# Patient Record
Sex: MALE | Race: BLACK OR AFRICAN AMERICAN | Employment: OTHER | URBAN - METROPOLITAN AREA
[De-identification: names, ages, dates, MRNs, and addresses within clinical notes are randomized per-mention and may not be internally consistent; named-entity substitution may affect disease eponyms.]

---

## 2017-01-10 ENCOUNTER — TELEPHONE (OUTPATIENT)
Dept: ENDOCRINOLOGY | Age: 57
End: 2017-01-10

## 2017-01-10 NOTE — TELEPHONE ENCOUNTER
Has upcoming appt 1/16/17 to discuss possible change to U500.  ----- Message from Perley Klinefelter sent at 1/9/2017  4:21 PM EST -----  Regarding: FW: Leeroy Vee / Brianna Gautam,  Please see attached message from [facility]. \"  Thanks Bharat Freeman.    ----- Message -----     From: Ondina Feng     Sent: 1/9/2017   3:45 PM       To: [address] [address] Office Pool  Subject: Leeroy Vee / Akbar Aguilar                                 [phone]    Pt's daughter Adriane Barry is requesting that a prescription for the \"novolog insulin\" 70-30 be sent to the [facility] on file.

## 2017-02-02 RX ORDER — SYRINGE AND NEEDLE,INSULIN,1ML 31GX15/64"
1 SYRINGE, EMPTY DISPOSABLE MISCELLANEOUS 2 TIMES DAILY
Qty: 200 PEN NEEDLE | Refills: 3 | Status: SHIPPED | OUTPATIENT
Start: 2017-02-02

## 2017-02-02 RX ORDER — SYRINGE AND NEEDLE,INSULIN,1ML 31GX15/64"
1 SYRINGE, EMPTY DISPOSABLE MISCELLANEOUS 2 TIMES DAILY WITH MEALS
Qty: 200 PEN NEEDLE | Refills: 3 | Status: SHIPPED | OUTPATIENT
Start: 2017-02-02 | End: 2017-02-02 | Stop reason: SDUPTHER

## 2017-02-02 NOTE — TELEPHONE ENCOUNTER
Patient is calling to request a refill on insulin syringes 6mm gauge 31. He uses [facility] DR JOANNA GÓMEZ in [address] Pharmacy phone: [phone]. Thank you.        Ramsey Mercedes

## 2017-02-20 RX ORDER — SYRINGE AND NEEDLE,INSULIN,1ML 30 G X1/2"
SYRINGE, EMPTY DISPOSABLE MISCELLANEOUS
Qty: 100 SYRINGE | Refills: 11 | Status: SHIPPED | OUTPATIENT
Start: 2017-02-20

## 2017-02-20 NOTE — TELEPHONE ENCOUNTER
----- Message from Sandi Meter sent at 2/20/2017  3:23 PM EST -----  Regarding: phone call  Patient's wife, Arcelia Christian, called to ask that you phone in a \"longer needle\" to her 's pharmacy. The needle he is using to inject his insulin is too short. Arcelia Christian is on his disclosure form and can be reached at:  [phone]. Thanks [employer].     Nehal   [phone]  Longer Insulin Needles

## 2017-03-27 ENCOUNTER — OFFICE VISIT (OUTPATIENT)
Dept: ENDOCRINOLOGY | Age: 57
End: 2017-03-27

## 2017-03-27 VITALS
DIASTOLIC BLOOD PRESSURE: 80 MMHG | HEART RATE: 78 BPM | HEIGHT: 74 IN | WEIGHT: 315 LBS | BODY MASS INDEX: 40.43 KG/M2 | SYSTOLIC BLOOD PRESSURE: 160 MMHG

## 2017-03-27 DIAGNOSIS — E11.42 TYPE 2 DIABETES MELLITUS WITH DIABETIC POLYNEUROPATHY, WITH LONG-TERM CURRENT USE OF INSULIN (HCC): Primary | ICD-10-CM

## 2017-03-27 DIAGNOSIS — E78.5 HYPERLIPIDEMIA WITH TARGET LDL LESS THAN 100: ICD-10-CM

## 2017-03-27 DIAGNOSIS — I10 ESSENTIAL HYPERTENSION: ICD-10-CM

## 2017-03-27 DIAGNOSIS — I65.23 BILATERAL CAROTID ARTERY STENOSIS: ICD-10-CM

## 2017-03-27 DIAGNOSIS — Z79.4 TYPE 2 DIABETES MELLITUS WITH DIABETIC POLYNEUROPATHY, WITH LONG-TERM CURRENT USE OF INSULIN (HCC): Primary | ICD-10-CM

## 2017-03-27 LAB — HBA1C MFR BLD HPLC: 8.6 %

## 2017-03-27 RX ORDER — CLOPIDOGREL BISULFATE 75 MG/1
75 TABLET ORAL DAILY
Qty: 90 TAB | Refills: 0 | Status: SHIPPED | OUTPATIENT
Start: 2017-03-27 | End: 2021-05-30

## 2017-03-27 RX ORDER — LOSARTAN POTASSIUM 100 MG/1
TABLET ORAL
Qty: 90 TAB | Refills: 3 | Status: ON HOLD | OUTPATIENT
Start: 2017-03-27 | End: 2021-05-29 | Stop reason: SDUPTHER

## 2017-03-27 NOTE — PROGRESS NOTES
Chief Complaint   Patient presents with    Diabetes     pcp and pharmacy confirmed   Records since last visit reviewed   History of Present Illness: Angie Harris is a 61 y.o. male here for follow up of diabetes. He was diagnosed with diabetes in the 90's.    At his last visit in November 2016 his A1C was 9.7% on Novolog 70/30: 85 units with breakfast and 85 units with dinner plus 2:50 for BG >150 and Metformin 1000mg BID. We discussed changing his insulin from the 70:30 to U-500 as he is requiring very high doses of insulin and his A1C was still above goal.  He wanted to hold off on making the change so we increased his dose to Novolog 100 units with breakfast and with dinner. Pt brought his BG logs with him today. His FBGs have been in the 150-250's range. He is pre-dinner BGs have been in the 150-250's range. He notes that \"I cheat on the weekends\". Pt is taking the Metformin 1000mg BID and Novolog 70:30  units with breakfast and with diner. There have been a couple of days he missed as shot because his wife did not fill his syringe. He notes that when his granddaughter is eating sweets he wants one as well so he is eating sweets on occasions. His wife notes that she has been much busier at work (EOL at school) and they have been eating more fast foods. The fast foods seem to make his BGs higher. His A1C was down to 8.6%. Pt notes he is still in PT and has been taught a new way to walk. He is going once per week, which is the most he can go because his wife, who is his transport, can not do it more often. Pt is eating 3 meals per day. He has breakfast around 7AM, this AM he had a bowl of oatmeal, a banana and coffee (stevia). He denies snacking between breakfast and lunch  He has lunch around 1230-130PM, yesterday he had a fish sandwich and some BBQ and and water  He will have a snack between lunch and dinner, of popcorn.  Typically around 5PM.   He has dinner around 7-8PM, last night he had chicken wings (steamed). He will have desert every night, he will have a [place] ice cream bar in the evening. He notes that when his granddaughter is around he is more likely to snack on chips. He notes he is still struggling with his sweet tooth. Exercise consists of very little. Pt has limited mobility from the CVA and his Charcot. Pt has a hx of CVA, Sz as well had Charcot foot in his left foot and hx of foot ulcers, pt is being followed by a podiatrist Dr. Streeter Said for his foot issues. He saw her last week. She told him that his foot ulcers are looking better. The ulcers are healing. They are talking about reconstruction of his foot in the near future, but wants to get his A1C in the 8 or less range. She also trimmed his toenails. No history of retinopathy, but does have neuropathy and nephropathy (Urine MA/Cr positive in August 2015). Last eye exam was December 2015 no retinopathy. A significant portion of the history was provided by his wife who accompanied him to his visit today. Current Outpatient Prescriptions   Medication Sig    losartan (COZAAR) 100 mg tablet TAKE ONE TABLET BY MOUTH ONCE DAILY    clopidogrel (PLAVIX) 75 mg tab Take 1 Tab by mouth daily.  BD INSULIN SYRINGE 1 mL 28 gauge x 1/2\" syrg Use two times per day with insulin  DX: E11.42 (patient wanted longer needle)    insulin syringe-needle U-100 1 mL 31 gauge x 15/64\" syrg 1 Syringe by Does Not Apply route two (2) times a day. Use to inject insulin two times per day. DX: E11.42    amLODIPine (NORVASC) 10 mg tablet Take 1 Tab by mouth daily.  insulin regular hum U-500 conc 500 unit/mL (3 mL) inpn 65 units TID    glucose blood VI test strips (ACCU-CHEK SMARTVIEW TEST STRIP) strip Test 2-3 times daily    insulin NPH/insulin regular (NOVOLIN 70/30, HUMULIN 70/30) 100 unit/mL (70-30) injection Inject  units in AM and PM with meals.  Dispense novolin or humulin brand or relion brand whichever is cheaper  atorvastatin (LIPITOR) 10 mg tablet Take 1 Tab by mouth nightly.  metFORMIN (GLUCOPHAGE) 1,000 mg tablet Take 1 Tab by mouth two (2) times daily (with meals). Indications: TYPE 2 DIABETES MELLITUS    aspirin delayed-release 81 mg tablet Take 1 Tab by mouth daily.  Multivit,Ca,Iron-FA-Lyco-Lut (CENTRAL-DUY) -913-250 mg-mcg-mcg-mcg tab Take  by mouth.  Cholecalciferol, Vitamin D3, (VITAMIN D3) 1,000 unit cap Take  by mouth.  Hydrochlorothiazide 12.5 mg tablet Take 12.5 mg by mouth daily.  metoprolol (LOPRESSOR) 50 mg tablet Take 50 mg by mouth two (2) times a day. No current facility-administered medications for this visit. Allergies   Allergen Reactions    Lisinopril Cough     Review of Systems:  - Eyes: no blurry vision or double vision  - Cardiovascular: no chest pain  - Respiratory: no shortness of breath  - Musculoskeletal: no myalgias  - Neurological: + numbness/tingling in feet    Physical Examination:  Blood pressure 160/80, pulse 78, height 6' 2\" (1.88 m), weight (!) 359 lb 4.8 oz (163 kg). - General: pleasant, no distress, good eye contact   - Neck: no carotid bruits  - Cardiovascular: regular, normal rate, nl s1 and s2, no m/r/g, 2+ DP pulses   - Respiratory: clear bilaterally        -     Foot exam deferred since he just saw his podiatrist last week. Images from podiatrist reviewed. - Psychiatric: normal mood and affect    Data Reviewed:   A1C today was 8.6%    Assessment/Plan:   1) DM > Pt's A1C has decreased to 8.6%. Pt instructed to take the Novolog 70:30 100 units BID plus correction. 2) HTN > BP was above goal today, pt is on an ARB    3) HLD > His lipid panel in June 2016 was excellent. Pt is tolerating his Atorvastatin well. Pt voices understanding and agreement with the plan. Pain noted and pt was recommended to call his PCP for further evaluation and treatment, as needed    Follow-up Disposition:  Return in about 3 months (around 6/27/2017).     Copy sent to:  Dr. Krystian Ca

## 2017-03-27 NOTE — LETTER
NOTIFICATION RETURN TO WORK / SCHOOL 
 
3/27/2017 12:31 PM 
 
Mr. Shyam [address]el[phone] To Whom It May Concern: 
 
Shyam Simpson is currently under the care of [address] [place] [address]. His wife needs to bring him to his appointments. She will return to work/school on: 3/28/17. If there are questions or concerns please have the patient contact our office. Sincerely, Franchesca Winters MD

## 2017-03-27 NOTE — PATIENT INSTRUCTIONS
1) Take the Novolog 70:30. 100 units with breakfast and 100 units with dinner. Send me some blood sugar readings in 3 weeks.

## 2017-03-27 NOTE — MR AVS SNAPSHOT
Visit Information Date & Time Provider Department Dept. Phone Encounter #  
 3/27/2017 12:10 PM Harpal Willis, [address] [place] [address] Diabetes and Endocrinology [phone] [id number] Follow-up Instructions Return in about 3 months (around 6/27/2017). Upcoming Health Maintenance Date Due Hepatitis C Screening 1960 Pneumococcal 19-64 Medium Risk (1 of 1 - PPSV23) 4/29/1978 DTaP/Tdap/Td series (1 - Tdap) 4/29/1980 FOBT Q 1 YEAR AGE 50-75 4/29/2009 INFLUENZA AGE 9 TO ADULT 8/1/2016 MICROALBUMIN Q1 8/27/2016 EYE EXAM RETINAL OR DILATED Q1 12/8/2016 HEMOGLOBIN A1C Q6M 4/18/2017 LIPID PANEL Q1 10/18/2017 FOOT EXAM Q1 11/28/2017 Allergies as of 3/27/2017  Review Complete On: 3/27/2017 By: Harpal Willis MD  
  
 Severity Noted Reaction Type Reactions Lisinopril  11/28/2016    Cough Current Immunizations  Never Reviewed No immunizations on file. Not reviewed this visit You Were Diagnosed With   
  
 Codes Comments Type 2 diabetes mellitus with diabetic polyneuropathy, with long-term current use of insulin (HCC)    -  Primary ICD-10-CM: E11.42, Z79.4 ICD-9-CM: 250.60, 357.2, V58.67 Essential hypertension     ICD-10-CM: I10 
ICD-9-CM: 401.9 Hyperlipidemia with target LDL less than 100     ICD-10-CM: E78.5 ICD-9-CM: 272.4 Bilateral carotid artery stenosis     ICD-10-CM: I65.23 ICD-9-CM: 433.10, 433.30 Vitals BP Pulse Height(growth percentile) Weight(growth percentile) BMI Smoking Status 160/80 (BP 1 Location: Left arm, BP Patient Position: Sitting) 78 6' 2\" (1.88 m) (!) 359 lb 4.8 oz (163 kg) 46.13 kg/m2 Current Some Day Smoker Vitals History BMI and BSA Data Body Mass Index Body Surface Area  
 46.13 kg/m 2 2.92 m 2 Preferred Pharmacy Pharmacy Name Phone [facility] [address] [facility] [address] [facility] [phone] Your Updated Medication List  
  
   
 This list is accurate as of: 3/27/17 12:33 PM.  Always use your most recent med list. amLODIPine 10 mg tablet Commonly known as:  Aimee Pace Take 1 Tab by mouth daily. aspirin delayed-release 81 mg tablet Take 1 Tab by mouth daily. atorvastatin 10 mg tablet Commonly known as:  LIPITOR Take 1 Tab by mouth nightly. CENTRAL-DUY -170-250 mg-mcg-mcg-mcg Tab Generic drug:  Multivit,Ca,Iron-FA-Lyco-Lut Take  by mouth. clopidogrel 75 mg Tab Commonly known as:  PLAVIX Take 1 Tab by mouth daily. glucose blood VI test strips strip Commonly known as:  [address] Test 2-3 times daily  
  
 hydroCHLOROthiazide 12.5 mg tablet Commonly known as:  HYDRODIURIL Take 12.5 mg by mouth daily. insulin CONCENTRATED regular 500 unit/mL (3 mL) Inpn subQ pen Commonly known as:  U-500  
65 units TID  
  
 insulin NPH/insulin regular 100 unit/mL (70-30) injection Commonly known as:  NOVOLIN 70/30, HUMULIN 70/30 Inject  units in AM and PM with meals. Dispense novolin or humulin brand or relion brand whichever is cheaper * insulin syringe-needle U-100 1 mL 31 gauge x 15/64\" Syrg 1 Syringe by Does Not Apply route two (2) times a day. Use to inject insulin two times per day. DX: E11.42  
  
 * BD INSULIN SYRINGE 1 mL 28 gauge x 1/2\" Syrg Generic drug:  Insulin Syringe-Needle U-100 Use two times per day with insulin  DX: E11.42 (patient wanted longer needle)  
  
 losartan 100 mg tablet Commonly known as:  COZAAR  
TAKE ONE TABLET BY MOUTH ONCE DAILY  
  
 metFORMIN 1,000 mg tablet Commonly known as:  GLUCOPHAGE Take 1 Tab by mouth two (2) times daily (with meals). Indications: TYPE 2 DIABETES MELLITUS  
  
 metoprolol tartrate 50 mg tablet Commonly known as:  LOPRESSOR Take 50 mg by mouth two (2) times a day. VITAMIN D3 1,000 unit Cap Generic drug:  cholecalciferol Take  by mouth. * Notice: This list has 2 medication(s) that are the same as other medications prescribed for you. Read the directions carefully, and ask your doctor or other care provider to review them with you. Prescriptions Sent to Pharmacy Refills  
 losartan (COZAAR) 100 mg tablet 3 Sig: TAKE ONE TABLET BY MOUTH ONCE DAILY Class: Normal  
 Pharmacy: [facility] [address] [facility], [address] Ph #: [phone] clopidogrel (PLAVIX) 75 mg tab 0 Sig: Take 1 Tab by mouth daily. Class: Normal  
 Pharmacy: [facility] [address] [facility], [address] Ph #: [phone] Route: Oral  
  
We Performed the Following AMB POC HEMOGLOBIN A1C [49276 CPT(R)] MICROALBUMIN, UR, RAND W/ MICROALBUMIN/CREA RATIO [id number] CPT(R)] [place] HANDLG&/OR CONVEY OF SPEC FOR TR OFFICE TO LAB [18980 CPT(R)] Follow-up Instructions Return in about 3 months (around 6/27/2017). Patient Instructions 1) Take the Novolog 70:30. 100 units with breakfast and 100 units with dinner. Send me some blood sugar readings in 3 weeks. Introducing [facility]! [employer] introduces [employer] patient portal. Now you can access parts of your medical record, email your doctor's office, and request medication refills online. 1. In your internet browser, go to [email or web address] 2. Click on the First Time User? Click Here link in the Sign In box. You will see the New Member Sign Up page. 3. Enter your ID AMERICA Access Code exactly as it appears below. You will not need to use this code after youve completed the sign-up process. If you do not sign up before the expiration date, you must request a new code. · ID AMERICA Access Code: [email or web address] Expires: 6/25/2017 11:58 AM 
 
4. Enter the last four digits of your Social Security Number (xxxx) and Date of Birth (mm/dd/yyyy) as indicated and click Submit.  You will be taken to the next sign-up page. 5. Create a [employer] ID. This will be your [employer] login ID and cannot be changed, so think of one that is secure and easy to remember. 6. Create a [employer] password. You can change your password at any time. 7. Enter your Password Reset Question and Answer. This can be used at a later time if you forget your password. 8. Enter your e-mail address. You will receive e-mail notification when new information is available in [address] 9. Click Sign Up. You can now view and download portions of your medical record. 10. Click the Download Summary menu link to download a portable copy of your medical information. If you have questions, please visit the Frequently Asked Questions section of the [employer] website. Remember, [employer] is NOT to be used for urgent needs. For medical emergencies, dial 911. Now available from your iPhone and Android! Please provide this summary of care documentation to your next provider. Your primary care clinician is listed as Lina Bassett. If you have any questions after today's visit, please call [phone].

## 2017-06-27 ENCOUNTER — TELEPHONE (OUTPATIENT)
Dept: NEUROLOGY | Age: 57
End: 2017-06-27

## 2017-06-27 NOTE — TELEPHONE ENCOUNTER
Bonita Abdi, he needs a carotid Doppler the day we see him in follow-up, can you make sure we schedule him for one the same day we we see him, and notify wife what we have done

## 2017-06-27 NOTE — TELEPHONE ENCOUNTER
Patient's wife is calling to see if there are any tests the patient needs to complete before his next appointment in September.   Thank you,  Debra Hinson

## 2017-09-25 ENCOUNTER — TELEPHONE (OUTPATIENT)
Dept: NEUROLOGY | Age: 57
End: 2017-09-25

## 2017-09-25 ENCOUNTER — OFFICE VISIT (OUTPATIENT)
Dept: NEUROLOGY | Age: 57
End: 2017-09-25

## 2017-09-25 ENCOUNTER — OFFICE VISIT (OUTPATIENT)
Dept: ENDOCRINOLOGY | Age: 57
End: 2017-09-25

## 2017-09-25 VITALS
HEART RATE: 89 BPM | DIASTOLIC BLOOD PRESSURE: 74 MMHG | HEIGHT: 74 IN | SYSTOLIC BLOOD PRESSURE: 140 MMHG | OXYGEN SATURATION: 95 %

## 2017-09-25 VITALS
BODY MASS INDEX: 40.43 KG/M2 | HEART RATE: 80 BPM | WEIGHT: 315 LBS | SYSTOLIC BLOOD PRESSURE: 134 MMHG | DIASTOLIC BLOOD PRESSURE: 75 MMHG | HEIGHT: 74 IN

## 2017-09-25 DIAGNOSIS — F17.200 TOBACCO DEPENDENCE: ICD-10-CM

## 2017-09-25 DIAGNOSIS — G40.209 COMPLEX PARTIAL SEIZURES EVOLVING TO GENERALIZED TONIC-CLONIC SEIZURES (HCC): ICD-10-CM

## 2017-09-25 DIAGNOSIS — I65.23 BILATERAL CAROTID ARTERY STENOSIS: ICD-10-CM

## 2017-09-25 DIAGNOSIS — E11.42 CONTROLLED TYPE 2 DIABETES MELLITUS WITH DIABETIC POLYNEUROPATHY, WITH LONG-TERM CURRENT USE OF INSULIN (HCC): ICD-10-CM

## 2017-09-25 DIAGNOSIS — E11.42 TYPE 2 DIABETES MELLITUS WITH DIABETIC POLYNEUROPATHY, WITH LONG-TERM CURRENT USE OF INSULIN (HCC): Primary | ICD-10-CM

## 2017-09-25 DIAGNOSIS — I63.312 CEREBRAL INFARCTION DUE TO THROMBOSIS OF LEFT MIDDLE CEREBRAL ARTERY (HCC): Primary | ICD-10-CM

## 2017-09-25 DIAGNOSIS — Z79.4 TYPE 2 DIABETES MELLITUS WITH DIABETIC POLYNEUROPATHY, WITH LONG-TERM CURRENT USE OF INSULIN (HCC): Primary | ICD-10-CM

## 2017-09-25 DIAGNOSIS — E78.2 MIXED HYPERLIPIDEMIA: ICD-10-CM

## 2017-09-25 DIAGNOSIS — I10 ESSENTIAL HYPERTENSION: ICD-10-CM

## 2017-09-25 DIAGNOSIS — Z79.4 CONTROLLED TYPE 2 DIABETES MELLITUS WITH DIABETIC POLYNEUROPATHY, WITH LONG-TERM CURRENT USE OF INSULIN (HCC): ICD-10-CM

## 2017-09-25 RX ORDER — NICOTINE 7MG/24HR
1 PATCH, TRANSDERMAL 24 HOURS TRANSDERMAL EVERY 24 HOURS
Qty: 30 PATCH | Refills: 0 | Status: SHIPPED | OUTPATIENT
Start: 2017-09-25 | End: 2017-09-25

## 2017-09-25 NOTE — MR AVS SNAPSHOT
Visit Information Date & Time Provider Department Dept. Phone Encounter #  
 9/25/2017  4:10 PM Phil Silva, [address] [facility] Diabetes and Endocrinology [phone] [id number] Follow-up Instructions Return in about 3 months (around 12/25/2017). Your Appointments 4/6/2018  2:20 PM  
Follow Up with Dao Oates MD  
Neurology [facility] at [facility]) Appt Note: Follow up $0CP tdb 9/25/17  
 [facility], 
[address] [place] [address], [address] [phone]  
[address] [facility], [address] [place] [address], [address] [phone] Upcoming Health Maintenance Date Due Hepatitis C Screening 1960 Pneumococcal 19-64 Medium Risk (1 of 1 - PPSV23) 4/29/1978 DTaP/Tdap/Td series (1 - Tdap) 4/29/1980 FOBT Q 1 YEAR AGE 50-75 4/29/2009 MICROALBUMIN Q1 8/27/2016 EYE EXAM RETINAL OR DILATED Q1 12/8/2016 INFLUENZA AGE 9 TO ADULT 8/1/2017 HEMOGLOBIN A1C Q6M 9/27/2017 LIPID PANEL Q1 10/18/2017 FOOT EXAM Q1 9/25/2018 Allergies as of 9/25/2017  Review Complete On: 9/25/2017 By: Phil Silva MD  
  
 Severity Noted Reaction Type Reactions Lisinopril  11/28/2016    Cough Current Immunizations  Never Reviewed No immunizations on file. Not reviewed this visit You Were Diagnosed With   
  
 Codes Comments Type 2 diabetes mellitus with diabetic polyneuropathy, with long-term current use of insulin (HCC)    -  Primary ICD-10-CM: E11.42, Z79.4 ICD-9-CM: 250.60, 357.2, V58.67 Mixed hyperlipidemia     ICD-10-CM: E78.2 ICD-9-CM: 272.2 Essential hypertension     ICD-10-CM: I10 
ICD-9-CM: 401.9 Vitals BP Pulse Height(growth percentile) Weight(growth percentile) BMI Smoking Status 134/75 80 6' 2\" (1.88 m) (!) 350 lb 3.2 oz (158.8 kg) 44.96 kg/m2 Current Some Day Smoker BMI and BSA Data Body Mass Index Body Surface Area  44.96 kg/m 2 2.88 m 2  
  
  
 Preferred Pharmacy Pharmacy Name Phone [facility], [place] - [address] [facility] [address] [place] [address] [phone] Your Updated Medication List  
  
   
This list is accurate as of: 9/25/17  4:19 PM.  Always use your most recent med list. amLODIPine 10 mg tablet Commonly known as:  Jenna Robert Take 1 Tab by mouth daily. aspirin delayed-release 81 mg tablet Take 1 Tab by mouth daily. atorvastatin 10 mg tablet Commonly known as:  LIPITOR Take 1 Tab by mouth nightly. CENTRAL-DUY -978-250 mg-mcg-mcg-mcg Tab Generic drug:  Multivit,Ca,Iron-FA-Lyco-Lut Take  by mouth. clopidogrel 75 mg Tab Commonly known as:  PLAVIX Take 1 Tab by mouth daily. glucose blood VI test strips strip Commonly known as:  [address] Test 2-3 times daily  
  
 hydroCHLOROthiazide 12.5 mg tablet Commonly known as:  HYDRODIURIL Take 12.5 mg by mouth daily. insulin NPH/insulin regular 100 unit/mL (70-30) injection Commonly known as:  NOVOLIN 70/30, HUMULIN 70/30 Inject  units in AM and PM with meals. Dispense novolin or humulin brand or relion brand whichever is cheaper * insulin syringe-needle U-100 1 mL 31 gauge x 15/64\" Syrg 1 Syringe by Does Not Apply route two (2) times a day. Use to inject insulin two times per day. DX: E11.42  
  
 * BD INSULIN SYRINGE 1 mL 28 gauge x 1/2\" Syrg Generic drug:  Insulin Syringe-Needle U-100 Use two times per day with insulin  DX: E11.42 (patient wanted longer needle)  
  
 losartan 100 mg tablet Commonly known as:  COZAAR  
TAKE ONE TABLET BY MOUTH ONCE DAILY  
  
 metFORMIN 1,000 mg tablet Commonly known as:  GLUCOPHAGE Take 1 Tab by mouth two (2) times daily (with meals). Indications: TYPE 2 DIABETES MELLITUS  
  
 metoprolol tartrate 50 mg tablet Commonly known as:  LOPRESSOR Take 50 mg by mouth two (2) times a day. VITAMIN D3 1,000 unit Cap Generic drug:  cholecalciferol Take  by mouth. * Notice: This list has 2 medication(s) that are the same as other medications prescribed for you. Read the directions carefully, and ask your doctor or other care provider to review them with you. We Performed the Following  DIABETES FOOT EXAM [7 Custom] MICROALBUMIN, UR, RAND W/ MICROALBUMIN/CREA RATIO [id number] CPT(R)] Follow-up Instructions Return in about 3 months (around 12/25/2017). Patient Instructions Keep a record of your blood sugars and how much insulin you are taking and mail that log to me in 2 weeks. Introducing [facility]! [facility] introduces Early[employer]acks patient portal. Now you can access parts of your medical record, email your doctor's office, and request medication refills online. 1. In your internet browser, go to [email or web address] 2. Click on the First Time User? Click Here link in the Sign In box. You will see the New Member Sign Up page. 3. Enter your EarlyTracks Access Code exactly as it appears below. You will not need to use this code after youve completed the sign-up process. If you do not sign up before the expiration date, you must request a new code. · EarlyTracks Access Code: [email or web address] Expires: 12/24/2017  2:16 PM 
 
4. Enter the last four digits of your Social Security Number (xxxx) and Date of Birth (mm/dd/yyyy) as indicated and click Submit. You will be taken to the next sign-up page. 5. Create a Zhaogangt ID. This will be your EarlyTracks login ID and cannot be changed, so think of one that is secure and easy to remember. 6. Create a EarlyTracks password. You can change your password at any time. 7. Enter your Password Reset Question and Answer. This can be used at a later time if you forget your password. 8. Enter your e-mail address. You will receive e-mail notification when new information is available in [address] 9. Click Sign Up. You can now view and download portions of your medical record. 10. Click the Download Summary menu link to download a portable copy of your medical information. If you have questions, please visit the Frequently Asked Questions section of the [employer] website. Remember, [employer] is NOT to be used for urgent needs. For medical emergencies, dial 911. Now available from your iPhone and Android! Please provide this summary of care documentation to your next provider. Your primary care clinician is listed as Julia Doing. If you have any questions after today's visit, please call [phone].

## 2017-09-25 NOTE — PROGRESS NOTES
Chief Complaint   Patient presents with    Diabetes     pcp and pharmacy verified. Last eye exam over a year. Records since last visit reviewed   History of Present Illness: Ede Ruiz is a 61 y.o. male here for follow up of diabetes. He was diagnosed with diabetes in the 90's.    At his last visit in March 2017 his A1C was down from 9.7% to 8.6% on Novolog 70/30 85 units with breakfast and dinner, plus 2:50 correction for BG >150, and Metformin 1000mg BID. I instructed him to increase the Novolog 70:30 to 100 units BID plus 2:50 correction for BG >150 and continue the Metformin 1000mg BID. He saw his neurologist today to follow up on hx of CVA and Sz. He has carotid dopplers pending. Pt notes his PCP just faisal an A1C a couple weeks ago, will call and request this result. He has lost 10 pounds since March 2017. Pt notes he cut out beer and has cut back on his sweets. He notes his average BG for the last 3 months was 166. He brought his glucometer with him today. Pt is taking the Novolog 70/30,  units twice per day. He notes sometimes he has been having some lower BGs. He notes he has had BGs as low at the 80's. He notes he has had some BGs in the 200's when he eats pasta or desserts. He is still working with PT and he notes he is walking more. Today he is ambulating with a cane. For the last month, after his last A1C was still in the 8's range he made more changes with the goal of getting into the 7's. Pt is eating 3 meals per day. He has breakfast around 730AM, this AM he had a bowl of oatmeal (NSA) sausage and egg biscuit, a banana and coffee (stevia). He denies snacking between breakfast and lunch. He has lunch around 1230-130PM, yesterday he had a burger from [employer], no fries and water to drink. He denies mid-afternoon snacking. He has dinner around 7-8PM, last night he had stewed chicken, no side items and water.     He will have a "[employer] ice cream bar or something sweet in the evening. He notes he is still struggling with his sweet tooth. Exercise consists of very little. Pt has limited mobility from the CVA and his Charcot. Pt has a hx of CVA, Sz as well had Charcot foot in his left foot and hx of foot ulcers, pt is being followed by a podiatrist Dr. Jimmy Le for his foot issues. He saw her last week. She told him that his foot ulcers are looking better. The ulcers are healing. They are talking about reconstruction of his foot in the near future, but he needs to have his teeth addressed first and he wants to get his A1C in the 8 or less range. She also trimmed his toenails. No history of retinopathy, but does have neuropathy and nephropathy (Urine MA/Cr positive in August 2015). Last eye exam was December 2015 no retinopathy. A significant portion of the history was provided by his wife who accompanied him to his visit today. Current Outpatient Prescriptions   Medication Sig    losartan (COZAAR) 100 mg tablet TAKE ONE TABLET BY MOUTH ONCE DAILY    clopidogrel (PLAVIX) 75 mg tab Take 1 Tab by mouth daily.  BD INSULIN SYRINGE 1 mL 28 gauge x 1/2\" syrg Use two times per day with insulin  DX: E11.42 (patient wanted longer needle)    insulin syringe-needle U-100 1 mL 31 gauge x 15/64\" syrg 1 Syringe by Does Not Apply route two (2) times a day. Use to inject insulin two times per day. DX: E11.42    amLODIPine (NORVASC) 10 mg tablet Take 1 Tab by mouth daily.  glucose blood VI test strips (ACCU-CHEK SMARTVIEW TEST STRIP) strip Test 2-3 times daily    insulin NPH/insulin regular (NOVOLIN 70/30, HUMULIN 70/30) 100 unit/mL (70-30) injection Inject  units in AM and PM with meals. Dispense novolin or humulin brand or relion brand whichever is cheaper    atorvastatin (LIPITOR) 10 mg tablet Take 1 Tab by mouth nightly.  metFORMIN (GLUCOPHAGE) 1,000 mg tablet Take 1 Tab by mouth two (2) times daily (with meals).  Indications: TYPE 2 DIABETES MELLITUS    aspirin delayed-release 81 mg tablet Take 1 Tab by mouth daily.  Multivit,Ca,Iron-FA-Lyco-Lut (CENTRAL-DUY) -491-250 mg-mcg-mcg-mcg tab Take  by mouth.  Cholecalciferol, Vitamin D3, (VITAMIN D3) 1,000 unit cap Take  by mouth.  Hydrochlorothiazide 12.5 mg tablet Take 12.5 mg by mouth daily.  metoprolol (LOPRESSOR) 50 mg tablet Take 50 mg by mouth two (2) times a day. No current facility-administered medications for this visit. Allergies   Allergen Reactions    Lisinopril Cough     Review of Systems:  - Eyes: no blurry vision or double vision  - Cardiovascular: no chest pain  - Respiratory: no shortness of breath  - Musculoskeletal: no myalgias  - Neurological: + numbness/tingling in feet    Physical Examination:  Blood pressure 134/75, pulse 80, height 6' 2\" (1.88 m), weight (!) 350 lb 3.2 oz (158.8 kg). - General: pleasant, no distress, good eye contact   - Neck: no carotid bruits  - Cardiovascular: regular, normal rate, nl s1 and s2, no m/r/g, 2+ DP pulses   - Respiratory: clear bilaterally        -     Foot exam deferred since he just saw his podiatrist last week. (Images from podiatrist reviewed. )  - Psychiatric: normal mood and affect    Data Reviewed:   - will request recent labs from PCP    Assessment/Plan:   1) DM > He notes his A1C last month was 8.3% and he has been making dietary changes since that time. He has had some BGs in the 80's so I am hesitant to make any increases in her insulin at this time. Will have pt keep a log of his BGs for the next 2 weeks, with how much insulin he has given. Pt to check his BG AC/HS and mail his BG logs to me in 2 weeks. Will check his urine MA today. 2) HTN > BP was above goal today, pt is on an ARB    3) HLD > His lipid panel in June 2016 was excellent. Pt is tolerating his Atorvastatin well. Will request recent lipid panel from PCP. Pt voices understanding and agreement with the plan.   Pain noted and pt was recommended to call his PCP for further evaluation and treatment, as needed    Follow-up Disposition:  Return in about 3 months (around 12/25/2017).     Copy sent to:  Dr. Mart Penaloza

## 2017-09-25 NOTE — PROGRESS NOTES
Date:            2017    Name:  Genesis Bowie  :  1960  MRN:  [id number]     PCP:  Ml Yost MD    Chief Complaint   Patient presents with    Follow-up    Stroke         HISTORY OF PRESENT ILLNESS:  Zahida Barragan is a 61 y.o., male who presents today for follow up for seizures and stroke. He was last seen in 2015. He has residual right-sided weakness from his stroke, but he has recovered very well. He has been off seizure medication since , has not had any seizures since coming off. No staring spells per his wife. He is following closely with PCP for management of DM, A1c has come down from 12 to 8.6. He cannot walk or do much else for exercise due to his Charcot foot and stroke residual. He still has weakness in his right arm and leg from his stroke. This is much better, he is still in speech, PT and OT once a week. He is still on aspirin 325 mg. No major changes in his health, his WBC count is up and he has to get his teeth pulled because he has gum disease. He is smoking still, a pack every other week. That's about 2-3 a day. He used to smoke two packs a day, so he is pleased with his progress in cutting down. [phone] recap  Zahida Barragan is a 63 y.o. right-handed Afro-American male seen today for evaluation of seizures and stroke. Patient had sudden worsening of gait after therapy in May 2015, and was seen in our office for evaluation for possible stroke because he couldn't walk well again. His MRA suggested some extracranial disease the images are compromised and Dopplers are recommended. MRI showed prominent old infarcts and microvascular disease, but no clear new stroke. He has been given physical therapy with improvement in his gait, and he is getting back to his normal baseline. Patient has had no further seizures or stroke since his last visit in July. He is off Keppra now for 6 months and has had no seizures.  He had an EEG in December and that just showed mild generalized slowing, a little more prominent on the left side, but no spikes or seizures. He also wants to drive again. I told him not to drive but go to [facility] and retake his license pulled on the road and written exam, and if he could pass goes he could drive. Patient was admitted on May 28, 2014, with new expressive aphasia and right-sided weakness and some right facial twitching. He was diagnosed with a seizure not a stroke despite the fact that his speech and right hemiparesis persists. His EEG was normal and he was started on Keppra, and his aspirin increase to 325 mg a day from 81 mg a day. His carotid Dopplers were relatively unremarkable and his echocardiogram was supposedly normal. His recent MRI scan that he obtained after discharge because he was too large for the machine here, did show a new left frontal infarct, and some very mild other diffusion positive areas. He has no history of heart disease or atrial fibrillation. We told him to go off the aspirin and start Plavix as a better antiplatelet agent. He has done much better with therapy, and is walking with a cane now because of his charcoal joints in his feet and speaking better. He is now in outpatient therapy. His diabetes is better controlled and his blood pressure is much better. We advised him to control his diabetes, controlled his cholesterol, remain off cigarettes, and continue good blood pressure control and take his Plavix regularly, and remained mentally and physically active, and take a vitamin and vitamin D every day. He has had no other new neurologic symptoms as far as weakness numbness or vision problems or other strokelike symptoms. He was advised on how to recognize strokelike symptoms.     He has a past medical history stroke, diabetes, high blood pressure, hyperlipidemia, questionable seizure.  On complete review of systems and symptoms, he has had no other new medical problems, complications or illnesses recently. Current Outpatient Prescriptions   Medication Sig    losartan (COZAAR) 100 mg tablet TAKE ONE TABLET BY MOUTH ONCE DAILY    clopidogrel (PLAVIX) 75 mg tab Take 1 Tab by mouth daily.  BD INSULIN SYRINGE 1 mL 28 gauge x 1/2\" syrg Use two times per day with insulin  DX: E11.42 (patient wanted longer needle)    insulin syringe-needle U-100 1 mL 31 gauge x 15/64\" syrg 1 Syringe by Does Not Apply route two (2) times a day. Use to inject insulin two times per day. DX: E11.42    amLODIPine (NORVASC) 10 mg tablet Take 1 Tab by mouth daily.  insulin regular hum U-500 conc 500 unit/mL (3 mL) inpn 65 units TID    glucose blood VI test strips (ACCU-CHEK SMARTVIEW TEST STRIP) strip Test 2-3 times daily    insulin NPH/insulin regular (NOVOLIN 70/30, HUMULIN 70/30) 100 unit/mL (70-30) injection Inject  units in AM and PM with meals. Dispense novolin or humulin brand or relion brand whichever is cheaper    atorvastatin (LIPITOR) 10 mg tablet Take 1 Tab by mouth nightly.  metFORMIN (GLUCOPHAGE) 1,000 mg tablet Take 1 Tab by mouth two (2) times daily (with meals). Indications: TYPE 2 DIABETES MELLITUS    aspirin delayed-release 81 mg tablet Take 1 Tab by mouth daily.  Multivit,Ca,Iron-FA-Lyco-Lut (CENTRAL-DUY) -566-250 mg-mcg-mcg-mcg tab Take  by mouth.  Cholecalciferol, Vitamin D3, (VITAMIN D3) 1,000 unit cap Take  by mouth.  Hydrochlorothiazide 12.5 mg tablet Take 12.5 mg by mouth daily.  metoprolol (LOPRESSOR) 50 mg tablet Take 50 mg by mouth two (2) times a day. No current facility-administered medications for this visit. Allergies   Allergen Reactions    Lisinopril Cough     Past Medical History:   Diagnosis Date    Diabetes ([address]     Neurological disorder      No past surgical history on file.   Social History     Social History    Marital status:      Spouse name: N/A    Number of children: N/A    Years of education: N/A     Occupational History    Not on file. Social History Main Topics    Smoking status: Current Some Day Smoker     Packs/day: 0.25     Years: 30.00    Smokeless tobacco: Not on file    Alcohol use 0.5 oz/week     1 Cans of beer per week      Comment: 1 drink a week    Drug use: No    Sexual activity: Not on file     Other Topics Concern    Not on file     Social History Narrative     Family History   Problem Relation Age of Onset    Cancer Mother      breast    Diabetes Mother     Hypertension Mother    Henrry Parisian Elevated Lipids Mother     Hypertension Father     Diabetes Father     Heart Disease Father     Elevated Lipids Father     Cancer Father      unknown    Arthritis-osteo Child     No Known Problems Sister     No Known Problems Brother     No Known Problems Sister          PHYSICAL EXAMINATION:    Visit Vitals    /74    Pulse 89    Ht 6' 2\" (1.88 m)    SpO2 95%     General:  Well defined, morbidly obese, and groomed individual in no acute distress. Neck: Supple, nontender, no bruits, no pain with resistance to active range of motion. Heart: Regular rate and rhythm, no murmurs, rub, or gallop. Normal S1S2. Lungs:  Clear to auscultation bilaterally with equal chest expansion, no cough, no wheeze  Musculoskeletal:  Extremities revealed no edema and had full range of motion of joints. Psych:  Good mood and bright affect    NEUROLOGICAL EXAMINATION:     Mental Status:   Alert and oriented to person, place, and time with recent and remote memory intact. Attention span and concentration are normal. Speech is fluent with a full fund of knowledge. Cranial Nerves:    II, III, IV, VI:  Visual acuity grossly intact. Extra-ocular movements are full and fluid. No ptosis or nystagmus. V-XII: Hearing is grossly intact. Facial features are symmetric, with normal sensation and strength. The palate rises symmetrically and the tongue protrudes midline. Sternocleidomastoids 5/5.       Motor Examination: 5/5 left upper and lower extremity, 4/5 distal right upper extremity, 4+ right lower extremity/5  Coordination:  Finger to nose testing was normal.   No resting or intention tremor  Gait and Station:  Steady while walking. Normal arm swing. No pronator drift. No muscle wasting or fasciculations noted. ASSESSMENT AND PLAN    ICD-10-CM ICD-9-CM    1. Cerebral infarction due to thrombosis of left middle cerebral artery ([facility]) I63.312 434.01    2. Tobacco dependence F17.200 305.1 nicotine (NICODERM [facility]Q) 7 mg/24 hr   3. Controlled type 2 diabetes mellitus with diabetic polyneuropathy, with long-term current use of insulin ([facility]) E11.42 250.60     Z79.4 357.2      V58.67    4. Complex partial seizures evolving to generalized tonic-clonic seizures ([facility] G40.209 345.40    5. Bilateral carotid artery stenosis I65.23 433.10 DUPLEX CAROTID BILATERAL AMB NEURO     433.30      71-year-old male seen in follow-up for stroke a few years ago. He has a mild right-sided residual, but is still working with physical therapy, occupational therapy, and speech therapy regularly. He and his wife are very happy with his progress. DM has improved, last A1c was 8.6. Blood pressure controlled today. He is working on quitting smoking, is down to 2 cigarettes a day. 1.  Discussed the importance of continuing to follow with PCP for monitoring of a pressure, blood sugar, cholesterol  2. Discussed that A1c goal is less than 7 for stroke prevention  3. Continue aspirin 325 mg  4. Repeat carotid Doppler, patient plans to do this at [address] [place] [address] closer to his home  5. Discussed the importance of smoking cessation, sent in prescription for nicotine patches 7 mg to help achieve that goal    Follow-up in 6 months, call sooner with concern      Deuce Newton NP    This note was created using voice recognition software. Despite editing, there may be syntax errors.

## 2017-09-25 NOTE — TELEPHONE ENCOUNTER
Pt's wife came back to the office, she said that her  didn't get the order to have the scan done of his neck, and she also needs a letter saying that she brought him to his appt today (9/25)    [phone]

## 2017-09-25 NOTE — MR AVS SNAPSHOT
Visit Information Date & Time Provider Department Dept. Phone Encounter #  
 9/25/2017  2:30 PM Ramya Goetz NP [facility] [phone] [id number] Your Appointments 9/25/2017  4:10 PM  
Follow Up with Betty Fish MD  
[facility] [address] [facility]) Appt Note: 3 month f/u diabetes; 3 month f/u diabetes r/s from 6/29/17  
 [address] [place] [address] [address] [phone] [address] [facility] [phone]  
  
    
 4/6/2018  2:20 PM  
Follow Up with Vidya Hinojosa MD  
[facility] [address] [place] [address] Road) Appt Note: Follow up $0CP tdb 9/25/17  
 [address] [place] [address], 
[address] [place] [address], [address] [phone]  
[address] [facility], [address] [place] [address], [address] [facility] [address] [phone] Upcoming Health Maintenance Date Due Hepatitis C Screening 1960 Pneumococcal 19-64 Medium Risk (1 of 1 - PPSV23) 4/29/1978 DTaP/Tdap/Td series (1 - Tdap) 4/29/1980 FOBT Q 1 YEAR AGE 50-75 4/29/2009 MICROALBUMIN Q1 8/27/2016 EYE EXAM RETINAL OR DILATED Q1 12/8/2016 INFLUENZA AGE 9 TO ADULT 8/1/2017 HEMOGLOBIN A1C Q6M 9/27/2017 LIPID PANEL Q1 10/18/2017 FOOT EXAM Q1 11/28/2017 Allergies as of 9/25/2017  Review Complete On: 9/25/2017 By: Johnathan Anderson LPN Severity Noted Reaction Type Reactions Lisinopril  11/28/2016    Cough Current Immunizations  Never Reviewed No immunizations on file. Not reviewed this visit You Were Diagnosed With   
  
 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)    -  Primary ICD-10-CM: P39.388 ICD-9-CM: 434.01 Tobacco dependence     ICD-10-CM: U28.290 ICD-9-CM: 305.1  Controlled type 2 diabetes mellitus with diabetic polyneuropathy, with long-term current use of insulin (HCC)     ICD-10-CM: E11.42, Z79.4 ICD-9-CM: 250.60, 357.2, V58.67 Complex partial seizures evolving to generalized tonic-clonic seizures ([facility] [address]     ICD-10-CM: E23.469 ICD-9-CM: 345.40 Vitals BP Pulse Height(growth percentile) SpO2 Smoking Status 140/74 89 6' 2\" (1.88 m) 95% Current Some Day Smoker Preferred Pharmacy Pharmacy Name Phone [facility] [address] [facility], [address] [facility] [phone] Your Updated Medication List  
  
   
This list is accurate as of: 9/25/17  2:58 PM.  Always use your most recent med list. amLODIPine 10 mg tablet Commonly known as:  Ilene Grebe Take 1 Tab by mouth daily. aspirin delayed-release 81 mg tablet Take 1 Tab by mouth daily. atorvastatin 10 mg tablet Commonly known as:  LIPITOR Take 1 Tab by mouth nightly. CENTRAL-DUY -913-250 mg-mcg-mcg-mcg Tab Generic drug:  Multivit,Ca,Iron-FA-Lyco-Lut Take  by mouth. clopidogrel 75 mg Tab Commonly known as:  PLAVIX Take 1 Tab by mouth daily. glucose blood VI test strips strip Commonly known as:  [address] Test 2-3 times daily  
  
 hydroCHLOROthiazide 12.5 mg tablet Commonly known as:  HYDRODIURIL Take 12.5 mg by mouth daily. insulin NPH/insulin regular 100 unit/mL (70-30) injection Commonly known as:  NOVOLIN 70/30, HUMULIN 70/30 Inject  units in AM and PM with meals. Dispense novolin or humulin brand or relion brand whichever is cheaper * insulin syringe-needle U-100 1 mL 31 gauge x 15/64\" Syrg 1 Syringe by Does Not Apply route two (2) times a day. Use to inject insulin two times per day. DX: E11.42  
  
 * BD INSULIN SYRINGE 1 mL 28 gauge x 1/2\" Syrg Generic drug:  Insulin Syringe-Needle U-100 Use two times per day with insulin  DX: E11.42 (patient wanted longer needle) insulin U-500 CONCENTRATED regular 500 unit/mL (3 mL) Inpn subQ pen Commonly known as:  U-500  
65 units TID  
  
 losartan 100 mg tablet Commonly known as:  COZAAR  
TAKE ONE TABLET BY MOUTH ONCE DAILY  
  
 metFORMIN 1,000 mg tablet Commonly known as:  GLUCOPHAGE Take 1 Tab by mouth two (2) times daily (with meals). Indications: TYPE 2 DIABETES MELLITUS  
  
 metoprolol tartrate 50 mg tablet Commonly known as:  LOPRESSOR Take 50 mg by mouth two (2) times a day. nicotine 7 mg/24 hr  
Commonly known as:  NICODERM CQ  
1 Patch by TransDERmal route every twenty-four (24) hours for 30 days. VITAMIN D3 1,000 unit Cap Generic drug:  cholecalciferol Take  by mouth. * Notice: This list has 2 medication(s) that are the same as other medications prescribed for you. Read the directions carefully, and ask your doctor or other care provider to review them with you. Prescriptions Sent to Pharmacy Refills  
 nicotine (NICODERM CQ) 7 mg/24 hr 0 Si Patch by TransDERmal route every twenty-four (24) hours for 30 days. Class: Normal  
 Pharmacy: [address], [facility] [address] [facility] Ph #: [phone] Route: TransDERmal  
  
Patient Instructions PRESCRIPTION REFILL POLICY [facility] Statement to Patients 2014 In an effort to ensure the large volume of patient prescription refills is processed in the most efficient and expeditious manner, we are asking our patients to assist us by calling your Pharmacy for all prescription refills, this will include also your  Mail Order Pharmacy. The pharmacy will contact our office electronically to continue the refill process. Please do not wait until the last minute to call your pharmacy. We need at least 48 hours (2days) to fill prescriptions. We also encourage you to call your pharmacy before going to  your prescription to make sure it is ready. With regard to controlled substance prescription refill requests (narcotic refills) that need to be picked up at our office, we ask your cooperation by providing us with at least 72 hours (3days) notice that you will need a refill. We will not refill narcotic prescription refill requests after 4:00pm on any weekday, Monday through Thursday, or after 2:00pm on Fridays, or on the weekends. We encourage everyone to explore another way of getting your prescription refill request processed using Darkstrand, our patient web portal through our electronic medical record system. Darkstrand is an efficient and effective way to communicate your medication request directly to the office and  downloadable as an nancy on your smart phone . Darkstrand also features a review functionality that allows you to view your medication list as well as leave messages for your physician. Are you ready to get connected? If so please review the attatched instructions or speak to any of our staff to get you set up right away! Thank you so much for your cooperation. Should you have any questions please contact our Practice Administrator. The Physicians and Staff,  [facility] A Healthy Lifestyle: Care Instructions Your Care Instructions A healthy lifestyle can help you feel good, stay at a healthy weight, and have plenty of energy for both work and play. A healthy lifestyle is something you can share with your whole family. A healthy lifestyle also can lower your risk for serious health problems, such as high blood pressure, heart disease, and diabetes. You can follow a few steps listed below to improve your health and the health of your family. Follow-up care is a key part of your treatment and safety. Be sure to make and go to all appointments, and call your doctor if you are having problems. Its also a good idea to know your test results and keep a list of the medicines you take. How can you care for yourself at home? · Do not eat too much sugar, fat, or fast foods. You can still have dessert and treats now and then. The goal is moderation. · Start small to improve your eating habits. Pay attention to portion sizes, drink less juice and soda pop, and eat more fruits and vegetables. ¨ Eat a healthy amount of food. A 3-ounce serving of meat, for example, is about the size of a deck of cards. Fill the rest of your plate with vegetables and whole grains. ¨ Limit the amount of soda and sports drinks you have every day. Drink more water when you are thirsty. ¨ Eat at least 5 servings of fruits and vegetables every day. It may seem like a lot, but it is not hard to reach this goal. A serving or helping is 1 piece of fruit, 1 cup of vegetables, or 2 cups of leafy, raw vegetables. Have an apple or some carrot sticks as an afternoon snack instead of a candy bar. Try to have fruits and/or vegetables at every meal. 
· Make exercise part of your daily routine. You may want to start with simple activities, such as walking, bicycling, or slow swimming. Try to be active 30 to 60 minutes every day. You do not need to do all 30 to 60 minutes all at once. For example, you can exercise 3 times a day for 10 or 20 minutes. Moderate exercise is safe for most people, but it is always a good idea to talk to your doctor before starting an exercise program. 
· Keep moving. Ehsan Camera the lawn, work in the garden, or [place]. Take the stairs instead of the elevator at work. · If you smoke, quit. People who smoke have an increased risk for heart attack, stroke, cancer, and other lung illnesses. Quitting is hard, but there are ways to boost your chance of quitting tobacco for good. ¨ Use nicotine gum, patches, or lozenges. ¨ Ask your doctor about stop-smoking programs and medicines. ¨ Keep trying.  
In addition to reducing your risk of diseases in the future, you will notice some benefits soon after you stop using tobacco. If you have shortness of breath or asthma symptoms, they will likely get better within a few weeks after you quit. · Limit how much alcohol you drink. Moderate amounts of alcohol (up to 2 drinks a day for men, 1 drink a day for women) are okay. But drinking too much can lead to liver problems, high blood pressure, and other health problems. Family health If you have a family, there are many things you can do together to improve your health. · Eat meals together as a family as often as possible. · Eat healthy foods. This includes fruits, vegetables, lean meats and dairy, and whole grains. · Include your family in your fitness plan. Most people think of activities such as jogging or tennis as the way to fitness, but there are many ways you and your family can be more active. Anything that makes you breathe hard and gets your heart pumping is exercise. Here are some tips: 
¨ Walk to do errands or to take your child to school or the bus. ¨ Go for a family bike ride after dinner instead of watching TV. Where can you learn more? Go to http://julitoCompuMedtiffanie.info/. Enter V517 in the search box to learn more about \"A Healthy Lifestyle: Care Instructions. \" Current as of: July 26, 2016 Content Version: 11.3 © 7085-6695 [employer]. Care instructions adapted under license by [employer] (which disclaims liability or warranty for this information). If you have questions about a medical condition or this instruction, always ask your healthcare professional. [place] [address] any warranty or liability for your use of this information. Introducing hospitals & HEALTH SERVICES! [facility] introduces Fresh Direct patient portal. Now you can access parts of your medical record, email your doctor's office, and request medication refills online.    
 
1. In your internet browser, go to [email or web address] 2. Click on the First Time User? Click Here link in the Sign In box. You will see the New Member Sign Up page. 3. Enter your MusicAll Access Code exactly as it appears below. You will not need to use this code after youve completed the sign-up process. If you do not sign up before the expiration date, you must request a new code. · MusicAll Access Code: [email or web address] Expires: 12/24/2017  2:16 PM 
 
4. Enter the last four digits of your Social Security Number (xxxx) and Date of Birth (mm/dd/yyyy) as indicated and click Submit. You will be taken to the next sign-up page. 5. Create a [place] [employer] [place] ID. This will be your MusicAll login ID and cannot be changed, so think of one that is secure and easy to remember. 6. Create a MusicAll password. You can change your password at any time. 7. Enter your Password Reset Question and Answer. This can be used at a later time if you forget your password. 8. Enter your e-mail address. You will receive e-mail notification when new information is available in [address] 9. Click Sign Up. You can now view and download portions of your medical record. 10. Click the Download Summary menu link to download a portable copy of your medical information. If you have questions, please visit the Frequently Asked Questions section of the MusicAll website. Remember, MusicAll is NOT to be used for urgent needs. For medical emergencies, dial 911. Now available from your iPhone and Android! Please provide this summary of care documentation to your next provider. Your primary care clinician is listed as Shade Fraction. If you have any questions after today's visit, please call [phone].

## 2017-09-25 NOTE — PATIENT INSTRUCTIONS
[facility]   Statement to Patients  April 1, 2014      In an effort to ensure the large volume of patient prescription refills is processed in the most efficient and expeditious manner, we are asking our patients to assist us by calling your Pharmacy for all prescription refills, this will include also your  Mail Order Pharmacy. The pharmacy will contact our office electronically to continue the refill process. Please do not wait until the last minute to call your pharmacy. We need at least 48 hours (2days) to fill prescriptions. We also encourage you to call your pharmacy before going to  your prescription to make sure it is ready. With regard to controlled substance prescription refill requests (narcotic refills) that need to be picked up at our office, we ask your cooperation by providing us with at least 72 hours (3days) notice that you will need a refill. We will not refill narcotic prescription refill requests after 4:00pm on any weekday, Monday through Thursday, or after 2:00pm on Fridays, or on the weekends. We encourage everyone to explore another way of getting your prescription refill request processed using Wikibon, our patient web portal through our electronic medical record system. Wikibon is an efficient and effective way to communicate your medication request directly to the office and  downloadable as an nancy on your smart phone . Wikibon also features a review functionality that allows you to view your medication list as well as leave messages for your physician. Are you ready to get connected? If so please review the attatched instructions or speak to any of our staff to get you set up right away! Thank you so much for your cooperation. Should you have any questions please contact our Practice Administrator.     The Physicians and Staff,  [facility]          A Healthy Lifestyle: Care Instructions  Your Care Instructions  A healthy lifestyle can help you feel good, stay at a healthy weight, and have plenty of energy for both work and play. A healthy lifestyle is something you can share with your whole family. A healthy lifestyle also can lower your risk for serious health problems, such as high blood pressure, heart disease, and diabetes. You can follow a few steps listed below to improve your health and the health of your family. Follow-up care is a key part of your treatment and safety. Be sure to make and go to all appointments, and call your doctor if you are having problems. Its also a good idea to know your test results and keep a list of the medicines you take. How can you care for yourself at home? · Do not eat too much sugar, fat, or fast foods. You can still have dessert and treats now and then. The goal is moderation. · Start small to improve your eating habits. Pay attention to portion sizes, drink less juice and soda pop, and eat more fruits and vegetables. ¨ Eat a healthy amount of food. A 3-ounce serving of meat, for example, is about the size of a deck of cards. Fill the rest of your plate with vegetables and whole grains. ¨ Limit the amount of soda and sports drinks you have every day. Drink more water when you are thirsty. ¨ Eat at least 5 servings of fruits and vegetables every day. It may seem like a lot, but it is not hard to reach this goal. A serving or helping is 1 piece of fruit, 1 cup of vegetables, or 2 cups of leafy, raw vegetables. Have an apple or some carrot sticks as an afternoon snack instead of a candy bar. Try to have fruits and/or vegetables at every meal.  · Make exercise part of your daily routine. You may want to start with simple activities, such as walking, bicycling, or slow swimming. Try to be active 30 to 60 minutes every day. You do not need to do all 30 to 60 minutes all at once. For example, you can exercise 3 times a day for 10 or 20 minutes.  Moderate exercise is safe for most people, but it is always a good idea to talk to your doctor before starting an exercise program.  · Keep moving. Nataliya Morgan the lawn, work in the garden, or [place]. Take the stairs instead of the elevator at work. · If you smoke, quit. People who smoke have an increased risk for heart attack, stroke, cancer, and other lung illnesses. Quitting is hard, but there are ways to boost your chance of quitting tobacco for good. ¨ Use nicotine gum, patches, or lozenges. ¨ Ask your doctor about stop-smoking programs and medicines. ¨ Keep trying. In addition to reducing your risk of diseases in the future, you will notice some benefits soon after you stop using tobacco. If you have shortness of breath or asthma symptoms, they will likely get better within a few weeks after you quit. · Limit how much alcohol you drink. Moderate amounts of alcohol (up to 2 drinks a day for men, 1 drink a day for women) are okay. But drinking too much can lead to liver problems, high blood pressure, and other health problems. Family health  If you have a family, there are many things you can do together to improve your health. · Eat meals together as a family as often as possible. · Eat healthy foods. This includes fruits, vegetables, lean meats and dairy, and whole grains. · Include your family in your fitness plan. Most people think of activities such as jogging or tennis as the way to fitness, but there are many ways you and your family can be more active. Anything that makes you breathe hard and gets your heart pumping is exercise. Here are some tips:  ¨ Walk to do errands or to take your child to school or the bus. ¨ Go for a family bike ride after dinner instead of watching TV. Where can you learn more? Go to http://julito-tiffanie.info/. Enter E496 in the search box to learn more about \"A Healthy Lifestyle: Care Instructions. \"  Current as of: July 26, 2016  Content Version: 11.3  © 1706-3149 [employer]. Care instructions adapted under license by Meilimei (which disclaims liability or warranty for this information). If you have questions about a medical condition or this instruction, always ask your healthcare professional. [address] any warranty or liability for your use of this information.

## 2017-09-25 NOTE — LETTER
NOTIFICATION RETURN TO WORK / SCHOOL 
 
9/25/2017 4:17 PM 
 
[facility] [phone] To Whom It May Concern: 
 
Cuba Fry is currently under the care of [address] [place] [address]. His wife is his only means of transportation and had to bring him to his visit today 9/25/17 If there are questions or concerns please have the patient contact our office. Sincerely, Betty Fish MD

## 2017-09-25 NOTE — PATIENT INSTRUCTIONS
Keep a record of your blood sugars and how much insulin you are taking and mail that log to me in 2 weeks.

## 2017-09-26 NOTE — TELEPHONE ENCOUNTER
Please Advise:    Patient would like a letter for his wife stating she accompanied him to his appointment. Patient was told doppler order was mailed to him.

## 2017-09-27 ENCOUNTER — DOCUMENTATION ONLY (OUTPATIENT)
Dept: NEUROLOGY | Age: 57
End: 2017-09-27

## 2018-02-21 RX ORDER — AMLODIPINE BESYLATE 10 MG/1
10 TABLET ORAL DAILY
Qty: 90 TAB | Refills: 2 | Status: SHIPPED | OUTPATIENT
Start: 2018-02-21

## 2018-04-19 ENCOUNTER — OFFICE VISIT (OUTPATIENT)
Dept: NEUROLOGY | Age: 58
End: 2018-04-19

## 2018-04-19 ENCOUNTER — TELEPHONE (OUTPATIENT)
Dept: NEUROLOGY | Age: 58
End: 2018-04-19

## 2018-04-19 VITALS
SYSTOLIC BLOOD PRESSURE: 160 MMHG | BODY MASS INDEX: 40.43 KG/M2 | HEART RATE: 75 BPM | DIASTOLIC BLOOD PRESSURE: 80 MMHG | OXYGEN SATURATION: 96 % | HEIGHT: 74 IN | WEIGHT: 315 LBS

## 2018-04-19 DIAGNOSIS — E11.42 DIABETIC PERIPHERAL NEUROPATHY ASSOCIATED WITH TYPE 2 DIABETES MELLITUS (HCC): ICD-10-CM

## 2018-04-19 DIAGNOSIS — G81.10 SPASTIC HEMIPARESIS AFFECTING DOMINANT SIDE (HCC): ICD-10-CM

## 2018-04-19 DIAGNOSIS — I65.23 BILATERAL CAROTID ARTERY STENOSIS: ICD-10-CM

## 2018-04-19 DIAGNOSIS — I63.312 CEREBRAL INFARCTION DUE TO THROMBOSIS OF LEFT MIDDLE CEREBRAL ARTERY (HCC): ICD-10-CM

## 2018-04-19 DIAGNOSIS — R47.01 EXPRESSIVE APHASIA: ICD-10-CM

## 2018-04-19 DIAGNOSIS — G40.209 COMPLEX PARTIAL SEIZURES EVOLVING TO GENERALIZED TONIC-CLONIC SEIZURES (HCC): ICD-10-CM

## 2018-04-19 DIAGNOSIS — I63.9 STROKE DETERMINED BY CLINICAL ASSESSMENT (HCC): Primary | ICD-10-CM

## 2018-04-19 PROBLEM — E66.01 OBESITY, MORBID (HCC): Status: ACTIVE | Noted: 2018-04-19

## 2018-04-19 RX ORDER — GABAPENTIN 300 MG/1
300 CAPSULE ORAL 3 TIMES DAILY
Qty: 100 CAP | Refills: 11 | Status: ON HOLD | OUTPATIENT
Start: 2018-04-19 | End: 2021-05-29 | Stop reason: SDUPTHER

## 2018-04-19 NOTE — LETTER
4/19/2018 9:04 PM 
 
Patient:  Frankie Parada YOB: 1960 Date of Visit: 4/19/2018 Dear No Recipients: Thank you for referring Mr. Frankie Parada to me for evaluation/treatment. Below are the relevant portions of my assessment and plan of care. Consult Subjective:  
 
Frankie Parada is a 61 y.o. right-handed Afro-American male seen today for evaluation at the request of Dr. Zeinab Salcido with my last visit being over 3 years ago of new problem of increasing burning numbness in his hands and feet that at times he is relatively intense to the point that he has difficulty moving his fingers. The fingers do not seem to swell, and movement of the fingers does not seem to normally cause the pain, and almost seems to be more neuropathic pain, probably related to his diabetic neuropathy. He can come and go at any time, and does not seem to be related to use of the hands, so I am not sure it really is a carpal tunnel because he has a minimal Tinel's over his median nerves. We will place him on gabapentin see if that helps, but the symptoms persist he may need an EMG study to rule out carpal tunnel syndrome. His other new complaint of new problem is increasing palpitations and a two-year fluttering sensation in his chest, and a feeling of arrhythmias, so we referred him to cardiology [facility] for evaluation of cardiac problems. He is also seen for evaluation of his past history of of seizures and stroke. Patient had sudden worsening of gait after therapy in May 2015, and was seen in our office for evaluation for possible stroke because he couldn't walk well again. His MRA suggested some intracranial disease the images are compromised and Dopplers are negative and were just done 2 months ago apparently. [facility] MRI showed prominent old infarcts and microvascular disease, but no clear new stroke.  He has been given physical therapy with improvement in his gait, and he is getting back to his normal baseline. Patient has had no further seizures or stroke since his last visit in July. He is off Keppra now for 6 months and has had no seizures. He had an EEG in December and that just showed mild generalized slowing, a little more prominent on the left side, but no spikes or seizures. He also wants to drive again. I told him not to drive but go to [facility] and retake his license pulled on the road and written exam, and if he could pass goes he could drive. Patient was admitted on May 28, 2014, with new expressive aphasia and right-sided weakness and some right facial twitching. He has no history of heart disease or atrial fibrillation. We told him to go off the aspirin and start Plavix as a better antiplatelet agent. He has done much better with therapy, and is walking with a cane now because of his charcoal joints in his feet and speaking better. He is now in outpatient therapy. His diabetes is better controlled and his blood pressure is much better. We advised him to control his diabetes, controlled his cholesterol, remain off cigarettes, and continue good blood pressure control and take his Plavix regularly, and remained mentally and physically active, and take a vitamin and vitamin D every day. He has had no other new neurologic symptoms as far as weakness numbness or vision problems or other strokelike symptoms. He was advised on how to recognize strokelike symptoms. He has a past medical history stroke, diabetes, high blood pressure, hyperlipidemia, questionable seizure. On complete review of systems and symptoms, he has had no other new medical problems, complications or illnesses recently. Past Medical History:  
Diagnosis Date  Diabetes ([facility] [facility]  Neurological disorder History reviewed. No pertinent surgical history. Family History Problem Relation Age of Onset  Cancer Mother   
  breast  
 Diabetes Mother  Hypertension Mother  Elevated Lipids Mother  Hypertension Father  Diabetes Father  Heart Disease Father  Elevated Lipids Father  Cancer Father   
  unknown  No Known Problems Sister  No Known Problems Brother  No Known Problems Sister  Arthritis-osteo Child Social History Substance Use Topics  Smoking status: Current Some Day Smoker Packs/day: 0.25 Years: 30.00  Smokeless tobacco: Never Used  Alcohol use 0.5 oz/week 1 Cans of beer per week Comment: social  
   
Current Outpatient Prescriptions Medication Sig Dispense Refill  gabapentin (NEURONTIN) 300 mg capsule Take 1 Cap by mouth three (3) times daily. Indications: NEUROPATHIC PAIN, POSTHERPETIC NEURALGIA 100 Cap 11  
 amLODIPine (NORVASC) 10 mg tablet Take 1 Tab by mouth daily. 90 Tab 2  
 glucose blood VI test strips (ACCU-CHEK SMARTVIEW TEST STRIP) strip Test 2-3 times daily 300 Strip 3  
 losartan (COZAAR) 100 mg tablet TAKE ONE TABLET BY MOUTH ONCE DAILY 90 Tab 3  clopidogrel (PLAVIX) 75 mg tab Take 1 Tab by mouth daily. 90 Tab 0  BD INSULIN SYRINGE 1 mL 28 gauge x 1/2\" syrg Use two times per day with insulin  DX: E11.42 (patient wanted longer needle) 100 Syringe 11  
 insulin syringe-needle U-100 1 mL 31 gauge x 15/64\" syrg 1 Syringe by Does Not Apply route two (2) times a day. Use to inject insulin two times per day. DX: E11.42 200 Pen Needle 3  
 insulin NPH/insulin regular (NOVOLIN 70/30, HUMULIN 70/30) 100 unit/mL (70-30) injection Inject  units in AM and PM with meals. Dispense novolin or humulin brand or relion brand whichever is cheaper 6 Vial 6  
 atorvastatin (LIPITOR) 10 mg tablet Take 1 Tab by mouth nightly. 90 Tab 3  
 metFORMIN (GLUCOPHAGE) 1,000 mg tablet Take 1 Tab by mouth two (2) times daily (with meals). Indications: TYPE 2 DIABETES MELLITUS 180 Tab 3  
 aspirin delayed-release 81 mg tablet Take 1 Tab by mouth daily.  30 Tab 11  
  Multivit,Ca,Iron-FA-Lyco-Lut (CENTRAL-DUY) -735-250 mg-mcg-mcg-mcg tab Take  by mouth.  Cholecalciferol, Vitamin D3, (VITAMIN D3) 1,000 unit cap Take  by mouth.  Hydrochlorothiazide 12.5 mg tablet Take 12.5 mg by mouth daily. 30 Tab 0  
 metoprolol (LOPRESSOR) 50 mg tablet Take 50 mg by mouth two (2) times a day. Allergies Allergen Reactions  Lisinopril Cough Review of Systems: A comprehensive review of systems was negative except for: Musculoskeletal: positive for myalgias, arthralgias and stiff joints Neurological: positive for speech problems, coordination problems, gait problems and weakness Endocrine: positive for increased blood sugars Vitals:  
 04/19/18 1518 BP: 160/80 Pulse: 75 SpO2: 96% Weight: 334 lb (151.5 kg) Height: 6' 2\" (1.88 m) Objective: I 
 
 
NEUROLOGICAL EXAM: 
 
Appearance: The patient is well developed, well nourished, provides a poor history because of his expressive aphasia, and is in no acute distress. Mental Status: Oriented to time, place and person and the president. He has a moderate expressive aphasia, but his cognitive function and fund of knowledge seemed normal for him. Mood and affect appropriate. Cranial Nerves:   Intact visual fields. Fundi are benign. MIGEL, EOM's full, no nystagmus , no ptosis. Facial sensation is normal. Corneal reflexes are not tested. Hearing is normal bilaterally. Palate is midline with normal sternocleidomastoid and trapezius muscles are normal. Tongue is midline. He has mild right facial asymmetry, but normal facial sensation. Motor:  5/5 strength in upper and lower proximal and distal muscles on the left, and moderate severe spastic right hemiparesis on the right with greater involvement in his arm. Normal bulk and tone. No fasciculations. Reflexes:   Deep tendon reflexes 2+/4 on the right, and 1+/4 on the left. 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 Tremor:   No tremor noted. Cerebellar:  Abnormal Romberg and tandem cerebellar signs present. Neurovascular:  Normal heart sounds and regular rhythm, peripheral pulses decreased in both feet, and no carotid bruits. Assessment: ICD-10-CM ICD-9-CM 1. Stroke determined by clinical assessment ([facility] I63.9 434.91 REFERRAL TO CARDIOLOGY  
   gabapentin (NEURONTIN) 300 mg capsule 2. Spastic hemiparesis affecting dominant side ([facility]) G81.10 342.11 REFERRAL TO CARDIOLOGY  
   gabapentin (NEURONTIN) 300 mg capsule 3. Cerebral infarction due to thrombosis of left middle cerebral artery ([facility]) I63.312 434.01 REFERRAL TO CARDIOLOGY  
   gabapentin (NEURONTIN) 300 mg capsule 4. Bilateral carotid artery stenosis I65.23 433.10 REFERRAL TO CARDIOLOGY  
  433.30 gabapentin (NEURONTIN) 300 mg capsule 5. Diabetic peripheral neuropathy associated with type 2 diabetes mellitus ([facility]) E11.42 250.60 REFERRAL TO CARDIOLOGY  
  357.2 gabapentin (NEURONTIN) 300 mg capsule 6. Complex partial seizures evolving to generalized tonic-clonic seizures ([facility]) G40.209 345.40 REFERRAL TO CARDIOLOGY  
   gabapentin (NEURONTIN) 300 mg capsule 7. Expressive aphasia R47.01 784.3 REFERRAL TO CARDIOLOGY  
   gabapentin (NEURONTIN) 300 mg capsule Plan:  
 
Patient to try Neurontin for this neuropathic type pain in his hands, and if that persists he will get an EMG study scheduled, to rule out carpal tunnel syndrome in addition to his diabetic neuropathy Hopefully the Neurontin will help his neuropathy too For his chest palpitations and pain he will be referred to cardiology at [facility] 
Patient to remained off Eri Lolling and go to [facility] if he remains seizure-free for 's evaluation Patient is to continue Plavix in view of his recent stroke He will return in 12 months time for follow-up and possibly a carotid Doppler Patient advised with stroke instructions on how to recognize a stroke in the symptoms of a stroke, nature he controls his blood pressure, diabetes, and cholesterol, and stay off cigarettes, and exercises regularly and remained mentally and physically active and take his medications as prescribed We will see him again in 12 months time or earlier if needed, and he is to finish his therapy Signed By: Angie Cuellar MD   
 April 19, 2018 This note will not be viewable in [address] If you have questions, please do not hesitate to call me. I look forward to following Mr. Haydee Braden along with you. Sincerely, Angie Cuellar MD

## 2018-04-19 NOTE — LETTER
NOTIFICATION RETURN TO WORK / SCHOOL 
 
4/19/2018 4:05 PM 
 
Mr. Imelda Gil [facility] [address] [phone] To Whom It May Concern: 
 
Imelda Gil is currently under the care of [address] [place] [address] and was accompanied by his wife, Miki Lentz. If there are questions or concerns please have the patient contact our office. Sincerely, Yuli Menard MD

## 2018-04-19 NOTE — MR AVS SNAPSHOT
[address], 
[place], [address] [facility] 
[phone] Patient: Concepcion Collier MRN: [id number] EAO:5/19/1945 Visit Information Date & Time Provider Department Dept. Phone Encounter #  
 4/19/2018  3:00 PM Malik Dolan MD Neurology [facility] at [facility] [phone] [id number] Follow-up Instructions Return in about 1 year (around 4/19/2019). Your Appointments 7/23/2018 11:30 AM  
Follow Up with Ilan Arriaga MD  
[facility] [address] [facility]) Appt Note: f/u         concepción from  03/21/18; f/u concepción from 03/21/18  
 [address] [facility] [id number] [address] [facility] Upcoming Health Maintenance Date Due Hepatitis C Screening 1960 Pneumococcal 19-64 Medium Risk (1 of 1 - PPSV23) 4/29/1978 DTaP/Tdap/Td series (1 - Tdap) 4/29/1980 FOBT Q 1 YEAR AGE 50-75 4/29/2009 EYE EXAM RETINAL OR DILATED Q1 12/8/2016 Influenza Age 5 to Adult 8/1/2017 MEDICARE YEARLY EXAM 3/14/2018 HEMOGLOBIN A1C Q6M 3/25/2018 MICROALBUMIN Q1 7/24/2018 FOOT EXAM Q1 9/18/2018 LIPID PANEL Q1 9/25/2018 Allergies as of 4/19/2018  Review Complete On: 4/19/2018 By: Malik Dolan MD  
  
 Severity Noted Reaction Type Reactions Lisinopril  11/28/2016    Cough Current Immunizations  Never Reviewed No immunizations on file. Not reviewed this visit You Were Diagnosed With   
  
 Codes Comments Stroke determined by clinical assessment ([facility]    -  Primary ICD-10-CM: I63.9 ICD-9-CM: 434.91 Spastic hemiparesis affecting dominant side (HCC)     ICD-10-CM: G81.10 ICD-9-CM: 342.11 Cerebral infarction due to thrombosis of left middle cerebral artery (HCC)     ICD-10-CM: I12.532 ICD-9-CM: 434.01   
 Bilateral carotid artery stenosis     ICD-10-CM: I65.23 ICD-9-CM: 433.10, 433.30 Diabetic peripheral neuropathy associated with type 2 diabetes mellitus (HCC)     ICD-10-CM: E11.42 
ICD-9-CM: 250.60, 357.2 Complex partial seizures evolving to generalized tonic-clonic seizures ([facility]     ICD-10-CM: I96.488 ICD-9-CM: 345.40 Expressive aphasia     ICD-10-CM: R47.01 
ICD-9-CM: 221. 3 Vitals BP Pulse Height(growth percentile) Weight(growth percentile) SpO2 BMI  
 160/80 75 6' 2\" (1.88 m) 334 lb (151.5 kg) 96% 42.88 kg/m2 Smoking Status Current Some Day Smoker BMI and BSA Data Body Mass Index Body Surface Area  
 42.88 kg/m 2 2.81 m 2 Preferred Pharmacy Pharmacy Name Phone [facility] [address] [facility] [address] [facility] [phone] Your Updated Medication List  
  
   
This list is accurate as of 4/19/18  3:34 PM.  Always use your most recent med list. amLODIPine 10 mg tablet Commonly known as:  Jamia Chimes Take 1 Tab by mouth daily. aspirin delayed-release 81 mg tablet Take 1 Tab by mouth daily. atorvastatin 10 mg tablet Commonly known as:  LIPITOR Take 1 Tab by mouth nightly. CENTRAL-DUY -580-250 mg-mcg-mcg-mcg Tab Generic drug:  Multivit,Ca,Iron-FA-Lyco-Lut Take  by mouth. clopidogrel 75 mg Tab Commonly known as:  PLAVIX Take 1 Tab by mouth daily. gabapentin 300 mg capsule Commonly known as:  NEURONTIN Take 1 Cap by mouth three (3) times daily. Indications: NEUROPATHIC PAIN, POSTHERPETIC NEURALGIA  
  
 glucose blood VI test strips strip Commonly known as:  [address] Test 2-3 times daily  
  
 hydroCHLOROthiazide 12.5 mg tablet Commonly known as:  HYDRODIURIL Take 12.5 mg by mouth daily. insulin NPH/insulin regular 100 unit/mL (70-30) injection Commonly known as:  NOVOLIN 70/30, HUMULIN 70/30 Inject  units in AM and PM with meals. Dispense novolin or humulin brand or relion brand whichever is cheaper * insulin syringe-needle U-100 1 mL 31 gauge x 15/64\" Syrg 1 Syringe by Does Not Apply route two (2) times a day. Use to inject insulin two times per day. DX: E11.42  
  
 * BD INSULIN SYRINGE 1 mL 28 gauge x 1/2\" Syrg Generic drug:  Insulin Syringe-Needle U-100 Use two times per day with insulin  DX: E11.42 (patient wanted longer needle)  
  
 losartan 100 mg tablet Commonly known as:  COZAAR  
TAKE ONE TABLET BY MOUTH ONCE DAILY  
  
 metFORMIN 1,000 mg tablet Commonly known as:  GLUCOPHAGE Take 1 Tab by mouth two (2) times daily (with meals). Indications: TYPE 2 DIABETES MELLITUS  
  
 metoprolol tartrate 50 mg tablet Commonly known as:  LOPRESSOR Take 50 mg by mouth two (2) times a day. VITAMIN D3 1,000 unit Cap Generic drug:  cholecalciferol Take  by mouth. * Notice: This list has 2 medication(s) that are the same as other medications prescribed for you. Read the directions carefully, and ask your doctor or other care provider to review them with you. Prescriptions Sent to Pharmacy Refills  
 gabapentin (NEURONTIN) 300 mg capsule 11 Sig: Take 1 Cap by mouth three (3) times daily. Indications: NEUROPATHIC PAIN, POSTHERPETIC NEURALGIA Class: Normal  
 Pharmacy: [facility] DR JOANNA GÓMEZ [address] [place] [address], [facility] Ph #: [phone] Route: Oral  
  
We Performed the Following REFERRAL TO CARDIOLOGY [WPW34 Custom] Comments:  
 palpitations Follow-up Instructions Return in about 1 year (around 4/19/2019). Referral Information Referral ID Referred By Referred To  
  
 [id number] Nic Lozoya MD   
   [address] Phone: [phone] Fax: [phone] Visits Status Start Date End Date 1 New Request 4/19/18 4/19/19 If your referral has a status of pending review or denied, additional information will be sent to support the outcome of this decision. Patient Instructions Office Policies Phone calls/patient messages: Please allow up to 24 hours for someone in the office to contact you about your call or message. Be mindful your provider may be out of the office or your message may require further review. We encourage you to use [facility] for your messages as this is a faster, more efficient way to communicate with our office Medication Refills: 
 Prescription medications require 48 business hours to process. We encourage you to use [facility] for your refills. For controlled medications: Please allow 72 business hours to process. Certain medications may require you to  a written prescription at our office. NO narcotic/controlled medications will be prescribed after 4pm Monday through Friday or on weekends Form/Paperwork Completion: 
 Please note there is a $25 fee for all paperwork completed by our providers. We ask that you allow 7-14 business days. Pre-payment is due prior to picking up/faxing the completed form. You may also download your forms to [facility] to have your doctor print off. A Healthy Lifestyle: Care Instructions Your Care Instructions A healthy lifestyle can help you feel good, stay at a healthy weight, and have plenty of energy for both work and play. A healthy lifestyle is something you can share with your whole family. A healthy lifestyle also can lower your risk for serious health problems, such as high blood pressure, heart disease, and diabetes. You can follow a few steps listed below to improve your health and the health of your family. Follow-up care is a key part of your treatment and safety. Be sure to make and go to all appointments, and call your doctor if you are having problems.  It's also a good idea to know your test results and keep a list of the medicines you take. How can you care for yourself at home? · Do not eat too much sugar, fat, or fast foods. You can still have dessert and treats now and then. The goal is moderation. · Start small to improve your eating habits. Pay attention to portion sizes, drink less juice and soda pop, and eat more fruits and vegetables. ¨ Eat a healthy amount of food. A 3-ounce serving of meat, for example, is about the size of a deck of cards. Fill the rest of your plate with vegetables and whole grains. ¨ Limit the amount of soda and sports drinks you have every day. Drink more water when you are thirsty. ¨ Eat at least 5 servings of fruits and vegetables every day. It may seem like a lot, but it is not hard to reach this goal. A serving or helping is 1 piece of fruit, 1 cup of vegetables, or 2 cups of leafy, raw vegetables. Have an apple or some carrot sticks as an afternoon snack instead of a candy bar. Try to have fruits and/or vegetables at every meal. 
· Make exercise part of your daily routine. You may want to start with simple activities, such as walking, bicycling, or slow swimming. Try to be active 30 to 60 minutes every day. You do not need to do all 30 to 60 minutes all at once. For example, you can exercise 3 times a day for 10 or 20 minutes. Moderate exercise is safe for most people, but it is always a good idea to talk to your doctor before starting an exercise program. 
· Keep moving. Mari Pour the lawn, work in the garden, or Apportable. Take the stairs instead of the elevator at work. · If you smoke, quit. People who smoke have an increased risk for heart attack, stroke, cancer, and other lung illnesses. Quitting is hard, but there are ways to boost your chance of quitting tobacco for good. ¨ Use nicotine gum, patches, or lozenges. ¨ Ask your doctor about stop-smoking programs and medicines. ¨ Keep trying.  
In addition to reducing your risk of diseases in the future, you will notice some benefits soon after you stop using tobacco. If you have shortness of breath or asthma symptoms, they will likely get better within a few weeks after you quit. · Limit how much alcohol you drink. Moderate amounts of alcohol (up to 2 drinks a day for men, 1 drink a day for women) are okay. But drinking too much can lead to liver problems, high blood pressure, and other health problems. Family health If you have a family, there are many things you can do together to improve your health. · Eat meals together as a family as often as possible. · Eat healthy foods. This includes fruits, vegetables, lean meats and dairy, and whole grains. · Include your family in your fitness plan. Most people think of activities such as jogging or tennis as the way to fitness, but there are many ways you and your family can be more active. Anything that makes you breathe hard and gets your heart pumping is exercise. Here are some tips: 
¨ Walk to do errands or to take your child to school or the bus. ¨ Go for a family bike ride after dinner instead of watching TV. Where can you learn more? Go to http://julitoCityzenithtiffanie.info/. Enter A408 in the search box to learn more about \"A Healthy Lifestyle: Care Instructions. \" Current as of: May 12, 2017 Content Version: 11.4 © 7794-4582 [employer]. Care instructions adapted under license by [employer] (which disclaims liability or warranty for this information). If you have questions about a medical condition or this instruction, always ask your healthcare professional. [address] any warranty or liability for your use of this information. Introducing [facility]! Clifton Zuniga introduces [employer] patient portal. Now you can access parts of your medical record, email your doctor's office, and request medication refills online.    
 
1. In your internet browser, go to [email or web address] 2. Click on the First Time User? Click Here link in the Sign In box. You will see the New Member Sign Up page. 3. Enter your [employer] Access Code exactly as it appears below. You will not need to use this code after youve completed the sign-up process. If you do not sign up before the expiration date, you must request a new code. · [employer] Access Code: [email or web address] Expires: 7/18/2018  3:33 PM 
 
4. Enter the last four digits of your Social Security Number (xxxx) and Date of Birth (mm/dd/yyyy) as indicated and click Submit. You will be taken to the next sign-up page. 5. Create a iCenterat ID. This will be your [employer] login ID and cannot be changed, so think of one that is secure and easy to remember. 6. Create a [employer] password. You can change your password at any time. 7. Enter your Password Reset Question and Answer. This can be used at a later time if you forget your password. 8. Enter your e-mail address. You will receive e-mail notification when new information is available in [address] 9. Click Sign Up. You can now view and download portions of your medical record. 10. Click the Download Summary menu link to download a portable copy of your medical information. If you have questions, please visit the Frequently Asked Questions section of the [employer] website. Remember, [employer] is NOT to be used for urgent needs. For medical emergencies, dial 911. Now available from your iPhone and Android! Please provide this summary of care documentation to your next provider. Your primary care clinician is listed as Doris Horne. If you have any questions after today's visit, please call [phone].

## 2018-04-19 NOTE — PATIENT INSTRUCTIONS
Office Policies       Phone calls/patient messages:   Please allow up to 24 hours for someone in the office to contact you about your call or message. Be mindful your provider may be out of the office or your message may require further review. We encourage you to use [employer] for your messages as this is a faster, more efficient way to communicate with our office         Medication Refills:   Prescription medications require 48 business hours to process. We encourage you to use [employer] for your refills. For controlled medications: Please allow 72 business hours to process. Certain medications may require you to  a written prescription at our office. NO narcotic/controlled medications will be prescribed after 4pm Monday through Friday or on weekends     Form/Paperwork Completion:   Please note there is a $25 fee for all paperwork completed by our providers. We ask that you allow 7-14 business days. Pre-payment is due prior to picking up/faxing the completed form. You may also download your forms to [employer] to have your doctor print off. A Healthy Lifestyle: Care Instructions  Your Care Instructions    A healthy lifestyle can help you feel good, stay at a healthy weight, and have plenty of energy for both work and play. A healthy lifestyle is something you can share with your whole family. A healthy lifestyle also can lower your risk for serious health problems, such as high blood pressure, heart disease, and diabetes. You can follow a few steps listed below to improve your health and the health of your family. Follow-up care is a key part of your treatment and safety. Be sure to make and go to all appointments, and call your doctor if you are having problems. It's also a good idea to know your test results and keep a list of the medicines you take. How can you care for yourself at home? · Do not eat too much sugar, fat, or fast foods. You can still have dessert and treats now and then.  The goal is moderation. · Start small to improve your eating habits. Pay attention to portion sizes, drink less juice and soda pop, and eat more fruits and vegetables. ¨ Eat a healthy amount of food. A 3-ounce serving of meat, for example, is about the size of a deck of cards. Fill the rest of your plate with vegetables and whole grains. ¨ Limit the amount of soda and sports drinks you have every day. Drink more water when you are thirsty. ¨ Eat at least 5 servings of fruits and vegetables every day. It may seem like a lot, but it is not hard to reach this goal. A serving or helping is 1 piece of fruit, 1 cup of vegetables, or 2 cups of leafy, raw vegetables. Have an apple or some carrot sticks as an afternoon snack instead of a candy bar. Try to have fruits and/or vegetables at every meal.  · Make exercise part of your daily routine. You may want to start with simple activities, such as walking, bicycling, or slow swimming. Try to be active 30 to 60 minutes every day. You do not need to do all 30 to 60 minutes all at once. For example, you can exercise 3 times a day for 10 or 20 minutes. Moderate exercise is safe for most people, but it is always a good idea to talk to your doctor before starting an exercise program.  · Keep moving. Candelario Boron the lawn, work in the garden, or [employer]. Take the stairs instead of the elevator at work. · If you smoke, quit. People who smoke have an increased risk for heart attack, stroke, cancer, and other lung illnesses. Quitting is hard, but there are ways to boost your chance of quitting tobacco for good. ¨ Use nicotine gum, patches, or lozenges. ¨ Ask your doctor about stop-smoking programs and medicines. ¨ Keep trying. In addition to reducing your risk of diseases in the future, you will notice some benefits soon after you stop using tobacco. If you have shortness of breath or asthma symptoms, they will likely get better within a few weeks after you quit.   · Limit how much alcohol you drink. Moderate amounts of alcohol (up to 2 drinks a day for men, 1 drink a day for women) are okay. But drinking too much can lead to liver problems, high blood pressure, and other health problems. Family health  If you have a family, there are many things you can do together to improve your health. · Eat meals together as a family as often as possible. · Eat healthy foods. This includes fruits, vegetables, lean meats and dairy, and whole grains. · Include your family in your fitness plan. Most people think of activities such as jogging or tennis as the way to fitness, but there are many ways you and your family can be more active. Anything that makes you breathe hard and gets your heart pumping is exercise. Here are some tips:  ¨ Walk to do errands or to take your child to school or the bus. ¨ Go for a family bike ride after dinner instead of watching TV. Where can you learn more? Go to http://julito-tiffanie.info/. Enter H635 in the search box to learn more about \"A Healthy Lifestyle: Care Instructions. \"  Current as of: May 12, 2017  Content Version: 11.4  © 1036-4313 [employer], [employer]. Care instructions adapted under license by [employer] (which disclaims liability or warranty for this information). If you have questions about a medical condition or this instruction, always ask your healthcare professional. [address] any warranty or liability for your use of this information.

## 2018-04-20 NOTE — PROGRESS NOTES
Consult    Subjective:     Criselda Zuluaga is a 61 y.o. right-handed Afro-American male seen today for evaluation at the request of Dr. Francis López with my last visit being over 3 years ago of new problem of increasing burning numbness in his hands and feet that at times he is relatively intense to the point that he has difficulty moving his fingers. The fingers do not seem to swell, and movement of the fingers does not seem to normally cause the pain, and almost seems to be more neuropathic pain, probably related to his diabetic neuropathy. He can come and go at any time, and does not seem to be related to use of the hands, so I am not sure it really is a carpal tunnel because he has a minimal Tinel's over his median nerves. We will place him on gabapentin see if that helps, but the symptoms persist he may need an EMG study to rule out carpal tunnel syndrome. His other new complaint of new problem is increasing palpitations and a two-year fluttering sensation in his chest, and a feeling of arrhythmias, so we referred him to cardiology [facility] for evaluation of cardiac problems. He is also seen for evaluation of his past history of of seizures and stroke. Patient had sudden worsening of gait after therapy in May 2015, and was seen in our office for evaluation for possible stroke because he couldn't walk well again. His MRA suggested some intracranial disease the images are compromised and Dopplers are negative and were just done 2 months ago apparently[facility] [facility] showed prominent old infarcts and microvascular disease, but no clear new stroke. He has been given physical therapy with improvement in his gait, and he is getting back to his normal baseline. Patient has had no further seizures or stroke since his last visit in July. He is off Keppra now for 6 months and has had no seizures.  He had an EEG in December and that just showed mild generalized slowing, a little more prominent on the left side, but no spikes or seizures. He also wants to drive again. I told him not to drive but go to [facility] and retake his license pulled on the road and written exam, and if he could pass goes he could drive. Patient was admitted on May 28, 2014, with new expressive aphasia and right-sided weakness and some right facial twitching. He has no history of heart disease or atrial fibrillation. We told him to go off the aspirin and start Plavix as a better antiplatelet agent. He has done much better with therapy, and is walking with a cane now because of his charcoal joints in his feet and speaking better. He is now in outpatient therapy. His diabetes is better controlled and his blood pressure is much better. We advised him to control his diabetes, controlled his cholesterol, remain off cigarettes, and continue good blood pressure control and take his Plavix regularly, and remained mentally and physically active, and take a vitamin and vitamin D every day. He has had no other new neurologic symptoms as far as weakness numbness or vision problems or other strokelike symptoms. He was advised on how to recognize strokelike symptoms. He has a past medical history stroke, diabetes, high blood pressure, hyperlipidemia, questionable seizure. On complete review of systems and symptoms, he has had no other new medical problems, complications or illnesses recently. Past Medical History:   Diagnosis Date    Diabetes [facility])     Neurological disorder       History reviewed. No pertinent surgical history.   Family History   Problem Relation Age of Onset    Cancer Mother      breast    Diabetes Mother     Hypertension Mother     Elevated Lipids Mother     Hypertension Father     Diabetes Father     Heart Disease Father     Elevated Lipids Father     Cancer Father      unknown    No Known Problems Sister     No Known Problems Brother     No Known Problems Sister     Arthritis-osteo Child       Social History   Substance Use Topics    Smoking status: Current Some Day Smoker     Packs/day: 0.25     Years: 30.00    Smokeless tobacco: Never Used    Alcohol use 0.5 oz/week     1 Cans of beer per week      Comment: social       Current Outpatient Prescriptions   Medication Sig Dispense Refill    gabapentin (NEURONTIN) 300 mg capsule Take 1 Cap by mouth three (3) times daily. Indications: NEUROPATHIC PAIN, POSTHERPETIC NEURALGIA 100 Cap 11    amLODIPine (NORVASC) 10 mg tablet Take 1 Tab by mouth daily. 90 Tab 2    glucose blood VI test strips (ACCU-CHEK SMARTVIEW TEST STRIP) strip Test 2-3 times daily 300 Strip 3    losartan (COZAAR) 100 mg tablet TAKE ONE TABLET BY MOUTH ONCE DAILY 90 Tab 3    clopidogrel (PLAVIX) 75 mg tab Take 1 Tab by mouth daily. 90 Tab 0    BD INSULIN SYRINGE 1 mL 28 gauge x 1/2\" syrg Use two times per day with insulin  DX: E11.42 (patient wanted longer needle) 100 Syringe 11    insulin syringe-needle U-100 1 mL 31 gauge x 15/64\" syrg 1 Syringe by Does Not Apply route two (2) times a day. Use to inject insulin two times per day. DX: E11.42 200 Pen Needle 3    insulin NPH/insulin regular (NOVOLIN 70/30, HUMULIN 70/30) 100 unit/mL (70-30) injection Inject  units in AM and PM with meals. Dispense novolin or humulin brand or relion brand whichever is cheaper 6 Vial 6    atorvastatin (LIPITOR) 10 mg tablet Take 1 Tab by mouth nightly. 90 Tab 3    metFORMIN (GLUCOPHAGE) 1,000 mg tablet Take 1 Tab by mouth two (2) times daily (with meals). Indications: TYPE 2 DIABETES MELLITUS 180 Tab 3    aspirin delayed-release 81 mg tablet Take 1 Tab by mouth daily. 30 Tab 11    Multivit,Ca,Iron-FA-Lyco-Lut (CENTRAL-DUY) -220-250 mg-mcg-mcg-mcg tab Take  by mouth.  Cholecalciferol, Vitamin D3, (VITAMIN D3) 1,000 unit cap Take  by mouth.  Hydrochlorothiazide 12.5 mg tablet Take 12.5 mg by mouth daily. 30 Tab 0    metoprolol (LOPRESSOR) 50 mg tablet Take 50 mg by mouth two (2) times a day. Allergies   Allergen Reactions    Lisinopril Cough        Review of Systems:  A comprehensive review of systems was negative except for: Musculoskeletal: positive for myalgias, arthralgias and stiff joints  Neurological: positive for speech problems, coordination problems, gait problems and weakness  Endocrine: positive for increased blood sugars   Vitals:    04/19/18 1518   BP: 160/80   Pulse: 75   SpO2: 96%   Weight: 334 lb (151.5 kg)   Height: 6' 2\" (1.88 m)     Objective:     I      NEUROLOGICAL EXAM:    Appearance: The patient is well developed, well nourished, provides a poor history because of his expressive aphasia, and is in no acute distress. Mental Status: Oriented to time, place and person and the president. He has a moderate expressive aphasia, but his cognitive function and fund of knowledge seemed normal for him. Mood and affect appropriate. Cranial Nerves:   Intact visual fields. Fundi are benign. MIGEL, EOM's full, no nystagmus , no ptosis. Facial sensation is normal. Corneal reflexes are not tested. Hearing is normal bilaterally. Palate is midline with normal sternocleidomastoid and trapezius muscles are normal. Tongue is midline. He has mild right facial asymmetry, but normal facial sensation. Motor:  5/5 strength in upper and lower proximal and distal muscles on the left, and moderate severe spastic right hemiparesis on the right with greater involvement in his arm. Normal bulk and tone. No fasciculations. Reflexes:   Deep tendon reflexes 2+/4 on the right, and 1+/4 on the left. No babinski or clonus present  Patient does not have a Tinel's that are that prominent over his median nerves   Sensory:   Abnormal to touch, pinprick and vibration in both feet, but double simultaneous stimulation is intact   Gait:  Abnormal gait because of moderate right hemiparesis and charcot joints of his feet, but able to ambulate with cane. Tremor:   No tremor noted.    Cerebellar:  Abnormal Romberg and tandem cerebellar signs present. Neurovascular:  Normal heart sounds and regular rhythm, peripheral pulses decreased in both feet, and no carotid bruits. Assessment:       ICD-10-CM ICD-9-CM    1. Stroke determined by clinical assessment ([facility] [address] I63.9 434.91 REFERRAL TO CARDIOLOGY      gabapentin (NEURONTIN) 300 mg capsule   2. Spastic hemiparesis affecting dominant side ([facility]) G81.10 342.11 REFERRAL TO CARDIOLOGY      gabapentin (NEURONTIN) 300 mg capsule   3. Cerebral infarction due to thrombosis of left middle cerebral artery ([facility]) I63.312 434.01 REFERRAL TO CARDIOLOGY      gabapentin (NEURONTIN) 300 mg capsule   4. Bilateral carotid artery stenosis I65.23 433.10 REFERRAL TO CARDIOLOGY     433.30 gabapentin (NEURONTIN) 300 mg capsule   5. Diabetic peripheral neuropathy associated with type 2 diabetes mellitus ([facility]) E11.42 250.60 REFERRAL TO CARDIOLOGY     357.2 gabapentin (NEURONTIN) 300 mg capsule   6. Complex partial seizures evolving to generalized tonic-clonic seizures ([facility]) G40.209 345.40 REFERRAL TO CARDIOLOGY      gabapentin (NEURONTIN) 300 mg capsule   7.  Expressive aphasia R47.01 784.3 REFERRAL TO CARDIOLOGY      gabapentin (NEURONTIN) 300 mg capsule         Plan:     Patient to try Neurontin for this neuropathic type pain in his hands, and if that persists he will get an EMG study scheduled, to rule out carpal tunnel syndrome in addition to his diabetic neuropathy  Hopefully the Neurontin will help his neuropathy too  For his chest palpitations and pain he will be referred to cardiology at [facility]  Patient to remained off [address] and go to [facility] if he remains seizure-free for 's evaluation  Patient is to continue Plavix in view of his recent stroke  He will return in 12 months time for follow-up and possibly a carotid Doppler   Patient advised with stroke instructions on how to recognize a stroke in the symptoms of a stroke, nature he controls his blood pressure, diabetes, and cholesterol, and stay off cigarettes, and exercises regularly and remained mentally and physically active and take his medications as prescribed  We will see him again in 12 months time or earlier if needed, and he is to finish his therapy    Signed By: Mirtha Coleman MD     April 19, 2018       This note will not be viewable in [address]

## 2018-04-30 ENCOUNTER — TELEPHONE (OUTPATIENT)
Dept: NEUROLOGY | Age: 58
End: 2018-04-30

## 2018-04-30 NOTE — LETTER
4/30/2018 4:19 PM 
 
RE:    Della Méndez [phone] Thank you for agreeing to see Hugh Childers I am referring my patient to you for evaluation of palpitations. Please see his 
pertinent patient information below. Office Visit 4/19/2018 Neurology Clinic at [facility] Lizzy Fuentes MD  
Neurology    Stroke determined by clinical assessment [facility]) +6 more Dx    Follow-up Reason for visit Progress Notes Consult 
  
Subjective:  
  
Della Mares is a 61 y.o. right-handed Afro-American male seen today for evaluation at the request of Dr. Deyvi Carey with my last visit being over 3 years ago of new problem of increasing burning numbness in his hands and feet that at times he is relatively intense to the point that he has difficulty moving his fingers. The fingers do not seem to swell, and movement of the fingers does not seem to normally cause the pain, and almost seems to be more neuropathic pain, probably related to his diabetic neuropathy. He can come and go at any time, and does not seem to be related to use of the hands, so I am not sure it really is a carpal tunnel because he has a minimal Tinel's over his median nerves. We will place him on gabapentin see if that helps, but the symptoms persist he may need an EMG study to rule out carpal tunnel syndrome. His other new complaint of new problem is increasing palpitations and a two-year fluttering sensation in his chest, and a feeling of arrhythmias, so we referred him to cardiology [facility] for evaluation of cardiac problems. He is also seen for evaluation of his past history of of seizures and stroke. Patient had sudden worsening of gait after therapy in May 2015, and was seen in our office for evaluation for possible stroke because he couldn't walk well again.  His MRA suggested some intracranial disease the images are compromised and Dopplers are negative and were just done 2 months ago apparently. [facility] [place] MRI showed prominent old infarcts and microvascular disease, but no clear new stroke. He has been given physical therapy with improvement in his gait, and he is getting back to his normal baseline. Patient has had no further seizures or stroke since his last visit in July. He is off Keppra now for 6 months and has had no seizures. He had an EEG in December and that just showed mild generalized slowing, a little more prominent on the left side, but no spikes or seizures. He also wants to drive again. I told him not to drive but go to [facility] and retake his license pulled on the road and written exam, and if he could pass goes he could drive. Patient was admitted on May 28, 2014, with new expressive aphasia and right-sided weakness and some right facial twitching. He has no history of heart disease or atrial fibrillation. We told him to go off the aspirin and start Plavix as a better antiplatelet agent. He has done much better with therapy, and is walking with a cane now because of his charcoal joints in his feet and speaking better. He is now in outpatient therapy. His diabetes is better controlled and his blood pressure is much better. We advised him to control his diabetes, controlled his cholesterol, remain off cigarettes, and continue good blood pressure control and take his Plavix regularly, and remained mentally and physically active, and take a vitamin and vitamin D every day. He has had no other new neurologic symptoms as far as weakness numbness or vision problems or other strokelike symptoms. He was advised on how to recognize strokelike symptoms. 
  
He has a past medical history stroke, diabetes, high blood pressure, hyperlipidemia, questionable seizure.  On complete review of systems and symptoms, he has had no other new medical problems, complications or illnesses recently. 
  
    
 Past Medical History:  
Diagnosis Date  Diabetes ([facility] [address]    
 Neurological disorder    
  
History reviewed. No pertinent surgical history. Family History Problem Relation Age of Onset  Cancer Mother    
    breast  
 Diabetes Mother    
 Hypertension Mother    
 Elevated Lipids Mother    
 Hypertension Father    
 Diabetes Father    
 Heart Disease Father    
 Elevated Lipids Father    
 Cancer Father    
    unknown  No Known Problems Sister    
 No Known Problems Brother    
 No Known Problems Sister    
 Arthritis-osteo Child    
  
       
Social History Substance Use Topics  Smoking status: Current Some Day Smoker  
    Packs/day: 0.25  
    Years: 30.00  Smokeless tobacco: Never Used  Alcohol use 0.5 oz/week   
    1 Cans of beer per week   
      Comment: social  
   
      
Current Outpatient Prescriptions Medication Sig Dispense Refill  gabapentin (NEURONTIN) 300 mg capsule Take 1 Cap by mouth three (3) times daily. Indications: NEUROPATHIC PAIN, POSTHERPETIC NEURALGIA 100 Cap 11  
 amLODIPine (NORVASC) 10 mg tablet Take 1 Tab by mouth daily. 90 Tab 2  
 glucose blood VI test strips (ACCU-CHEK SMARTVIEW TEST STRIP) strip Test 2-3 times daily 300 Strip 3  
 losartan (COZAAR) 100 mg tablet TAKE ONE TABLET BY MOUTH ONCE DAILY 90 Tab 3  clopidogrel (PLAVIX) 75 mg tab Take 1 Tab by mouth daily. 90 Tab 0  BD INSULIN SYRINGE 1 mL 28 gauge x 1/2\" syrg Use two times per day with insulin  DX: E11.42 (patient wanted longer needle) 100 Syringe 11  
 insulin syringe-needle U-100 1 mL 31 gauge x 15/64\" syrg 1 Syringe by Does Not Apply route two (2) times a day. Use to inject insulin two times per day. DX: E11.42 200 Pen Needle 3  
 insulin NPH/insulin regular (NOVOLIN 70/30, HUMULIN 70/30) 100 unit/mL (70-30) injection Inject  units in AM and PM with meals. Dispense novolin or humulin brand or relion brand whichever is cheaper 6 Vial 6  atorvastatin (LIPITOR) 10 mg tablet Take 1 Tab by mouth nightly. 90 Tab 3  
 metFORMIN (GLUCOPHAGE) 1,000 mg tablet Take 1 Tab by mouth two (2) times daily (with meals). Indications: TYPE 2 DIABETES MELLITUS 180 Tab 3  
 aspirin delayed-release 81 mg tablet Take 1 Tab by mouth daily. 30 Tab 11  Multivit,Ca,Iron-FA-Lyco-Lut (CENTRAL-DUY) -970-250 mg-mcg-mcg-mcg tab Take  by mouth.      
 Cholecalciferol, Vitamin D3, (VITAMIN D3) 1,000 unit cap Take  by mouth.      
 Hydrochlorothiazide 12.5 mg tablet Take 12.5 mg by mouth daily. 30 Tab 0  
 metoprolol (LOPRESSOR) 50 mg tablet Take 50 mg by mouth two (2) times a day.      
  
  
    
Allergies Allergen Reactions  Lisinopril Cough  
  
  
Review of Systems: A comprehensive review of systems was negative except for: Musculoskeletal: positive for myalgias, arthralgias and stiff joints Neurological: positive for speech problems, coordination problems, gait problems and weakness Endocrine: positive for increased blood sugars Vitals:  
  04/19/18 1518 BP: 160/80 Pulse: 75 SpO2: 96% Weight: 334 lb (151.5 kg) Height: 6' 2\" (1.88 m)  
  
Objective:  
  
I 
  
  
NEUROLOGICAL EXAM: 
  
Appearance: The patient is well developed, well nourished, provides a poor history because of his expressive aphasia, and is in no acute distress. Mental Status: Oriented to time, place and person and the president. He has a moderate expressive aphasia, but his cognitive function and fund of knowledge seemed normal for him. Mood and affect appropriate. Cranial Nerves:   Intact visual fields. Fundi are benign. MIGEL, EOM's full, no nystagmus , no ptosis. Facial sensation is normal. Corneal reflexes are not tested. Hearing is normal bilaterally. Palate is midline with normal sternocleidomastoid and trapezius muscles are normal. Tongue is midline. He has mild right facial asymmetry, but normal facial sensation. Motor:  5/5 strength in upper and lower proximal and distal muscles on the left, and moderate severe spastic right hemiparesis on the right with greater involvement in his arm. Normal bulk and tone. No fasciculations. Reflexes:   Deep tendon reflexes 2+/4 on the right, and 1+/4 on the left. 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 Tremor:   No tremor noted. Cerebellar:  Abnormal Romberg and tandem cerebellar signs present. Neurovascular:  Normal heart sounds and regular rhythm, peripheral pulses decreased in both feet, and no carotid bruits.  
  
  
  
  
Assessment:  
  
    ICD-10-CM ICD-9-CM    
1. Stroke determined by clinical assessment ([facility] [address] I63.9 434.91 REFERRAL TO CARDIOLOGY  
      gabapentin (NEURONTIN) 300 mg capsule 2. Spastic hemiparesis affecting dominant side ([facility]) G81.10 342.11 REFERRAL TO CARDIOLOGY  
      gabapentin (NEURONTIN) 300 mg capsule 3. Cerebral infarction due to thrombosis of left middle cerebral artery ([facility]) I63.312 434.01 REFERRAL TO CARDIOLOGY  
      gabapentin (NEURONTIN) 300 mg capsule 4. Bilateral carotid artery stenosis I65.23 433.10 REFERRAL TO CARDIOLOGY  
    433.30 gabapentin (NEURONTIN) 300 mg capsule 5. Diabetic peripheral neuropathy associated with type 2 diabetes mellitus ([facility]) E11.42 250.60 REFERRAL TO CARDIOLOGY  
    357.2 gabapentin (NEURONTIN) 300 mg capsule 6. Complex partial seizures evolving to generalized tonic-clonic seizures ([facility]) G40.209 345.40 REFERRAL TO CARDIOLOGY  
      gabapentin (NEURONTIN) 300 mg capsule 7.  Expressive aphasia R47.01 784.3 REFERRAL TO CARDIOLOGY  
      gabapentin (NEURONTIN) 300 mg capsule  
  
  
  
Plan:  
  
 Patient to try Neurontin for this neuropathic type pain in his hands, and if that persists he will get an EMG study scheduled, to rule out carpal tunnel syndrome in addition to his diabetic neuropathy Hopefully the Neurontin will help his neuropathy too For his chest palpitations and pain he will be referred to cardiology at [facility] 
Patient to remained off [address] and go to [facility] if he remains seizure-free for 's evaluation Patient is to continue Plavix in view of his recent stroke He will return in 12 months time for follow-up and possibly a carotid Doppler Patient advised with stroke instructions on how to recognize a stroke in the symptoms of a stroke, nature he controls his blood pressure, diabetes, and cholesterol, and stay off cigarettes, and exercises regularly and remained mentally and physically active and take his medications as prescribed We will see him again in 12 months time or earlier if needed, and he is to finish his therapy 
  
Signed By: Vidya Hinojosa MD   
  April 19, 2018 Print             Continue Patient Registration Yenny 9 Emergency Contact Information Patient ID: [id number] Last Name: Lit Llanes Name: Jerry Millan First Name: Rasheeda Burroughs Phone: [phone] Middle Name: Employer Information Sex: M YOB: 1960 Name: 
Social Security No.: [id number] Phone: 
Address: [address] [place] [address] Guarantor Information (to whom statements are sent) Zip: [address] Name: Dave Adair City: [place] State: [address] [place] [address] Address: Marium Bernardo [address] [place] [address] Phone: [phone] Phone: ( ) _______ - ______________ Work Phone: Other: 
Mobile Phone: [phone] Patient Referred by: ______________________ Marital Status:  Patient PCP: ________________________ Primary Insurance Information Insurance Plan Name: [employer] (Medicare Replacement/Advantage - PPO) Address to [address] [facility] Insurance Phone Number: [phone] [place], [facility] Policy Information Policy Rodriguez Patient's relationship to policy rodriguez: Self Last Norma Vega 
ID/Certification No.: [id number] First Name:ETHEL Policy/Group No.: Middle Name: A Issue Date: Address:[address] [place] [address] Exp Date: [facility]:[place] State:[place] Zip:[address] Copay Amount: ___________________ Social Sec Number: [id number] Co-insurance Percent:__________________________________ YOB: 1960 Sex: ______________ Employer: 
 
Secondary Insurance Information Insurance Plan Name: Address to Send Claims: 
Insurance Phone Number: , 
[address] Patient's relationship to policy rodriguez: Last Name: 
ID/Certification No.: First Name: 
Policy/Group No.: Middle Name: 
Issue Date: Address: 
Exp Date: City: State: Zip: 
Copay Amount: ______________________ Social Sec Number: Co-insurance Percent:__________________________________ Date of Birth: Sex: ____________ Employer: 
ASSIGNMENT AND RELEASE: 
I hereby assign my insurance benefits to be paid directly to the physician. I understand that I am financially responsible for all non-covered services. I authorize the physician to release any information required to process this claim. Signed_______________________________________________________________________________________________ Date:_________________________ I appreciate your assistance in Mr. [place]  and look forward to your findings and recommendations. Sincerely, Kishor Araujo MD

## 2018-11-15 DIAGNOSIS — E11.42 TYPE 2 DIABETES MELLITUS WITH DIABETIC POLYNEUROPATHY, WITH LONG-TERM CURRENT USE OF INSULIN (HCC): ICD-10-CM

## 2018-11-15 DIAGNOSIS — Z79.4 TYPE 2 DIABETES MELLITUS WITH DIABETIC POLYNEUROPATHY, WITH LONG-TERM CURRENT USE OF INSULIN (HCC): ICD-10-CM

## 2021-05-23 ENCOUNTER — HOSPITAL ENCOUNTER (INPATIENT)
Age: 61
LOS: 7 days | Discharge: NURSING, REHAB OR LONG-TERM CARE | DRG: 432 | End: 2021-05-30
Attending: INTERNAL MEDICINE | Admitting: STUDENT IN AN ORGANIZED HEALTH CARE EDUCATION/TRAINING PROGRAM
Payer: MEDICARE

## 2021-05-23 ENCOUNTER — APPOINTMENT (OUTPATIENT)
Dept: CT IMAGING | Age: 61
DRG: 432 | End: 2021-05-23
Attending: STUDENT IN AN ORGANIZED HEALTH CARE EDUCATION/TRAINING PROGRAM
Payer: MEDICARE

## 2021-05-23 DIAGNOSIS — R47.01 EXPRESSIVE APHASIA: ICD-10-CM

## 2021-05-23 DIAGNOSIS — G40.209 COMPLEX PARTIAL SEIZURES EVOLVING TO GENERALIZED TONIC-CLONIC SEIZURES (HCC): ICD-10-CM

## 2021-05-23 DIAGNOSIS — E11.42 DIABETIC PERIPHERAL NEUROPATHY ASSOCIATED WITH TYPE 2 DIABETES MELLITUS (HCC): ICD-10-CM

## 2021-05-23 DIAGNOSIS — I63.312 CEREBRAL INFARCTION DUE TO THROMBOSIS OF LEFT MIDDLE CEREBRAL ARTERY (HCC): ICD-10-CM

## 2021-05-23 DIAGNOSIS — G81.10 SPASTIC HEMIPARESIS AFFECTING DOMINANT SIDE (HCC): ICD-10-CM

## 2021-05-23 DIAGNOSIS — I65.23 BILATERAL CAROTID ARTERY STENOSIS: ICD-10-CM

## 2021-05-23 DIAGNOSIS — I63.9 STROKE DETERMINED BY CLINICAL ASSESSMENT (HCC): ICD-10-CM

## 2021-05-23 PROBLEM — K92.2 ACUTE GI BLEEDING: Status: ACTIVE | Noted: 2021-05-23

## 2021-05-23 PROBLEM — D62 ACUTE BLOOD LOSS ANEMIA: Status: ACTIVE | Noted: 2021-05-23

## 2021-05-23 LAB
GLUCOSE BLD STRIP.AUTO-MCNC: 178 MG/DL (ref 65–117)
SERVICE CMNT-IMP: ABNORMAL

## 2021-05-23 PROCEDURE — 82962 GLUCOSE BLOOD TEST: CPT

## 2021-05-23 PROCEDURE — 65660000000 HC RM CCU STEPDOWN

## 2021-05-23 RX ORDER — MELATONIN
1000 DAILY
Status: DISCONTINUED | OUTPATIENT
Start: 2021-05-24 | End: 2021-05-30 | Stop reason: HOSPADM

## 2021-05-23 RX ORDER — MAGNESIUM SULFATE 100 %
4 CRYSTALS MISCELLANEOUS AS NEEDED
Status: CANCELLED | OUTPATIENT
Start: 2021-05-23

## 2021-05-23 RX ORDER — LORAZEPAM 1 MG/1
2 TABLET ORAL
Status: DISCONTINUED | OUTPATIENT
Start: 2021-05-23 | End: 2021-05-30 | Stop reason: HOSPADM

## 2021-05-23 RX ORDER — POLYETHYLENE GLYCOL 3350 17 G/17G
17 POWDER, FOR SOLUTION ORAL DAILY PRN
Status: DISCONTINUED | OUTPATIENT
Start: 2021-05-23 | End: 2021-05-30 | Stop reason: HOSPADM

## 2021-05-23 RX ORDER — DEXTROSE 50 % IN WATER (D50W) INTRAVENOUS SYRINGE
12.5-25 AS NEEDED
Status: CANCELLED | OUTPATIENT
Start: 2021-05-23

## 2021-05-23 RX ORDER — ALBUMIN HUMAN 250 G/1000ML
25 SOLUTION INTRAVENOUS EVERY 6 HOURS
Status: DISCONTINUED | OUTPATIENT
Start: 2021-05-24 | End: 2021-05-24

## 2021-05-23 RX ORDER — SODIUM CHLORIDE 9 MG/ML
50 INJECTION, SOLUTION INTRAVENOUS CONTINUOUS
Status: DISCONTINUED | OUTPATIENT
Start: 2021-05-23 | End: 2021-05-27

## 2021-05-23 RX ORDER — ACETAMINOPHEN 650 MG/1
650 SUPPOSITORY RECTAL
Status: DISCONTINUED | OUTPATIENT
Start: 2021-05-23 | End: 2021-05-30 | Stop reason: HOSPADM

## 2021-05-23 RX ORDER — ATORVASTATIN CALCIUM 10 MG/1
10 TABLET, FILM COATED ORAL
Status: DISCONTINUED | OUTPATIENT
Start: 2021-05-23 | End: 2021-05-30 | Stop reason: HOSPADM

## 2021-05-23 RX ORDER — SODIUM CHLORIDE 0.9 % (FLUSH) 0.9 %
5-40 SYRINGE (ML) INJECTION EVERY 8 HOURS
Status: DISCONTINUED | OUTPATIENT
Start: 2021-05-23 | End: 2021-05-24 | Stop reason: SDUPTHER

## 2021-05-23 RX ORDER — DEXTROSE 50 % IN WATER (D50W) INTRAVENOUS SYRINGE
12.5-25 AS NEEDED
Status: DISCONTINUED | OUTPATIENT
Start: 2021-05-23 | End: 2021-05-30 | Stop reason: HOSPADM

## 2021-05-23 RX ORDER — INSULIN GLARGINE 100 [IU]/ML
67 INJECTION, SOLUTION SUBCUTANEOUS DAILY
Status: DISCONTINUED | OUTPATIENT
Start: 2021-05-24 | End: 2021-05-24

## 2021-05-23 RX ORDER — LOSARTAN POTASSIUM 100 MG/1
100 TABLET ORAL DAILY
Status: DISCONTINUED | OUTPATIENT
Start: 2021-05-24 | End: 2021-05-24

## 2021-05-23 RX ORDER — ONDANSETRON 4 MG/1
4 TABLET, ORALLY DISINTEGRATING ORAL
Status: DISCONTINUED | OUTPATIENT
Start: 2021-05-23 | End: 2021-05-30 | Stop reason: HOSPADM

## 2021-05-23 RX ORDER — INSULIN LISPRO 100 [IU]/ML
INJECTION, SOLUTION INTRAVENOUS; SUBCUTANEOUS
Status: CANCELLED | OUTPATIENT
Start: 2021-05-23

## 2021-05-23 RX ORDER — METOPROLOL TARTRATE 50 MG/1
50 TABLET ORAL 2 TIMES DAILY
Status: DISCONTINUED | OUTPATIENT
Start: 2021-05-24 | End: 2021-05-24

## 2021-05-23 RX ORDER — MAGNESIUM SULFATE 100 %
4 CRYSTALS MISCELLANEOUS AS NEEDED
Status: DISCONTINUED | OUTPATIENT
Start: 2021-05-23 | End: 2021-05-30 | Stop reason: HOSPADM

## 2021-05-23 RX ORDER — ACETAMINOPHEN 325 MG/1
650 TABLET ORAL
Status: DISCONTINUED | OUTPATIENT
Start: 2021-05-23 | End: 2021-05-30 | Stop reason: HOSPADM

## 2021-05-23 RX ORDER — INSULIN LISPRO 100 [IU]/ML
12 INJECTION, SOLUTION INTRAVENOUS; SUBCUTANEOUS
Status: DISCONTINUED | OUTPATIENT
Start: 2021-05-24 | End: 2021-05-24

## 2021-05-23 RX ORDER — INSULIN LISPRO 100 [IU]/ML
INJECTION, SOLUTION INTRAVENOUS; SUBCUTANEOUS
Status: DISCONTINUED | OUTPATIENT
Start: 2021-05-23 | End: 2021-05-30 | Stop reason: HOSPADM

## 2021-05-23 RX ORDER — HYDROCHLOROTHIAZIDE 25 MG/1
12.5 TABLET ORAL DAILY
Status: DISCONTINUED | OUTPATIENT
Start: 2021-05-24 | End: 2021-05-30 | Stop reason: HOSPADM

## 2021-05-23 RX ORDER — INSULIN GLARGINE 100 [IU]/ML
72 INJECTION, SOLUTION SUBCUTANEOUS DAILY
Status: CANCELLED | OUTPATIENT
Start: 2021-05-24

## 2021-05-23 RX ORDER — SODIUM CHLORIDE 0.9 % (FLUSH) 0.9 %
5-40 SYRINGE (ML) INJECTION AS NEEDED
Status: DISCONTINUED | OUTPATIENT
Start: 2021-05-23 | End: 2021-05-24 | Stop reason: SDUPTHER

## 2021-05-23 RX ORDER — GABAPENTIN 300 MG/1
300 CAPSULE ORAL 3 TIMES DAILY
Status: DISCONTINUED | OUTPATIENT
Start: 2021-05-23 | End: 2021-05-30 | Stop reason: HOSPADM

## 2021-05-23 RX ORDER — AMLODIPINE BESYLATE 5 MG/1
10 TABLET ORAL DAILY
Status: DISCONTINUED | OUTPATIENT
Start: 2021-05-24 | End: 2021-05-30 | Stop reason: HOSPADM

## 2021-05-23 RX ORDER — ONDANSETRON 2 MG/ML
4 INJECTION INTRAMUSCULAR; INTRAVENOUS
Status: DISCONTINUED | OUTPATIENT
Start: 2021-05-23 | End: 2021-05-30 | Stop reason: HOSPADM

## 2021-05-23 NOTE — PROGRESS NOTES
9255 TRANSFER - IN REPORT:    Verbal report received from [facility] on Alissa Mcfarland  [facility]      Report consisted of patients Situation, Background, Assessment and   Recommendations(SBAR). Information from the following report(s) SBAR and Kardex was reviewed with the receiving nurse. Opportunity for questions and clarification was provided. Assessment completed upon patients arrival to unit and care assumed. 1915 Pt arrived via stretcher with [facility], oriented to new room and call system. End of Shift Note    Bedside shift change report given to Adynxx (oncoming nurse) by Tamela Beltrán RN (offgoing nurse). Report included the following information SBAR, Kardex and MAR    Shift worked:  7-7     Shift summary and any significant changes:     new admission     Concerns for physician to address:  new admission     [facility]   [phone]       Activity:     Number times ambulated in hallways past shift: 0  Number of times OOB to chair past shift: 0    Cardiac:   Cardiac Monitoring: No           Access:   Current line(s): PIV     Genitourinary:   Urinary status: voiding    Respiratory:   O2 Device: None (Room air)  Chronic home O2 use?: NO  Incentive spirometer at bedside: N/A     GI:     Current diet:  No diet orders on file  Passing flatus: YES  Tolerating current diet: YES       Pain Management:   Patient states pain is manageable on current regimen: NO    Skin:     Interventions: speciality bed    Patient Safety:  Fall Score:  Total Score: 3  Interventions: bed/chair alarm  High Fall Risk: Yes    Length of Stay:  Expected LOS: - - -  Actual LOS: 0      Tamela Beltrán RN

## 2021-05-24 ENCOUNTER — ANESTHESIA (OUTPATIENT)
Dept: ENDOSCOPY | Age: 61
DRG: 432 | End: 2021-05-24
Payer: MEDICARE

## 2021-05-24 ENCOUNTER — ANESTHESIA EVENT (OUTPATIENT)
Dept: ENDOSCOPY | Age: 61
DRG: 432 | End: 2021-05-24
Payer: MEDICARE

## 2021-05-24 ENCOUNTER — APPOINTMENT (OUTPATIENT)
Dept: CT IMAGING | Age: 61
DRG: 432 | End: 2021-05-24
Attending: STUDENT IN AN ORGANIZED HEALTH CARE EDUCATION/TRAINING PROGRAM
Payer: MEDICARE

## 2021-05-24 LAB
ALBUMIN SERPL-MCNC: 2.1 G/DL (ref 3.5–5)
ALBUMIN/GLOB SERPL: 0.5 {RATIO} (ref 1.1–2.2)
ALP SERPL-CCNC: 206 U/L (ref 45–117)
ALT SERPL-CCNC: 46 U/L (ref 12–78)
ANION GAP SERPL CALC-SCNC: 10 MMOL/L (ref 5–15)
AST SERPL-CCNC: 50 U/L (ref 15–37)
BASOPHILS # BLD: 0 K/UL (ref 0–0.1)
BASOPHILS NFR BLD: 0 % (ref 0–1)
BILIRUB SERPL-MCNC: 1 MG/DL (ref 0.2–1)
BUN SERPL-MCNC: 59 MG/DL (ref 6–20)
BUN/CREAT SERPL: 30 (ref 12–20)
CALCIUM SERPL-MCNC: 7.5 MG/DL (ref 8.5–10.1)
CHLORIDE SERPL-SCNC: 106 MMOL/L (ref 97–108)
CO2 SERPL-SCNC: 25 MMOL/L (ref 21–32)
COMMENT, HOLDF: NORMAL
COVID-19 RAPID TEST, COVR: NOT DETECTED
CREAT SERPL-MCNC: 1.95 MG/DL (ref 0.7–1.3)
DIFFERENTIAL METHOD BLD: ABNORMAL
EOSINOPHIL # BLD: 0 K/UL (ref 0–0.4)
EOSINOPHIL NFR BLD: 0 % (ref 0–7)
ERYTHROCYTE [DISTWIDTH] IN BLOOD BY AUTOMATED COUNT: 22.8 % (ref 11.5–14.5)
ERYTHROCYTE [DISTWIDTH] IN BLOOD BY AUTOMATED COUNT: 25.4 % (ref 11.5–14.5)
FERRITIN SERPL-MCNC: 21 NG/ML (ref 26–388)
FOLATE SERPL-MCNC: 18 NG/ML (ref 5–21)
GLOBULIN SER CALC-MCNC: 4.1 G/DL (ref 2–4)
GLUCOSE BLD STRIP.AUTO-MCNC: 196 MG/DL (ref 65–117)
GLUCOSE BLD STRIP.AUTO-MCNC: 206 MG/DL (ref 65–117)
GLUCOSE BLD STRIP.AUTO-MCNC: 213 MG/DL (ref 65–117)
GLUCOSE BLD STRIP.AUTO-MCNC: 220 MG/DL (ref 65–117)
GLUCOSE BLD STRIP.AUTO-MCNC: 252 MG/DL (ref 65–117)
GLUCOSE SERPL-MCNC: 158 MG/DL (ref 65–100)
HCT VFR BLD AUTO: 20.2 % (ref 36.6–50.3)
HCT VFR BLD AUTO: 21 % (ref 36.6–50.3)
HGB BLD-MCNC: 6.2 G/DL (ref 12.1–17)
HGB BLD-MCNC: 6.5 G/DL (ref 12.1–17)
HISTORY CHECKED?,CKHIST: NORMAL
HISTORY CHECKED?,CKHIST: NORMAL
IMM GRANULOCYTES # BLD AUTO: 0.2 K/UL (ref 0–0.04)
IMM GRANULOCYTES NFR BLD AUTO: 1 % (ref 0–0.5)
IRON SATN MFR SERPL: 24 % (ref 20–50)
IRON SERPL-MCNC: 71 UG/DL (ref 35–150)
LYMPHOCYTES # BLD: 2.8 K/UL (ref 0.8–3.5)
LYMPHOCYTES NFR BLD: 16 % (ref 12–49)
MAGNESIUM SERPL-MCNC: 1.5 MG/DL (ref 1.6–2.4)
MCH RBC QN AUTO: 21.8 PG (ref 26–34)
MCH RBC QN AUTO: 23.2 PG (ref 26–34)
MCHC RBC AUTO-ENTMCNC: 30.7 G/DL (ref 30–36.5)
MCHC RBC AUTO-ENTMCNC: 31 G/DL (ref 30–36.5)
MCV RBC AUTO: 71.1 FL (ref 80–99)
MCV RBC AUTO: 75 FL (ref 80–99)
MONOCYTES # BLD: 2.1 K/UL (ref 0–1)
MONOCYTES NFR BLD: 12 % (ref 5–13)
NEUTS SEG # BLD: 12.7 K/UL (ref 1.8–8)
NEUTS SEG NFR BLD: 71 % (ref 32–75)
NRBC # BLD: 0 K/UL (ref 0–0.01)
NRBC # BLD: 0 K/UL (ref 0–0.01)
NRBC BLD-RTO: 0 PER 100 WBC
NRBC BLD-RTO: 0 PER 100 WBC
PLATELET # BLD AUTO: 244 K/UL (ref 150–400)
PLATELET # BLD AUTO: 244 K/UL (ref 150–400)
PMV BLD AUTO: 12.6 FL (ref 8.9–12.9)
POTASSIUM SERPL-SCNC: 4.2 MMOL/L (ref 3.5–5.1)
PROT SERPL-MCNC: 6.2 G/DL (ref 6.4–8.2)
RBC # BLD AUTO: 2.8 M/UL (ref 4.1–5.7)
RBC # BLD AUTO: 2.84 M/UL (ref 4.1–5.7)
RBC MORPH BLD: ABNORMAL
SAMPLES BEING HELD,HOLD: NORMAL
SERVICE CMNT-IMP: ABNORMAL
SODIUM SERPL-SCNC: 141 MMOL/L (ref 136–145)
SOURCE, COVRS: NORMAL
TIBC SERPL-MCNC: 301 UG/DL (ref 250–450)
VIT B12 SERPL-MCNC: 300 PG/ML (ref 193–986)
WBC # BLD AUTO: 17.4 K/UL (ref 4.1–11.1)
WBC # BLD AUTO: 17.8 K/UL (ref 4.1–11.1)

## 2021-05-24 PROCEDURE — 74011000258 HC RX REV CODE- 258: Performed by: INTERNAL MEDICINE

## 2021-05-24 PROCEDURE — 74011250636 HC RX REV CODE- 250/636: Performed by: NURSE PRACTITIONER

## 2021-05-24 PROCEDURE — 82607 VITAMIN B-12: CPT

## 2021-05-24 PROCEDURE — C9113 INJ PANTOPRAZOLE SODIUM, VIA: HCPCS | Performed by: STUDENT IN AN ORGANIZED HEALTH CARE EDUCATION/TRAINING PROGRAM

## 2021-05-24 PROCEDURE — 87635 SARS-COV-2 COVID-19 AMP PRB: CPT

## 2021-05-24 PROCEDURE — 77030014243 HC BND LIG VRCES BSC -D: Performed by: INTERNAL MEDICINE

## 2021-05-24 PROCEDURE — 74011000250 HC RX REV CODE- 250: Performed by: NURSE ANESTHETIST, CERTIFIED REGISTERED

## 2021-05-24 PROCEDURE — 74011636637 HC RX REV CODE- 636/637: Performed by: STUDENT IN AN ORGANIZED HEALTH CARE EDUCATION/TRAINING PROGRAM

## 2021-05-24 PROCEDURE — 86901 BLOOD TYPING SEROLOGIC RH(D): CPT

## 2021-05-24 PROCEDURE — 2709999900 HC NON-CHARGEABLE SUPPLY

## 2021-05-24 PROCEDURE — 74011000636 HC RX REV CODE- 636: Performed by: STUDENT IN AN ORGANIZED HEALTH CARE EDUCATION/TRAINING PROGRAM

## 2021-05-24 PROCEDURE — 2709999900 HC NON-CHARGEABLE SUPPLY: Performed by: INTERNAL MEDICINE

## 2021-05-24 PROCEDURE — 36430 TRANSFUSION BLD/BLD COMPNT: CPT

## 2021-05-24 PROCEDURE — 74011000636 HC RX REV CODE- 636

## 2021-05-24 PROCEDURE — 82746 ASSAY OF FOLIC ACID SERUM: CPT

## 2021-05-24 PROCEDURE — 85027 COMPLETE CBC AUTOMATED: CPT

## 2021-05-24 PROCEDURE — 85025 COMPLETE CBC W/AUTO DIFF WBC: CPT

## 2021-05-24 PROCEDURE — 74011250637 HC RX REV CODE- 250/637: Performed by: INTERNAL MEDICINE

## 2021-05-24 PROCEDURE — 74011250637 HC RX REV CODE- 250/637: Performed by: STUDENT IN AN ORGANIZED HEALTH CARE EDUCATION/TRAINING PROGRAM

## 2021-05-24 PROCEDURE — 82728 ASSAY OF FERRITIN: CPT

## 2021-05-24 PROCEDURE — 74011250636 HC RX REV CODE- 250/636: Performed by: INTERNAL MEDICINE

## 2021-05-24 PROCEDURE — P9016 RBC LEUKOCYTES REDUCED: HCPCS

## 2021-05-24 PROCEDURE — 80053 COMPREHEN METABOLIC PANEL: CPT

## 2021-05-24 PROCEDURE — 83735 ASSAY OF MAGNESIUM: CPT

## 2021-05-24 PROCEDURE — 77030039825 HC MSK NSL PAP SUPERNO2VA VYRM -B: Performed by: NURSE ANESTHETIST, CERTIFIED REGISTERED

## 2021-05-24 PROCEDURE — 74011636637 HC RX REV CODE- 636/637: Performed by: INTERNAL MEDICINE

## 2021-05-24 PROCEDURE — 94760 N-INVAS EAR/PLS OXIMETRY 1: CPT

## 2021-05-24 PROCEDURE — 74011250636 HC RX REV CODE- 250/636: Performed by: STUDENT IN AN ORGANIZED HEALTH CARE EDUCATION/TRAINING PROGRAM

## 2021-05-24 PROCEDURE — 76040000019: Performed by: INTERNAL MEDICINE

## 2021-05-24 PROCEDURE — 74178 CT ABD&PLV WO CNTR FLWD CNTR: CPT

## 2021-05-24 PROCEDURE — 76060000031 HC ANESTHESIA FIRST 0.5 HR: Performed by: INTERNAL MEDICINE

## 2021-05-24 PROCEDURE — 06L38CZ OCCLUSION OF ESOPHAGEAL VEIN WITH EXTRALUMINAL DEVICE, VIA NATURAL OR ARTIFICIAL OPENING ENDOSCOPIC: ICD-10-PCS | Performed by: INTERNAL MEDICINE

## 2021-05-24 PROCEDURE — 65660000000 HC RM CCU STEPDOWN

## 2021-05-24 PROCEDURE — 74011000250 HC RX REV CODE- 250: Performed by: STUDENT IN AN ORGANIZED HEALTH CARE EDUCATION/TRAINING PROGRAM

## 2021-05-24 PROCEDURE — 86923 COMPATIBILITY TEST ELECTRIC: CPT

## 2021-05-24 PROCEDURE — 36415 COLL VENOUS BLD VENIPUNCTURE: CPT

## 2021-05-24 PROCEDURE — 83540 ASSAY OF IRON: CPT

## 2021-05-24 PROCEDURE — 74011250636 HC RX REV CODE- 250/636: Performed by: NURSE ANESTHETIST, CERTIFIED REGISTERED

## 2021-05-24 PROCEDURE — P9047 ALBUMIN (HUMAN), 25%, 50ML: HCPCS | Performed by: STUDENT IN AN ORGANIZED HEALTH CARE EDUCATION/TRAINING PROGRAM

## 2021-05-24 PROCEDURE — 82962 GLUCOSE BLOOD TEST: CPT

## 2021-05-24 RX ORDER — SODIUM CHLORIDE 0.9 % (FLUSH) 0.9 %
5-40 SYRINGE (ML) INJECTION AS NEEDED
Status: DISCONTINUED | OUTPATIENT
Start: 2021-05-24 | End: 2021-05-30 | Stop reason: HOSPADM

## 2021-05-24 RX ORDER — ATROPINE SULFATE 0.1 MG/ML
0.5 INJECTION INTRAVENOUS
Status: DISCONTINUED | OUTPATIENT
Start: 2021-05-24 | End: 2021-05-24 | Stop reason: HOSPADM

## 2021-05-24 RX ORDER — DEXTROMETHORPHAN/PSEUDOEPHED 2.5-7.5/.8
1.2 DROPS ORAL
Status: DISCONTINUED | OUTPATIENT
Start: 2021-05-24 | End: 2021-05-24 | Stop reason: HOSPADM

## 2021-05-24 RX ORDER — SODIUM CHLORIDE 9 MG/ML
250 INJECTION, SOLUTION INTRAVENOUS AS NEEDED
Status: DISCONTINUED | OUTPATIENT
Start: 2021-05-24 | End: 2021-05-29 | Stop reason: ALTCHOICE

## 2021-05-24 RX ORDER — METOCLOPRAMIDE HYDROCHLORIDE 5 MG/ML
10 INJECTION INTRAMUSCULAR; INTRAVENOUS EVERY 6 HOURS
Status: DISCONTINUED | OUTPATIENT
Start: 2021-05-24 | End: 2021-05-24

## 2021-05-24 RX ORDER — INSULIN LISPRO 100 [IU]/ML
4 INJECTION, SOLUTION INTRAVENOUS; SUBCUTANEOUS
Status: DISCONTINUED | OUTPATIENT
Start: 2021-05-25 | End: 2021-05-30 | Stop reason: HOSPADM

## 2021-05-24 RX ORDER — FLUMAZENIL 0.1 MG/ML
0.2 INJECTION INTRAVENOUS
Status: DISCONTINUED | OUTPATIENT
Start: 2021-05-24 | End: 2021-05-24 | Stop reason: HOSPADM

## 2021-05-24 RX ORDER — INSULIN GLARGINE 100 [IU]/ML
67 INJECTION, SOLUTION SUBCUTANEOUS DAILY
Status: DISCONTINUED | OUTPATIENT
Start: 2021-05-25 | End: 2021-05-30 | Stop reason: HOSPADM

## 2021-05-24 RX ORDER — METOCLOPRAMIDE HYDROCHLORIDE 5 MG/ML
10 INJECTION INTRAMUSCULAR; INTRAVENOUS
Status: COMPLETED | OUTPATIENT
Start: 2021-05-24 | End: 2021-05-24

## 2021-05-24 RX ORDER — LIDOCAINE HYDROCHLORIDE 20 MG/ML
INJECTION, SOLUTION EPIDURAL; INFILTRATION; INTRACAUDAL; PERINEURAL AS NEEDED
Status: DISCONTINUED | OUTPATIENT
Start: 2021-05-24 | End: 2021-05-24 | Stop reason: HOSPADM

## 2021-05-24 RX ORDER — METOPROLOL TARTRATE 25 MG/1
25 TABLET, FILM COATED ORAL 2 TIMES DAILY
Status: DISCONTINUED | OUTPATIENT
Start: 2021-05-24 | End: 2021-05-26

## 2021-05-24 RX ORDER — ALBUMIN HUMAN 250 G/1000ML
25 SOLUTION INTRAVENOUS ONCE
Status: COMPLETED | OUTPATIENT
Start: 2021-05-24 | End: 2021-05-24

## 2021-05-24 RX ORDER — LOSARTAN POTASSIUM 25 MG/1
25 TABLET ORAL DAILY
Status: DISCONTINUED | OUTPATIENT
Start: 2021-05-25 | End: 2021-05-24

## 2021-05-24 RX ORDER — EPINEPHRINE 0.1 MG/ML
1 INJECTION INTRACARDIAC; INTRAVENOUS
Status: DISCONTINUED | OUTPATIENT
Start: 2021-05-24 | End: 2021-05-24 | Stop reason: HOSPADM

## 2021-05-24 RX ORDER — NALOXONE HYDROCHLORIDE 0.4 MG/ML
0.4 INJECTION, SOLUTION INTRAMUSCULAR; INTRAVENOUS; SUBCUTANEOUS
Status: DISCONTINUED | OUTPATIENT
Start: 2021-05-24 | End: 2021-05-24 | Stop reason: HOSPADM

## 2021-05-24 RX ORDER — SODIUM CHLORIDE 0.9 % (FLUSH) 0.9 %
5-40 SYRINGE (ML) INJECTION EVERY 8 HOURS
Status: DISCONTINUED | OUTPATIENT
Start: 2021-05-24 | End: 2021-05-30 | Stop reason: HOSPADM

## 2021-05-24 RX ORDER — PROPOFOL 10 MG/ML
INJECTION, EMULSION INTRAVENOUS AS NEEDED
Status: DISCONTINUED | OUTPATIENT
Start: 2021-05-24 | End: 2021-05-24 | Stop reason: HOSPADM

## 2021-05-24 RX ORDER — OCTREOTIDE ACETATE 100 UG/ML
50 INJECTION, SOLUTION INTRAVENOUS; SUBCUTANEOUS
Status: COMPLETED | OUTPATIENT
Start: 2021-05-24 | End: 2021-05-24

## 2021-05-24 RX ORDER — ASPIRIN 325 MG/1
100 TABLET, FILM COATED ORAL DAILY
Status: DISCONTINUED | OUTPATIENT
Start: 2021-05-27 | End: 2021-05-30 | Stop reason: HOSPADM

## 2021-05-24 RX ORDER — SODIUM CHLORIDE 9 MG/ML
75 INJECTION, SOLUTION INTRAVENOUS CONTINUOUS
Status: DISCONTINUED | OUTPATIENT
Start: 2021-05-24 | End: 2021-05-24 | Stop reason: HOSPADM

## 2021-05-24 RX ORDER — SODIUM CHLORIDE 9 MG/ML
250 INJECTION, SOLUTION INTRAVENOUS AS NEEDED
Status: DISCONTINUED | OUTPATIENT
Start: 2021-05-24 | End: 2021-05-30

## 2021-05-24 RX ADMIN — SODIUM CHLORIDE 500 ML: 9 INJECTION, SOLUTION INTRAVENOUS at 00:12

## 2021-05-24 RX ADMIN — GABAPENTIN 300 MG: 300 CAPSULE ORAL at 21:24

## 2021-05-24 RX ADMIN — CEFTRIAXONE 1 G: 1 INJECTION, POWDER, FOR SOLUTION INTRAMUSCULAR; INTRAVENOUS at 18:02

## 2021-05-24 RX ADMIN — INSULIN LISPRO 3 UNITS: 100 INJECTION, SOLUTION INTRAVENOUS; SUBCUTANEOUS at 18:03

## 2021-05-24 RX ADMIN — Medication 10 ML: at 21:24

## 2021-05-24 RX ADMIN — SODIUM CHLORIDE 75 ML/HR: 900 INJECTION, SOLUTION INTRAVENOUS at 12:25

## 2021-05-24 RX ADMIN — IOPAMIDOL 100 ML: 755 INJECTION, SOLUTION INTRAVENOUS at 09:10

## 2021-05-24 RX ADMIN — ALBUMIN (HUMAN) 25 G: 0.25 INJECTION, SOLUTION INTRAVENOUS at 02:42

## 2021-05-24 RX ADMIN — GABAPENTIN 300 MG: 300 CAPSULE ORAL at 18:03

## 2021-05-24 RX ADMIN — SODIUM CHLORIDE 40 MG: 9 INJECTION, SOLUTION INTRAMUSCULAR; INTRAVENOUS; SUBCUTANEOUS at 00:11

## 2021-05-24 RX ADMIN — INSULIN LISPRO 2 UNITS: 100 INJECTION, SOLUTION INTRAVENOUS; SUBCUTANEOUS at 21:23

## 2021-05-24 RX ADMIN — SODIUM CHLORIDE 40 MG: 9 INJECTION, SOLUTION INTRAMUSCULAR; INTRAVENOUS; SUBCUTANEOUS at 21:23

## 2021-05-24 RX ADMIN — Medication 10 ML: at 00:13

## 2021-05-24 RX ADMIN — Medication 10 ML: at 14:17

## 2021-05-24 RX ADMIN — Medication 10 ML: at 14:16

## 2021-05-24 RX ADMIN — PROPOFOL 100 MG: 10 INJECTION, EMULSION INTRAVENOUS at 12:36

## 2021-05-24 RX ADMIN — ATORVASTATIN CALCIUM 10 MG: 10 TABLET, FILM COATED ORAL at 21:24

## 2021-05-24 RX ADMIN — PROPOFOL 60 MG: 10 INJECTION, EMULSION INTRAVENOUS at 12:45

## 2021-05-24 RX ADMIN — METOCLOPRAMIDE 10 MG: 5 INJECTION, SOLUTION INTRAMUSCULAR; INTRAVENOUS at 12:04

## 2021-05-24 RX ADMIN — PROPOFOL 100 MG: 10 INJECTION, EMULSION INTRAVENOUS at 12:39

## 2021-05-24 RX ADMIN — INSULIN LISPRO 5 UNITS: 100 INJECTION, SOLUTION INTRAVENOUS; SUBCUTANEOUS at 14:29

## 2021-05-24 RX ADMIN — Medication 5 ML: at 06:36

## 2021-05-24 RX ADMIN — SODIUM CHLORIDE 40 MG: 9 INJECTION, SOLUTION INTRAMUSCULAR; INTRAVENOUS; SUBCUTANEOUS at 12:04

## 2021-05-24 RX ADMIN — INSULIN GLARGINE 67 UNITS: 100 INJECTION, SOLUTION SUBCUTANEOUS at 12:03

## 2021-05-24 RX ADMIN — SODIUM CHLORIDE 100 ML/HR: 9 INJECTION, SOLUTION INTRAVENOUS at 01:03

## 2021-05-24 RX ADMIN — ALBUMIN (HUMAN) 25 G: 0.25 INJECTION, SOLUTION INTRAVENOUS at 14:11

## 2021-05-24 RX ADMIN — LIDOCAINE HYDROCHLORIDE 100 MG: 20 INJECTION, SOLUTION EPIDURAL; INFILTRATION; INTRACAUDAL; PERINEURAL at 12:36

## 2021-05-24 RX ADMIN — IOPAMIDOL 100 ML: 755 INJECTION, SOLUTION INTRAVENOUS at 09:22

## 2021-05-24 RX ADMIN — SODIUM CHLORIDE 50 MCG/HR: 9 INJECTION, SOLUTION INTRAVENOUS at 18:02

## 2021-05-24 RX ADMIN — OCTREOTIDE ACETATE 50 MCG: 100 INJECTION, SOLUTION INTRAVENOUS; SUBCUTANEOUS at 18:07

## 2021-05-24 RX ADMIN — THIAMINE HYDROCHLORIDE 100 MG: 100 INJECTION, SOLUTION INTRAMUSCULAR; INTRAVENOUS at 21:25

## 2021-05-24 NOTE — PROGRESS NOTES
TRANSFER - IN REPORT:    Verbal report received from [facility] on Megha Tracee  being received from 2105(unit) for ordered procedure      Report consisted of patients Situation, Background, Assessment and   Recommendations(SBAR). Information from the following report(s) SBAR was reviewed with the receiving nurse. Opportunity for questions and clarification was provided.

## 2021-05-24 NOTE — PROGRESS NOTES
.. TRANSFER - OUT REPORT:    Verbal report given to Danielle(name) on Ro Caballero  being transferred to 2105(unit) for routine progression of care       Report consisted of patients Situation, Background, Assessment and   Recommendations(SBAR). Information from the following report(s) Procedure Summary, MAR and Accordion was reviewed with the receiving nurse. Lines:   Peripheral IV 05/23/21 Posterior;Right Forearm (Active)   Site Assessment Clean, dry, & intact 05/24/21 0759   Phlebitis Assessment 0 05/24/21 0759   Infiltration Assessment 0 05/24/21 0759   Dressing Status Clean, dry, & intact 05/24/21 0759   Dressing Type Transparent;Tape 05/24/21 0759   Hub Color/Line Status Pink 05/24/21 0759   Action Taken Open ports on tubing capped 05/23/21 2100   Alcohol Cap Used Yes 05/23/21 2100       Peripheral IV 05/24/21 Left Antecubital (Active)        Opportunity for questions and clarification was provided.       Patient transported with:   Registered Nurse

## 2021-05-24 NOTE — PROGRESS NOTES
Hospitalist Progress Note    NAME: Rebekah Slaughter   :  1960   MRN:  [id number]     Admit date: 2021    Today's date: 21    PCP: Whit Simpson MD      Assessment / Plan:     Acute kidney injury POA acute BUN/creat 59/1.95  - Baseline creatinine unclear, was 1.36 several days ago  - Suspect hypovolemia with the bleeding  - IVF  - Albumin 25 g x 2 doses  - No hydronephrosis  - Serial labs    Acute blood less anemia POA HgB 15.5 to 6.2  Esophageal varices POA  Upper GI bleed POA  Alcohol-induced cirrhosis POA  Alcohol abuse POA  - Bloody stools, now resolved  - HgB at PCPs office was low 6.2,  sent to ED  -CT abdomen/pelvis with nodular liver, suggestive of cirrhosis        No bleeding source  -EGD today with Dr Alea Lewis       Large varices with red shalini stigmata and 'white nipple'                 Suggests very recent active bleeding       6 bands were placed  -I ordered additional pRBCs as hgb still < 7 after 3 units  - octreotide gtt  -IV Protonix twice daily  - IV ceftriaxone for prophylaxis BID  -CIWA protocol  -Hold the Plavix and the aspirin for 7-10 days          Patient taken this because of the CVA with right-sided deficit  - BP borderline, reduce BP meds(see below), still passing red stools, transfer to stepdown for observation    Uncontrolled diabetes type 2 with neuropathy on chronic insulin POA  -Continue NovoLog 70/30 mix 60 units twice daily at home  - Transitioned to lantus and humalog  - Now on clears, reduce humalog from 12 to 4 units  - SSI  - HgBa1c in AM  - continue neurontin    History of CVA with right residual deficit  Hyperlipidemia  Hypertension  -holding the aspirin and Plavix with bleeding  - BP borderline with severe blood loss      Stop cozaar, reduce lopressor from 50 to 25 mg  - PRN hydralazine  - Will follow BP and adjust meds as needed     Obesity POA Body mass index is 44.16 kg/m².     Code Status: Full code  Surrogate Decision Maker: Wife     DVT Prophylaxis: SCD  GI Prophylaxis: not indicated     Baseline: Ambulatory at home with assistance       Subjective:     Chief Complaint / Reason for Physician Visit f/u Gi bleed  \"Okay\". Discussed with RN events overnight. Going down for EGD, no further BMs, rectal bleeding or N/V  Received 3rd unit pRBCs this AM  EGD with varices that were banded    Review of Systems:  Symptom Y/N Comments  Symptom Y/N Comments   Fever/Chills n   Chest Pain n    Poor Appetite    Edema     Cough n   Abdominal Pain n    Sputum    Joint Pain     SOB/SHEPHERD n   Headache     Nausea/vomit n   Tolerating PT/OT     Diarrhea n   Tolerating Diet y    Constipation    Other       Could NOT obtain due to:      Objective:     VITALS:   Last 24hrs VS reviewed since prior progress note.  Most recent are:  Patient Vitals for the past 24 hrs:   Temp Pulse Resp BP SpO2   05/24/21 0259 97.3 °F (36.3 °C) 92 18 121/64 99 %   05/23/21 2203 97.8 °F (36.6 °C) 84 18 (!) 110/53 100 %   05/23/21 2054 98.3 °F (36.8 °C) 86 18 117/68 100 %   05/23/21 2037 98.5 °F (36.9 °C) 84 18 (!) 127/54 100 %   05/23/21 1938 97.7 °F (36.5 °C) 84 18 105/70 100 %   05/23/21 1924 98.9 °F (37.2 °C) 87 19 (!) 117/58 100 %       Intake/Output Summary (Last 24 hours) at 5/24/2021 0735  Last data filed at 5/23/2021 2054  Gross per 24 hour   Intake 350 ml   Output --   Net 350 ml        Wt Readings from Last 12 Encounters:   05/23/21 156 kg (343 lb 14.7 oz)   04/19/18 151.5 kg (334 lb)   09/25/17 (!) 158.8 kg (350 lb 3.2 oz)   03/27/17 (!) 163 kg (359 lb 4.8 oz)   11/28/16 (!) 167.1 kg (368 lb 6.4 oz)   06/28/16 (!) 167.4 kg (369 lb)   03/28/16 (!) 167.5 kg (369 lb 3.2 oz)   12/29/15 (!) 167.9 kg (370 lb 3.2 oz)   10/19/15 (!) 164.7 kg (363 lb)   08/27/15 (!) 160.4 kg (353 lb 9.6 oz)   05/29/15 156.5 kg (345 lb)   05/20/15 154.2 kg (340 lb)       PHYSICAL EXAM:    I had a face to face encounter and independently examined this patient on 05/24/21 as outlined below:    General: WD, WN. Alert, cooperative, no acute distress    EENT:  PERRL. Anicteric sclerae. Pale MM  Resp:  CTA bilaterally, no wheezing or rales. No accessory muscle use  CV:  Regular  rhythm,  No edema  GI:  Soft, Non distended, Non tender. +Bowel sounds, no rebound  LN:  No cervical or inguinal DANA  Neurologic:  Alert and oriented X 3, normal speech, non focal motor exam  Psych:   Good insight. Not anxious nor agitated  Skin:  No rashes. No jaundice    Reviewed most current lab test results and cultures  YES  Reviewed most current radiology test results   YES  Review and summation of old records today    NO  Reviewed patient's current orders and MAR    YES  PMH/SH reviewed - no change compared to H&P  ________________________________________________________________________  Care Plan discussed with:    Comments   Patient x    Family      RN x    Care Manager     Consultant                        Multidiciplinary team rounds were held today with , nursing, pharmacist and clinical coordinator. Patient's plan of care was discussed; medications were reviewed and discharge planning was addressed. ________________________________________________________________________      Comments   >50% of visit spent in counseling and coordination of care     ________________________________________________________________________  Kasandra Manley MD     Procedures: see electronic medical records for all procedures/Xrays and details which were not copied into this note but were reviewed prior to creation of Plan. LABS:  I reviewed today's most current labs and imaging studies.   Pertinent labs include:  Recent Labs     05/24/21  0056   WBC 17.8*   HGB 6.2*   HCT 20.2*        Recent Labs     05/24/21  0056      K 4.2      CO2 25   *   BUN 59*   CREA 1.95*   CA 7.5*   MG 1.5*   ALB 2.1*   TBILI 1.0   ALT 46

## 2021-05-24 NOTE — ROUTINE PROCESS
Hollie Paiz  1960  [id number]    Situation:  Verbal report received from: [facility] RN  Procedure: Procedure(s):  ESOPHAGOGASTRODUODENOSCOPY (EGD)  ENDOSCOPIC BANDING OR LIGATION    Background:    Preoperative diagnosis: melena  Postoperative diagnosis: esophageal varicies    :  Dr. Inder Mari  Assistant(s): Endoscopy Technician-1: Mckenzie Madrid  Endoscopy RN-1: Orlando Dickson RN    Specimens: * No specimens in log *  H. Pylori  no    Assessment:  Intra-procedure medications   Anesthesia gave intra-procedure sedation and medications, see anesthesia flow sheet yes    Intravenous fluids: NS@ KVO     Vital signs stable     Abdominal assessment: round and soft     Recommendation:  Discharge patient per MD order.   Return to [address] to share finding with family or friend

## 2021-05-24 NOTE — PROGRESS NOTES
Received message from patient's nurse Stephanie Read stating :    Patient's labs came back and his hgb is 6.2     Discussion / orders:    Transfuse 1 unit packed red blood cell           Please note that this note was dictated using Dragon computer voice recognition software. Quite often unanticipated grammatical, syntax, homophones, and other interpretive errors are inadvertently transcribed by the computer software. Please disregard these errors. Please excuse any errors that have escaped final proofreading.

## 2021-05-24 NOTE — H&P
Hospitalist Admission Note    NAME: Megha Easley   :  1960   MRN:  [id number]     Date/Time:  2021 10:23 PM    Patient PCP: Caden Jones MD  ______________________________________________________________________  Given the patient's current clinical presentation, I have a high level of concern for decompensation if discharged from the emergency department. Complex decision making was performed, which includes reviewing the patient's available past medical records, laboratory results, and x-ray films. My assessment of this patient's clinical condition and my plan of care is as follows. Assessment / Plan:    Acute on chronic anemia  GI bleed  History of NSAID abuse  Alcohol abuse  -CT abdomen/pelvis pending.  -Hemoglobin on the last check at [facility] 6.2. Repeat check pending. Patient got 1 unit of PRBC. -Ferritin, iron profile, E43, folic acid in a.m.  -CMP pending, CBC pending  -FOBT negative at [facility].  -IV Protonix twice daily  -GI consulted.  -N.p.o.  -CIWA protocol  -Hold the Plavix and the aspirin. Patient taken this because of the CVA with right-sided deficit    Metabolic acidosis  Hyperglycemia  -Uncontrolled diabetes type 2  -As per the previous labs from the other hospital anion gap is 21 and blood sugar 273. CMP is pending to give us more insight on that. -UA pending.  -Giving 1 bolus of 500 mL NS.  -Continue NovoLog 70/30 mix 60 units twice daily, patient reportedly takes 80 to 90 units. Patient is currently n.p.o.  Sliding scale insulin. Acute kidney injury  -Creatinine 1.8 on the previous labs at our [facility] hospital.  Repeat check is pending.  -Hydrating the patient.  -Albumin 25 g x 2 doses    History of CVA with right residual deficit  Hyperlipidemia  Neuropathy  Hypertension  -Continue the home medications except the Plavix and the aspirin as explained above.     Code Status: Full code  Surrogate Decision Maker: Wife    DVT Prophylaxis: SCD  GI Prophylaxis: not indicated    Baseline: Ambulatory at home with assistance      Subjective:   CHIEF COMPLAINT: Low hemoglobin rectal bleeding    HISTORY OF PRESENT ILLNESS:     Whitney Mcfarland is a 57 y.o.  male who presents with low hemoglobin and rectal bleeding. Patient past medical history of hypertension, insulin-dependent diabetes type 2, CVA, hyperlipidemia, neuropathy, alcohol abuse. Patient stated that he has noticed some blood in the stool 3 days ago and it has cleared and currently the stool is brown in color. Patient went to see the PCP and found out that his hemoglobin is low 6.5 and so the patient was sent to the ER for further evaluation. While at the [facility] patient hemoglobin was 6.1 and the patient was transferred to [facility] [address] for higher care. Patient denies any abdominal pain, no nausea vomiting, no blood in the urine, no fever and chills, no other complaints. Patient has a history of iron deficiency but not taking any replacement. We were asked to admit for work up and evaluation of the above problems. Past Medical History:   Diagnosis Date    Diabetes [facility])     Neurological disorder         No past surgical history on file. Social History     Tobacco Use    Smoking status: Current Some Day Smoker     Packs/day: 0.25     Years: 30.00     Pack years: 7.50    Smokeless tobacco: Never Used   Substance Use Topics    Alcohol use:  Yes     Alcohol/week: 0.8 standard drinks     Types: 1 Cans of beer per week     Comment: social        Family History   Problem Relation Age of Onset    Cancer Mother         breast    Diabetes Mother     Hypertension Mother     Elevated Lipids Mother     Hypertension Father     Diabetes Father     Heart Disease Father     Elevated Lipids Father     Cancer Father         unknown    No Known Problems Sister     No Known Problems Brother     No Known Problems Sister     Arthritis-osteo Child      Allergies   Allergen Reactions    Lisinopril Cough        Prior to Admission medications    Medication Sig Start Date End Date Taking? Authorizing Provider   glucose blood VI test strips (ACCU-CHEK SMARTVIEW TEST STRIP) strip TEST 2 TO 3 TIMES DAILY 11/16/18   Albino Mosqueda MD   gabapentin (NEURONTIN) 300 mg capsule Take 1 Cap by mouth three (3) times daily. Indications: NEUROPATHIC PAIN, POSTHERPETIC NEURALGIA 4/19/18   Brit Barcenas MD   amLODIPine (NORVASC) 10 mg tablet Take 1 Tab by mouth daily. 2/21/18   Albino Mosqueda MD   losartan (COZAAR) 100 mg tablet TAKE ONE TABLET BY MOUTH ONCE DAILY 3/27/17   Albino Mosqueda MD   clopidogrel (PLAVIX) 75 mg tab Take 1 Tab by mouth daily. 3/27/17   Albino Mosqueda MD   BD INSULIN SYRINGE 1 mL 28 gauge x 1/2\" syrg Use two times per day with insulin  DX: E11.42 (patient wanted longer needle) 2/20/17   Albino Mosqueda MD   insulin syringe-needle U-100 1 mL 31 gauge x 15/64\" syrg 1 Syringe by Does Not Apply route two (2) times a day. Use to inject insulin two times per day. DX: L73.09 2/2/17   Albino Mosqueda MD   insulin NPH/insulin regular (NOVOLIN 70/30, HUMULIN 70/30) 100 unit/mL (70-30) injection Inject  units in AM and PM with meals. Dispense novolin or humulin brand or relion brand whichever is cheaper 6/28/16   Albino Mosqueda MD   atorvastatin (LIPITOR) 10 mg tablet Take 1 Tab by mouth nightly. 3/28/16   Albino Mosqueda MD   metFORMIN (GLUCOPHAGE) 1,000 mg tablet Take 1 Tab by mouth two (2) times daily (with meals). Indications: TYPE 2 DIABETES MELLITUS 5/29/15   Albino Mosqueda MD   aspirin delayed-release 81 mg tablet Take 1 Tab by mouth daily. 5/20/15   Lani Zuluaga NP   Multivit,Ca,Iron-FA-Lyco-Lut ([facility]) -650-161 mg-mcg-mcg-mcg tab Take  by mouth. Provider, Historical   Cholecalciferol, Vitamin D3, (VITAMIN D3) 1,000 unit cap Take  by mouth.     Provider, Historical   Hydrochlorothiazide 12.5 mg tablet Take 12.5 mg by mouth daily. 6/2/14   Basilia Neil MD   metoprolol (LOPRESSOR) 50 mg tablet Take 50 mg by mouth two (2) times a day. Provider, Historical       REVIEW OF SYSTEMS:     I am not able to complete the review of systems because: The patient is intubated and sedated    The patient has altered mental status due to his acute medical problems    The patient has baseline aphasia from prior stroke(s)    The patient has baseline dementia and is not reliable historian    The patient is in acute medical distress and unable to provide information           Total of 12 systems reviewed as follows:       POSITIVE= underlined text  Negative = text not underlined  General:  fever, chills, sweats, generalized weakness, weight loss/gain,      loss of appetite   Eyes:    blurred vision, eye pain, loss of vision, double vision  ENT:    rhinorrhea, pharyngitis   Respiratory:   cough, sputum production, SOB, SHEPHERD, wheezing, pleuritic pain   Cardiology:   chest pain, palpitations, orthopnea, PND, edema, syncope   Gastrointestinal:  abdominal pain , N/V, diarrhea, dysphagia, constipation, bleeding   Genitourinary:  frequency, urgency, dysuria, hematuria, incontinence   Muskuloskeletal :  arthralgia, myalgia, back pain  Hematology:  easy bruising, nose or gum bleeding, lymphadenopathy   Dermatological: rash, ulceration, pruritis, color change / jaundice  Endocrine:   hot flashes or polydipsia   Neurological:  headache, dizziness, confusion, focal weakness, paresthesia,     Speech difficulties, memory loss, gait difficulty  Psychological: Feelings of anxiety, depression, agitation    Objective:   VITALS:    Visit Vitals  BP (!) 110/53   Pulse 84   Temp 97.8 °F (36.6 °C)   Resp 18   Ht 6' 2\" (1.88 m)   Wt 156 kg (343 lb 14.7 oz)   SpO2 100%   BMI 44.16 kg/m²       PHYSICAL EXAM:    General:    Alert, cooperative, no distress, appears stated age.      HEENT: Atraumatic, anicteric sclerae, pale conjunctivae     No oral ulcers, mucosa moist, throat clear, dentition fair  Neck:  Supple, symmetrical,  thyroid: non tender  Lungs:   Clear to auscultation bilaterally. No Wheezing or Rhonchi. No rales. Chest wall:  No tenderness  No Accessory muscle use. Heart:   Regular  rhythm,  No  murmur   1 plus  Edema LE  Abdomen:   Soft, non-tender. Not distended. Bowel sounds normal  Extremities: No cyanosis. No clubbing,      Skin turgor normal, Capillary refill normal, Radial dial pulse 2+  Skin:     Not pale. Not Jaundiced  No rashes   Psych:  Good insight. Not depressed. Not anxious or agitated. Neurologic: EOMs intact. No facial asymmetry. No aphasia or slurred speech. Symmetrical strength, Sensation grossly intact. Alert and oriented X 4.     _______________________________________________________________________  Care Plan discussed with:    Comments   Patient x    Family      RN x    Care Manager                    Consultant:      _______________________________________________________________________  Expected  Disposition:   Home with Family x   HH/PT/OT/RN    SNF/LTC    LACIE    ________________________________________________________________________  TOTAL TIME:  61 Minutes    Critical Care Provided     Minutes non procedure based      Comments     Reviewed previous records   >50% of visit spent in counseling and coordination of care  Discussion with patient and/or family and questions answered       ________________________________________________________________________  Signed: Brooklynn Salinas MD    Procedures: see electronic medical records for all procedures/Xrays and details which were not copied into this note but were reviewed prior to creation of Plan. LAB DATA REVIEWED:    No results found for this or any previous visit (from the past 24 hour(s)).

## 2021-05-24 NOTE — PROGRESS NOTES
2030 - Received report from primary nurse [facility] - ASRA ORTEGA). Primary nurse notified me that this patient was a direct admit from [facility] and he arrived and she admitted him at 0. I was not on floor at this time as I was on another unit and got floated to this unit after this time. When I received report at 2030 - the nurse said that the patient came in with blood going from [facility] but blood transfusion vitals had not been placed in computer at time of report. When I looked in patient's room, blood bag was empty. Charge nurse got vitals at that time and disposed of blood products in necessary bag. No admission assessment was completed and day nurse said she did nothing with blood. *Day nurse also notified me that patient had not been seen by admitting doctor as of yet. I contacted NP (Duong) and nursing supervisor for steps so patient could be seen. 2038 - Contacted NP (Duong) - Patient is direct admit. He has been here since 7:15 pm and day nurse is just now giving me report. Can you come assess patient please? 2042 - NP's response: please call [phone] and notify admitting hospitalist. thanks (this should have been done as soon as he had arrived to floor). **Agreed with NP and stated that day nurse said she called but no one has been here to see him as of yet. I was just floated to the floor. This number kept ringing, but nobody answering phone    2125 - Called nursing supervisor and she asked that she (NP) come see the patient. Notified NP that MD just placed generic orders so nursing supervisor said NP has to see patient. 2131 - NP responded to message stating: Admitting hospitalist does the admission, if he has put orders in, then he will see the patient. I see patient listed in his folder. Not sure why nursing supervisor is telling you that. I will call her.     **Admitting hospitalist has seen patient and put further orders in computer

## 2021-05-24 NOTE — ANESTHESIA PREPROCEDURE EVALUATION
Relevant Problems   No relevant active problems       Anesthetic History   No history of anesthetic complications            Review of Systems / Medical History  Patient summary reviewed, nursing notes reviewed and pertinent labs reviewed    Pulmonary  Within defined limits                 Neuro/Psych   Within defined limits  seizures  CVA       Cardiovascular  Within defined limits  Hypertension          PAD and hyperlipidemia    Exercise tolerance: <4 METS     GI/Hepatic/Renal  Within defined limits              Endo/Other  Within defined limits  Diabetes    Morbid obesity and anemia     Other Findings   Comments: Hb 6.2 - Received one unit PRBC's  GI Bleed  Polyneuropathy           Physical Exam    Airway  Mallampati: III  TM Distance: 4 - 6 cm  Neck ROM: normal range of motion, short neck   Mouth opening: Normal     Cardiovascular  Regular rate and rhythm,  S1 and S2 normal,  no murmur, click, rub, or gallop             Dental    Dentition: Full lower dentures and Full upper dentures     Pulmonary  Breath sounds clear to auscultation               Abdominal  GI exam deferred       Other Findings            Anesthetic Plan    ASA: 3, emergent  Anesthesia type: MAC          Induction: Intravenous  Anesthetic plan and risks discussed with: Patient

## 2021-05-24 NOTE — PROGRESS NOTES
End of Shift Note    Bedside shift change report given to Becka Baxter, RNs (oncoming nurses) by Darreld Gaucher, RN (offgoing nurse). Report included the following information SBAR, Kardex, Intake/Output, MAR, Recent Results and Cardiac Rhythm NSR to Sinus Tachy    Shift worked:  1900-0730    **Floated to unit and received report at 2030, resumed care of these patient's at this time. Shift summary and any significant changes:    Patient's hgb low this morning at 6.2. Contacted NP (Duong) and ordered to transfuse red blood cells & type and screen. Patient direct admit and has not received blood with us, so no consent on file. However, per shift change report he did have blood being administered when he arrived at [facility] that was started at [facility]. Contacted NP (Duong) to discuss blood transfusion with patient and obtain consent. Consent has been obtained, witnessed, and placed in patient's chart. Type and screen sent to lab. **Notified day shift nurse of the above information. They are aware that consent is in chart and type and screen is pending. Day shift nurses will administer red blood cells to patient when blood is ready for patient. Held atorvastatin and gabapentin last night since patient is NPO. Patient scheduled for CT of abdomen today. Concerns for physician to address: At shift change and during dual skin with day nurse, we noticed that patient's eyes were twitching and rolling back some. Patient however was alert and said he did not feel his eyes doing that. Day nurse obtained vitals - vitals normal and notified doctor. Patient fine and said he did not feel the twitching. MD may need to evaluate patient. Zone phone for oncoming shift:       Activity:  Activity Level:  Up with Assistance  Number times ambulated in hallways past shift: 0  Number of times OOB to chair past shift: 0    Cardiac:   Cardiac Monitoring: Yes      Cardiac Rhythm: Sinus Tachy & Normal Sinus Rhythm     Access:   Current line(s): PIV     Genitourinary:   Urinary status: voiding    Respiratory:   O2 Device: None (Room air)  Chronic home O2 use?: NO       GI:  Last Bowel Movement Date: 05/23/21  Current diet:  DIET NPO  DIET CLEAR LIQUID  Passing flatus: YES  Tolerating current diet: YES       Pain Management:   Patient states pain is manageable on current regimen: N/A (no complaints of pain overnight)    Skin:  Power Score: 17  Interventions: float heels, increase time out of bed and PT/OT consult    Patient Safety:  Fall Score:  Total Score: 3  Interventions: assistive device (walker, cane, etc), gripper socks, pt to call before getting OOB and stay with me (per policy)  High Fall Risk: Yes    Length of Stay:  Expected LOS: - - -  Actual LOS: [address]

## 2021-05-24 NOTE — PROGRESS NOTES
46- Received report from Martha. Upon entering pts room pts eyes twitching/ fluttering. Pt with no complaints of abnormal vision still communicating and oriented stating he \"feels fine and is just tired\". Pt states he is not aware of his eyes doing this in the past.  Page to Dr. Vikki Phan to make aware. No new ordered received at this time. Vital signs stable. 0900- Dr. Vikki Phan states he was able to witness pts eyes twitching. 1200- Unit of blood just finished. Endoscopy RN here to take pt down for procedure. Offered to draw hemoglobin prior to going down but state they will draw down there if it is needed. 1445- Hemoglobin drawn upon pts arrival back to unit from Endo. Hemoglobin 6.5 after first unit of blood completed. Dr. Vikki Phan made aware. 1635- Order received to transfuse a second unit of blood. 1730- Pt has had multiple large bloody bowel movements. Dr. Vikki Phan made aware. MD at bedside rounding on pt and updated wife on pt condition. Order received to transfer pt to a higher level of care. 1845- Second unit of blood started. 1940- Report called to PCU. Pt transferred to room 2245. Pt transferred to bariatric speciality bed prior to transfer. Sent with dentures and valuables.

## 2021-05-24 NOTE — PROGRESS NOTES
TRANSFER - OUT REPORT:    Verbal report given to Carlos Mejiake (name) on Parrish Tripathi  being transferred to PCU (unit) for urgent transfer     Report consisted of patients Situation, Background, Assessment and   Recommendations(SBAR). Information from the following report(s) SBAR, Kardex, Procedure Summary, Intake/Output, MAR and Recent Results was reviewed with the receiving nurse. Lines:   Peripheral IV 05/23/21 Posterior;Right Forearm (Active)   Site Assessment Clean, dry, & intact 05/24/21 0759   Phlebitis Assessment 0 05/24/21 0759   Infiltration Assessment 0 05/24/21 0759   Dressing Status Clean, dry, & intact 05/24/21 0759   Dressing Type Transparent;Tape 05/24/21 0759   Hub Color/Line Status Pink 05/24/21 0759   Action Taken Open ports on tubing capped 05/23/21 2100   Alcohol Cap Used Yes 05/23/21 2100       Peripheral IV 05/24/21 Left Antecubital (Active)        Opportunity for questions and clarification was provided.       Patient transported with:   Monitor  Registered Nurse

## 2021-05-24 NOTE — PROGRESS NOTES
Floated to unit and received report from [facility], [address] [place] [address] at this time. Resumed care of these patients at this time.

## 2021-05-24 NOTE — PROGRESS NOTES
Problem: Falls - Risk of  Goal: *Absence of Falls  Description: Document Obey Pulido Fall Risk and appropriate interventions in the flowsheet.   Outcome: Progressing Towards Goal  Note: Fall Risk Interventions:  Mobility Interventions: Communicate number of staff needed for ambulation/transfer, Patient to call before getting OOB, PT Consult for mobility concerns, Utilize walker, cane, or other assistive device         Medication Interventions: Patient to call before getting OOB, Teach patient to arise slowly    Elimination Interventions: Call light in reach, Patient to call for help with toileting needs, Toileting schedule/hourly rounds

## 2021-05-24 NOTE — PROGRESS NOTES
0015 - Contacted NP (Purveyor) - Patient is NPO but he is saying Dr. Sushila Alvarenga said he could have ice chips. However, this is not reflected in order. Also Dr. Sushila Alvarenga ordered atorvastatin and gabapentin for me to give him tonight. Can I give these or should I hold them since the patient is NPO.    0024 - NPs response: strict NPO, Hold atorvastatin and gabapentin for now. Thanks. *Held patient's atorvastatin and gabapentin due to NPO order. 3506 - Notified NP that patient's lab results came back and he has a hgb level of 6.2    0357 - NP placed orders for transfusion of packed RBCs, allocate RBCs, type and screen and normal saline 250 mL    0522 - Asked NP if she could verify consent and talk to patient since it is his first time receiving blood from us, but not first time receiving a blood transfusion. Rosangela - NP asked: can the patient cognitively able to consent? **I responded yes    0620 - NP came to see patient and discussed blood transfusion and conset process.     12 - Patient signed consent, NP signed consent, I signed as witness and consent form placed on patient's chart      **Day shift nurses notified of above information**

## 2021-05-24 NOTE — PROGRESS NOTES
Received message from patient's nurse Valente Arceo stating :    Patient is NPO but he is saying Dr. Luis Contreras said he could have ice chips. However, this is not reflected in order. Also Dr. Lius Contreras ordered atorvastatin and gabapentin for me to give him tonight  Can I give these or should I hold them since the patient is NPO       Discussion / orders:    Informed patient's nurse. Strict NPO since after midnight and patient may have procedure in am by GI. Also informed her to hold atorvastatin and gabapentin. Please note that this note was dictated using Dragon computer voice recognition software. Quite often unanticipated grammatical, syntax, homophones, and other interpretive errors are inadvertently transcribed by the computer software. Please disregard these errors. Please excuse any errors that have escaped final proofreading.

## 2021-05-24 NOTE — PROCEDURES
NAME:  Flor Lakhani   :   1960   MRN:   [id number]     Date/Time:  2021 12:54 PM    Esophagogastroduodenoscopy (EGD) Procedure Note    Procedure: Esophagogastroduodenoscopy with variceal band ligation    Indication:  Hematemesis, Melena/hematochezia, Anemia, post-hemorrhagic  Pre-operative Diagnosis: see indication above  Post-operative Diagnosis: see findings below  :  Lakshmi Henderson MD  Referring Provider:   --Arlene Oliveira MD    Exam:  Airway: clear, no airway problems anticipated  Heart: RRR, without gallops or rubs  Lungs: clear bilaterally without wheezes, crackles, or rhonchi  Abdomen: soft, nontender, nondistended, bowel sounds present  Mental Status: awake, alert and oriented to person, place and time     Anethesia/Sedation:  MAC anesthesia Propofol  Procedure Details   After informed consent was obtained for the procedure, with all risks and benefits of procedure explained the patient was taken to the endoscopy suite and placed in the left lateral decubitus position. Following sequential administration of sedation as per above, the [id number] gastroscope was inserted into the mouth and advanced under direct vision to third portion of the duodenum. A careful inspection was made as the gastroscope was withdrawn, including a retroflexed view of the proximal stomach; findings and interventions are described below. Findings:   1. Large varices with red shalini stigmata and 'white nipple' sign suggesting very recent active bleeding. Six bands successfully placed with deflation of varices, including band placed directly over the 'white nipple'  2. Normal stomach, including retroflexion. Notably no gastric varices  3. Normal duodenal bulb and 2nd/3rd portion of the duodenum    Therapies:  1. Esophageal variceal ligation    Specimens: None    EBL:  None. Complications:   None; patient tolerated the procedure well. Impression:    1.  Large varices with red shalini stigmata and 'white nipple' sign suggesting very recent active bleeding. Six bands successfully placed with deflation of varices, including band placed directly over the 'white nipple'  2. Normal stomach, including retroflexion. Notably no gastric varices  3. Normal duodenal bulb and 2nd/3rd portion of the duodenum    Recommendations:  1. Can have CLD at 5 pm  2. Continue BID PPI  3. Octreotide ordered  4. Strict Etoh abstinence  5. Ceftriaxone 1 gm daily ordered  6. TIPS if recurrent bleeding  7.  No Plavix for at least 7 days    Discharge disposition:  Back to floor following recovery in endoscopy    Anu Menon MD

## 2021-05-24 NOTE — PROGRESS NOTES
TRANSFER - IN REPORT:    Verbal report received from Concetta Olszewski (name) on Sera Pierce  being received from Endo (unit) for ordered procedure      Report consisted of patients Situation, Background, Assessment and   Recommendations(SBAR). Information from the following report(s) SBAR, Kardex, Procedure Summary, Intake/Output, MAR and Recent Results was reviewed with the receiving nurse. Opportunity for questions and clarification was provided. Assessment completed upon patients arrival to unit and care assumed.

## 2021-05-24 NOTE — CONSULTS
GI CONSULTATION NOTE  Dhruv Newberry NP  [phone] NP in-hospital cell phone M-F until 4:30  After 5pm or on weekends, please call  for physician on call    NAME: Sera Pierce   :  1960   MRN:  [id number]   Attending:  Dr Nilda Cook   Primary GI:  Dr Fareed Choudhary  Date/Time:  2021 10:12 AM  Assessment:   Acute anemia - GI bleed - Melena x 2 today - On plavix  (last dose 21) - NSAID use  Hematemesis x1 small volume on Saturday - none today  Rule out PUD vs gastritis vs varices (less likely)  - Epigastric tenderness  - No home PPI use  - Take 5 ibuprofen daily for weeks now - last about 2 days ago  - Drinks about 3 shots of liquor daily for the last year with COVID but has drank daily for his whole life. - No nausea or vomiting  - Melena since last week - loose stools  - Hgb 6.2 at [facility] but transferred here overnight - 2 units at [facility], now getting 3rd unit here. - BUN/Cr elevated  - CT abd/pel pending    ETOH abuse - current use - last drink 21  - does not appear to be cirrhotic on exam or labs but CT pending  DMII  Metabolic acidosis  ABBEY  CVA 5 years ago - on plavix since that time    Plan:   1. EGD today with Dr Fareed Choudhary at 1200  2. Maintain NPO  3. Rapid COVID please - spoke to nursing  4. STAT CBC after completion of 3rd unit of PRBC around 1100 today  5. Reglan 10mg IV now  6. Maintain BID PPI  7. Hold Plavix - last dose 21  8. Rest per primary team.     Plan discussed with Dr Fareed Choudhary. Thank you for consultation. Subjective:     HISTORY OF PRESENT ILLNESS:     Sera Pierce is an 57 y.o. AA male who we are asked to see for complaint of GI bleed and anemia. Medical history include CVA 5 years ago on plavix (last dose 21), DMII uncontrolled, fatty liver, daily ETOH use for years but increased to 3 shots daily of liquor since COVID. Pt presented to [facility] for low hgb by PCP of 6.5, pt was then transferred here for higher level of care.  Pt reports having melena for the last few days, 2 episodes today. Reports 1 episode of small volume hematemesis on Saturday. Pt reports taking ibuprofen 5 tablets daily for weeks now. No home antacid. On plavix daily. Denies abdominal pain but did have tenderness to epigastric area during exam. No nausea today. Pt has received 2 units of PRBC at outside hospital and now receiving 3rd unit here. Hgb at midnight was still 6.2. Elevated BUN/Cr. No previous EGD. Denies previous colonoscopy or family hx of CRC - but reports negative FOBT a few weeks ago at PCP. Past Medical History:   Diagnosis Date    Diabetes [facility])     Neurological disorder       No past surgical history on file. Social History     Tobacco Use    Smoking status: Current Some Day Smoker     Packs/day: 0.25     Years: 30.00     Pack years: 7.50    Smokeless tobacco: Never Used   Substance Use Topics    Alcohol use: Yes     Alcohol/week: 0.8 standard drinks     Types: 1 Cans of beer per week     Comment: social      Family History   Problem Relation Age of Onset    Cancer Mother         breast    Diabetes Mother     Hypertension Mother     Elevated Lipids Mother     Hypertension Father     Diabetes Father     Heart Disease Father     Elevated Lipids Father     Cancer Father         unknown    No Known Problems Sister     No Known Problems Brother     No Known Problems Sister     Arthritis-osteo Child       Allergies   Allergen Reactions    Lisinopril Cough      Prior to Admission medications    Medication Sig Start Date End Date Taking? Authorizing Provider   glucose blood VI test strips (ACCU-CHEK SMARTVIEW TEST STRIP) strip TEST 2 TO 3 TIMES DAILY 11/16/18   Digna Becerra MD   gabapentin (NEURONTIN) 300 mg capsule Take 1 Cap by mouth three (3) times daily. Indications: NEUROPATHIC PAIN, POSTHERPETIC NEURALGIA 4/19/18   Suzzanne Saint, MD   amLODIPine (NORVASC) 10 mg tablet Take 1 Tab by mouth daily.  2/21/18   Digna Becerra MD losartan (COZAAR) 100 mg tablet TAKE ONE TABLET BY MOUTH ONCE DAILY 3/27/17   Albino Mosqueda MD   clopidogrel (PLAVIX) 75 mg tab Take 1 Tab by mouth daily. 3/27/17   Albino Mosqueda MD   BD INSULIN SYRINGE 1 mL 28 gauge x 1/2\" syrg Use two times per day with insulin  DX: E11.42 (patient wanted longer needle) 2/20/17   Albino Mosqueda MD   insulin syringe-needle U-100 1 mL 31 gauge x 15/64\" syrg 1 Syringe by Does Not Apply route two (2) times a day. Use to inject insulin two times per day. DX: H11.81 2/2/17   Albino Mosqueda MD   insulin NPH/insulin regular (NOVOLIN 70/30, HUMULIN 70/30) 100 unit/mL (70-30) injection Inject  units in AM and PM with meals. Dispense novolin or humulin brand or relion brand whichever is cheaper 6/28/16   Albino Mosqueda MD   atorvastatin (LIPITOR) 10 mg tablet Take 1 Tab by mouth nightly. 3/28/16   Albino Mosqueda MD   metFORMIN (GLUCOPHAGE) 1,000 mg tablet Take 1 Tab by mouth two (2) times daily (with meals). Indications: TYPE 2 DIABETES MELLITUS 5/29/15   Albino Mosqueda MD   aspirin delayed-release 81 mg tablet Take 1 Tab by mouth daily. 5/20/15   Lani Zuluaga NP   Multivit,Ca,Iron-FA-Lyco-Lut ([address] [place] [address]) -733-075 mg-mcg-mcg-mcg tab Take  by mouth. Provider, Historical   Cholecalciferol, Vitamin D3, (VITAMIN D3) 1,000 unit cap Take  by mouth. Provider, Historical   Hydrochlorothiazide 12.5 mg tablet Take 12.5 mg by mouth daily. 6/2/14   Basilia Neil MD   metoprolol (LOPRESSOR) 50 mg tablet Take 50 mg by mouth two (2) times a day.     Provider, Historical       Patient Active Problem List   Diagnosis Code    Stroke determined by clinical assessment ([facility] I63.9    Spastic hemiparesis affecting dominant side ([facility] G81.10    Expressive aphasia R47.01    Ataxia R27.0    Hyperlipidemia with target LDL less than 100 E78.5    Essential hypertension I10    Cerebral infarction due to thrombosis of left middle cerebral artery ([address] T65.322    Bilateral carotid artery stenosis I65.23    Diabetic peripheral neuropathy associated with type 2 diabetes mellitus ([facility]) E11.42    Complex partial seizures evolving to generalized tonic-clonic seizures ([address] G40.209    Mixed hyperlipidemia E78.2    Type 2 diabetes mellitus with diabetic polyneuropathy, with long-term current use of insulin ([facility]) E11.42, Z79.4    Obesity, morbid ([facility]) E66.01    Acute blood loss anemia D62    Acute GI bleeding K92.2       REVIEW OF SYSTEMS:    Constitutional: negative fever, negative chills, negative weight loss  Eyes:   negative visual changes  ENT:   negative sore throat, tongue or lip swelling   Respiratory:  negative cough  Cards:  negative for chest pain, palpitations, lower extremity edema  GI:   See HPI  :  negative for frequency, dysuria  Integument:  negative for rash and pruritus  Heme:  negative for easy bruising and gum/nose bleeding  Musculoskel: negative for myalgias,  back pain   Neuro: negative for headaches, dizziness, vertigo  Psych:  negative for feelings of anxiety, depression     Pertinent Positives: dizziness, weakness, sore joints, SHEPHERD      Objective:   VITALS:    Visit Vitals  BP (!) 122/57   Pulse (!) 103   Temp 99 °F (37.2 °C)   Resp 22   Ht 6' 2\" (1.88 m)   Wt 156 kg (343 lb 14.7 oz)   SpO2 98%   BMI 44.16 kg/m²       PHYSICAL EXAM:   General:          Alert, WD, WN, cooperative, no distress, appears stated age. Head:               Normocephalic, without obvious abnormality, atraumatic. Eyes:               Conjunctivae clear and pale, anicteric sclerae. Pupils are equal  Nose:               Nares normal.   Throat:             Lips, mucosa, and tongue normal.   Neck:               Supple, symmetrical,  no adenopathy, thyroid: non tender  Back:               Symmetric,    Lungs:             CTA bilaterally. No wheezing/rhonchi/rales. Chest wall:      No tenderness or deformity. No Accessory muscle use.   Heart:              Regular rate and rhythm,  no murmur  Abdomen:        Soft, epigastric tenderness. Not distended. Bowel sounds normal. No masses  Extremities:     Atraumatic, No cyanosis. No edema. No clubbing  Skin:                Texture, turgor normal. No rashes/lesions/jaundice  Lymph:            Cervical, supraclavicular normal.  Psych:             Good insight. Not depressed. Not anxious or agitated. Neurologic:      EOMs intact. No facial asymmetry. No aphasia or slurred speech. Normal                        strength, A/O X 3. LAB DATA REVIEWED:    Recent Results (from the past 24 hour(s))   GLUCOSE, POC    Collection Time: 05/23/21 10:42 PM   Result Value Ref Range    Glucose (POC) 178 (H) 65 - 117 mg/dL    Performed by [employer]    CBC WITH AUTOMATED DIFF    Collection Time: 05/24/21 12:56 AM   Result Value Ref Range    WBC 17.8 (H) 4.1 - 11.1 K/uL    RBC 2.84 (L) 4.10 - 5.70 M/uL    HGB 6.2 (L) 12.1 - 17.0 g/dL    HCT 20.2 (L) 36.6 - 50.3 %    MCV 71.1 (L) 80.0 - 99.0 FL    MCH 21.8 (L) 26.0 - 34.0 PG    MCHC 30.7 30.0 - 36.5 g/dL    RDW 22.8 (H) 11.5 - 14.5 %    PLATELET 738 796 - 462 K/uL    MPV 12.6 8.9 - 12.9 FL    NRBC 0.0 0  WBC    ABSOLUTE NRBC 0.00 0.00 - 0.01 K/uL    NEUTROPHILS 71 32 - 75 %    LYMPHOCYTES 16 12 - 49 %    MONOCYTES 12 5 - 13 %    EOSINOPHILS 0 0 - 7 %    BASOPHILS 0 0 - 1 %    IMMATURE GRANULOCYTES 1 (H) 0.0 - 0.5 %    ABS. NEUTROPHILS 12.7 (H) 1.8 - 8.0 K/UL    ABS. LYMPHOCYTES 2.8 0.8 - 3.5 K/UL    ABS. MONOCYTES 2.1 (H) 0.0 - 1.0 K/UL    ABS. EOSINOPHILS 0.0 0.0 - 0.4 K/UL    ABS. BASOPHILS 0.0 0.0 - 0.1 K/UL    ABS. IMM.  GRANS. 0.2 (H) 0.00 - 0.04 K/UL    DF SMEAR SCANNED      RBC COMMENTS MICROCYTOSIS  1+        RBC COMMENTS ANISOCYTOSIS  2+        RBC COMMENTS HYPOCHROMIA  1+       METABOLIC PANEL, COMPREHENSIVE    Collection Time: 05/24/21 12:56 AM   Result Value Ref Range    Sodium 141 136 - 145 mmol/L    Potassium 4.2 3.5 - 5.1 mmol/L    Chloride 106 97 - 108 mmol/L    CO2 25 21 - 32 mmol/L    Anion gap 10 5 - 15 mmol/L    Glucose 158 (H) 65 - 100 mg/dL    BUN 59 (H) 6 - 20 MG/DL    Creatinine 1.95 (H) 0.70 - 1.30 MG/DL    BUN/Creatinine ratio 30 (H) 12 - 20      GFR est AA 42 (L) >60 ml/min/1.73m2    GFR est non-AA 35 (L) >60 ml/min/1.73m2    Calcium 7.5 (L) 8.5 - 10.1 MG/DL    Bilirubin, total 1.0 0.2 - 1.0 MG/DL    ALT (SGPT) 46 12 - 78 U/L    AST (SGOT) 50 (H) 15 - 37 U/L    Alk. phosphatase 206 (H) 45 - 117 U/L    Protein, total 6.2 (L) 6.4 - 8.2 g/dL    Albumin 2.1 (L) 3.5 - 5.0 g/dL    Globulin 4.1 (H) 2.0 - 4.0 g/dL    A-G Ratio 0.5 (L) 1.1 - 2.2     IRON PROFILE    Collection Time: 05/24/21 12:56 AM   Result Value Ref Range    Iron 71 35 - 150 ug/dL    TIBC 301 250 - 450 ug/dL    Iron % saturation 24 20 - 50 %   FERRITIN    Collection Time: 05/24/21 12:56 AM   Result Value Ref Range    Ferritin 21 (L) 26 - 388 NG/ML   VITAMIN B12    Collection Time: 05/24/21 12:56 AM   Result Value Ref Range    Vitamin B12 300 193 - 986 pg/mL   FOLATE    Collection Time: 05/24/21 12:56 AM   Result Value Ref Range    Folate 18.0 5.0 - 21.0 ng/mL   MAGNESIUM    Collection Time: 05/24/21 12:56 AM   Result Value Ref Range    Magnesium 1.5 (L) 1.6 - 2.4 mg/dL   SAMPLES BEING HELD    Collection Time: 05/24/21 12:56 AM   Result Value Ref Range    SAMPLES BEING HELD  LAV     COMMENT        Add-on orders for these samples will be processed based on acceptable specimen integrity and analyte stability, which may vary by analyte. RBC, ALLOCATE    Collection Time: 05/24/21  4:00 AM   Result Value Ref Range    HISTORY CHECKED?  Historical check performed    GLUCOSE, POC    Collection Time: 05/24/21  6:29 AM   Result Value Ref Range    Glucose (POC) 206 (H) 65 - 117 mg/dL    Performed by Juancho Sweet    TYPE & SCREEN    Collection Time: 05/24/21  6:48 AM   Result Value Ref Range    Crossmatch Expiration 05/27/2021,2353     ABO/Rh(D) O POSITIVE     Antibody screen NEG     Unit number [id number]     Blood component type RC LR     Unit division 00     Status of unit ISSUED     Crossmatch result Compatible        IMAGING RESULTS:  I have personally reviewed the imaging reports    5/24/21 : CT abd pel w/wo contrast pending. Total time spent with patient: 50 minutes ________________________________________________________________________  Care Plan discussed with:  Patient y   Family     Angelic Tellez              Consultant:       CT  5/24/2021:  ________________________________________________________________________    ___________________________________________________  Consulting Provider:  Odalsy Cheney NP      5/24/2021  10:12 AM

## 2021-05-25 ENCOUNTER — APPOINTMENT (OUTPATIENT)
Dept: CT IMAGING | Age: 61
DRG: 432 | End: 2021-05-25
Attending: INTERNAL MEDICINE
Payer: MEDICARE

## 2021-05-25 LAB
ALBUMIN SERPL-MCNC: 2.6 G/DL (ref 3.5–5)
ALBUMIN/GLOB SERPL: 0.7 {RATIO} (ref 1.1–2.2)
ALP SERPL-CCNC: 184 U/L (ref 45–117)
ALT SERPL-CCNC: 45 U/L (ref 12–78)
AMMONIA PLAS-SCNC: 86 UMOL/L
ANION GAP SERPL CALC-SCNC: 6 MMOL/L (ref 5–15)
AST SERPL-CCNC: 54 U/L (ref 15–37)
BASOPHILS # BLD: 0 K/UL (ref 0–0.1)
BASOPHILS NFR BLD: 0 % (ref 0–1)
BILIRUB DIRECT SERPL-MCNC: 0.4 MG/DL (ref 0–0.2)
BILIRUB SERPL-MCNC: 0.8 MG/DL (ref 0.2–1)
BUN SERPL-MCNC: 60 MG/DL (ref 6–20)
BUN/CREAT SERPL: 34 (ref 12–20)
CALCIUM SERPL-MCNC: 7.7 MG/DL (ref 8.5–10.1)
CHLORIDE SERPL-SCNC: 110 MMOL/L (ref 97–108)
CO2 SERPL-SCNC: 28 MMOL/L (ref 21–32)
COMMENT, HOLDF: NORMAL
CREAT SERPL-MCNC: 1.74 MG/DL (ref 0.7–1.3)
DIFFERENTIAL METHOD BLD: ABNORMAL
EOSINOPHIL # BLD: 0.1 K/UL (ref 0–0.4)
EOSINOPHIL NFR BLD: 1 % (ref 0–7)
ERYTHROCYTE [DISTWIDTH] IN BLOOD BY AUTOMATED COUNT: 24.8 % (ref 11.5–14.5)
GLOBULIN SER CALC-MCNC: 4 G/DL (ref 2–4)
GLUCOSE BLD STRIP.AUTO-MCNC: 134 MG/DL (ref 65–117)
GLUCOSE BLD STRIP.AUTO-MCNC: 144 MG/DL (ref 65–117)
GLUCOSE BLD STRIP.AUTO-MCNC: 155 MG/DL (ref 65–117)
GLUCOSE BLD STRIP.AUTO-MCNC: 239 MG/DL (ref 65–117)
GLUCOSE BLD STRIP.AUTO-MCNC: 265 MG/DL (ref 65–117)
GLUCOSE SERPL-MCNC: 192 MG/DL (ref 65–100)
HCT VFR BLD AUTO: 22.4 % (ref 36.6–50.3)
HCT VFR BLD AUTO: 23.3 % (ref 36.6–50.3)
HCT VFR BLD AUTO: 23.8 % (ref 36.6–50.3)
HGB BLD-MCNC: 7.1 G/DL (ref 12.1–17)
HGB BLD-MCNC: 7.2 G/DL (ref 12.1–17)
HGB BLD-MCNC: 7.6 G/DL (ref 12.1–17)
IMM GRANULOCYTES # BLD AUTO: 0.1 K/UL (ref 0–0.04)
IMM GRANULOCYTES NFR BLD AUTO: 1 % (ref 0–0.5)
INR PPP: 1.3 (ref 0.9–1.1)
LYMPHOCYTES # BLD: 2 K/UL (ref 0.8–3.5)
LYMPHOCYTES NFR BLD: 14 % (ref 12–49)
MAGNESIUM SERPL-MCNC: 1.9 MG/DL (ref 1.6–2.4)
MCH RBC QN AUTO: 23.7 PG (ref 26–34)
MCHC RBC AUTO-ENTMCNC: 31.7 G/DL (ref 30–36.5)
MCV RBC AUTO: 74.9 FL (ref 80–99)
MONOCYTES # BLD: 1.8 K/UL (ref 0–1)
MONOCYTES NFR BLD: 13 % (ref 5–13)
NEUTS SEG # BLD: 10 K/UL (ref 1.8–8)
NEUTS SEG NFR BLD: 71 % (ref 32–75)
NRBC # BLD: 0 K/UL (ref 0–0.01)
NRBC BLD-RTO: 0 PER 100 WBC
PLATELET # BLD AUTO: 215 K/UL (ref 150–400)
PMV BLD AUTO: 12.5 FL (ref 8.9–12.9)
POTASSIUM SERPL-SCNC: 3.7 MMOL/L (ref 3.5–5.1)
PROT SERPL-MCNC: 6.6 G/DL (ref 6.4–8.2)
PROTHROMBIN TIME: 13.1 SEC (ref 9–11.1)
RBC # BLD AUTO: 2.99 M/UL (ref 4.1–5.7)
RBC MORPH BLD: ABNORMAL
SAMPLES BEING HELD,HOLD: NORMAL
SERVICE CMNT-IMP: ABNORMAL
SODIUM SERPL-SCNC: 144 MMOL/L (ref 136–145)
WBC # BLD AUTO: 14 K/UL (ref 4.1–11.1)

## 2021-05-25 PROCEDURE — 74011250636 HC RX REV CODE- 250/636: Performed by: INTERNAL MEDICINE

## 2021-05-25 PROCEDURE — 82962 GLUCOSE BLOOD TEST: CPT

## 2021-05-25 PROCEDURE — 83735 ASSAY OF MAGNESIUM: CPT

## 2021-05-25 PROCEDURE — C9113 INJ PANTOPRAZOLE SODIUM, VIA: HCPCS | Performed by: STUDENT IN AN ORGANIZED HEALTH CARE EDUCATION/TRAINING PROGRAM

## 2021-05-25 PROCEDURE — 74011636637 HC RX REV CODE- 636/637: Performed by: INTERNAL MEDICINE

## 2021-05-25 PROCEDURE — 74011636637 HC RX REV CODE- 636/637: Performed by: STUDENT IN AN ORGANIZED HEALTH CARE EDUCATION/TRAINING PROGRAM

## 2021-05-25 PROCEDURE — 74011250636 HC RX REV CODE- 250/636: Performed by: STUDENT IN AN ORGANIZED HEALTH CARE EDUCATION/TRAINING PROGRAM

## 2021-05-25 PROCEDURE — 80076 HEPATIC FUNCTION PANEL: CPT

## 2021-05-25 PROCEDURE — 74011250637 HC RX REV CODE- 250/637: Performed by: STUDENT IN AN ORGANIZED HEALTH CARE EDUCATION/TRAINING PROGRAM

## 2021-05-25 PROCEDURE — 74011250637 HC RX REV CODE- 250/637: Performed by: INTERNAL MEDICINE

## 2021-05-25 PROCEDURE — 70450 CT HEAD/BRAIN W/O DYE: CPT

## 2021-05-25 PROCEDURE — 86704 HEP B CORE ANTIBODY TOTAL: CPT

## 2021-05-25 PROCEDURE — 74011000250 HC RX REV CODE- 250: Performed by: STUDENT IN AN ORGANIZED HEALTH CARE EDUCATION/TRAINING PROGRAM

## 2021-05-25 PROCEDURE — 65660000000 HC RM CCU STEPDOWN

## 2021-05-25 PROCEDURE — 85610 PROTHROMBIN TIME: CPT

## 2021-05-25 PROCEDURE — 80048 BASIC METABOLIC PNL TOTAL CA: CPT

## 2021-05-25 PROCEDURE — 85014 HEMATOCRIT: CPT

## 2021-05-25 PROCEDURE — 74011000258 HC RX REV CODE- 258: Performed by: INTERNAL MEDICINE

## 2021-05-25 PROCEDURE — 36415 COLL VENOUS BLD VENIPUNCTURE: CPT

## 2021-05-25 PROCEDURE — 82140 ASSAY OF AMMONIA: CPT

## 2021-05-25 PROCEDURE — 85025 COMPLETE CBC W/AUTO DIFF WBC: CPT

## 2021-05-25 RX ADMIN — HYDROCHLOROTHIAZIDE 12.5 MG: 25 TABLET ORAL at 08:13

## 2021-05-25 RX ADMIN — INSULIN LISPRO 3 UNITS: 100 INJECTION, SOLUTION INTRAVENOUS; SUBCUTANEOUS at 08:16

## 2021-05-25 RX ADMIN — INSULIN LISPRO 4 UNITS: 100 INJECTION, SOLUTION INTRAVENOUS; SUBCUTANEOUS at 11:37

## 2021-05-25 RX ADMIN — Medication 10 ML: at 22:56

## 2021-05-25 RX ADMIN — GABAPENTIN 300 MG: 300 CAPSULE ORAL at 16:58

## 2021-05-25 RX ADMIN — INSULIN GLARGINE 67 UNITS: 100 INJECTION, SOLUTION SUBCUTANEOUS at 08:16

## 2021-05-25 RX ADMIN — Medication 10 ML: at 05:44

## 2021-05-25 RX ADMIN — Medication 1000 UNITS: at 08:14

## 2021-05-25 RX ADMIN — SODIUM CHLORIDE 40 MG: 9 INJECTION, SOLUTION INTRAMUSCULAR; INTRAVENOUS; SUBCUTANEOUS at 08:14

## 2021-05-25 RX ADMIN — AMLODIPINE BESYLATE 10 MG: 5 TABLET ORAL at 08:12

## 2021-05-25 RX ADMIN — THIAMINE HYDROCHLORIDE 100 MG: 100 INJECTION, SOLUTION INTRAMUSCULAR; INTRAVENOUS at 08:24

## 2021-05-25 RX ADMIN — INSULIN LISPRO 2 UNITS: 100 INJECTION, SOLUTION INTRAVENOUS; SUBCUTANEOUS at 16:57

## 2021-05-25 RX ADMIN — METOPROLOL TARTRATE 25 MG: 25 TABLET, FILM COATED ORAL at 08:13

## 2021-05-25 RX ADMIN — INSULIN LISPRO 4 UNITS: 100 INJECTION, SOLUTION INTRAVENOUS; SUBCUTANEOUS at 08:16

## 2021-05-25 RX ADMIN — GABAPENTIN 300 MG: 300 CAPSULE ORAL at 08:12

## 2021-05-25 RX ADMIN — SODIUM CHLORIDE 40 MG: 9 INJECTION, SOLUTION INTRAMUSCULAR; INTRAVENOUS; SUBCUTANEOUS at 22:55

## 2021-05-25 RX ADMIN — Medication 10 ML: at 12:58

## 2021-05-25 RX ADMIN — METOPROLOL TARTRATE 25 MG: 25 TABLET, FILM COATED ORAL at 17:00

## 2021-05-25 RX ADMIN — INSULIN LISPRO 5 UNITS: 100 INJECTION, SOLUTION INTRAVENOUS; SUBCUTANEOUS at 11:36

## 2021-05-25 RX ADMIN — ATORVASTATIN CALCIUM 10 MG: 10 TABLET, FILM COATED ORAL at 22:56

## 2021-05-25 RX ADMIN — CEFTRIAXONE 1 G: 1 INJECTION, POWDER, FOR SOLUTION INTRAMUSCULAR; INTRAVENOUS at 17:04

## 2021-05-25 RX ADMIN — LACTULOSE 45 ML: 20 SOLUTION ORAL at 18:57

## 2021-05-25 RX ADMIN — SODIUM CHLORIDE 50 MCG/HR: 9 INJECTION, SOLUTION INTRAVENOUS at 16:36

## 2021-05-25 RX ADMIN — INSULIN LISPRO 4 UNITS: 100 INJECTION, SOLUTION INTRAVENOUS; SUBCUTANEOUS at 16:58

## 2021-05-25 RX ADMIN — SODIUM CHLORIDE 50 MCG/HR: 9 INJECTION, SOLUTION INTRAVENOUS at 05:43

## 2021-05-25 NOTE — PROGRESS NOTES
Patient had altered mental status to usual baseline, and not following commands @17:25. Patient was staring/gazing, RN assessed for stroke, code called. Dr Bladimir Vera informed and assessed patient.  Patient sent for CT of head @17:28

## 2021-05-25 NOTE — PROGRESS NOTES
1940 TRANSFER - IN REPORT:    Verbal report received from Danielle(name) on Megha Regalador  being received from Gen-Surg(unit) for change in patient condition(closer monitoring GI Bleed)      Report consisted of patients Situation, Background, Assessment and   Recommendations(SBAR). Information from the following report(s) SBAR, Kardex, Intake/Output and Cardiac Rhythm NSR was reviewed with the receiving nurse. Opportunity for questions and clarification was provided. Assessment completed upon patients arrival to unit and care assumed. 2030: pt arrived to unit, vss, assessed as noted, octreotide gtt infusing at 50 mcg, blood transfusing at 75 ml/hr, rate increased to 125 ml/hr. Pt educated on call bell, bed in lowest position call bell in reach    2215: pt attempting to exit bed, pt alert and oriented x3 at this time, states was uncomfortable in bed and attempting to exit to reposition self, pt re-educated regarding calling first to prevent falls and injury, pt reports understanding, bed alarm in place, bed in lowest position, call bell in reach      2221: blood transfusion complete at this time, vss, no signs of a transfusion reaction     0500: notified NP that pt's ladt Hgb was 7.1 at 0000 and pt continues to have maroon bowel movements but does not have another re-check order for H&H, orders received      End of Shift Note    Bedside shift change report given to  (oncoming nurse) by Soraida Diez (offgoing nurse). Report included the following information SBAR, Kardex, Intake/Output, Recent Results and Cardiac Rhythm NSR    Shift worked:  7p-7a     Shift summary and any significant changes:     Hgb 7.6, still having maroon bowel movements     Concerns for physician to address:       Zone phone for oncoming shift:          Activity:  Activity Level: Bed Rest  Number times ambulated in hallways past shift: 0  Number of times OOB to chair past shift: 0    Cardiac:   Cardiac Monitoring:  Yes Cardiac Rhythm: Sinus Rhythm    Access:   Current line(s): PIV     Genitourinary:   Urinary status: voiding and incontinent    Respiratory:   O2 Device: None (Room air)  Chronic home O2 use?: YES  Incentive spirometer at bedside: YES     GI:  Last Bowel Movement Date: 05/24/21  Current diet:  DIET CLEAR LIQUID  Passing flatus: YES  Tolerating current diet: YES       Pain Management:   Patient states pain is manageable on current regimen: YES    Skin:  Power Score: 14  Interventions: speciality bed and PT/OT consult    Patient Safety:  Fall Score:  Total Score: 3  Interventions: bed/chair alarm and pt to call before getting OOB  High Fall Risk: Yes    Length of Stay:  Expected LOS: 3d 2h  Actual LOS: 2      Sarina Goodrich

## 2021-05-25 NOTE — PROGRESS NOTES
RAPID RESPONSE TEAM    Responded to overhead CODE STROKE to 2245    Primary nurse reports patient has intermittent \"staring off\" requiring physical stimulation for response and increasing confusion. Reportedly patient was A&O x4 this morning, currently only oriented to self and place. Dr. You Weber at bedside. Patient has history of CVA with RIGHT sided residual deficits per notes. VAN negative. CODE STROKE cxld. RRT paged overhead. Ammonia elevated in the setting of cirrhosis    STAT HEAD WO ordered. Patient transported to CT w/primary RN and RRT RN on monitor. Visit Vitals  /72   Pulse 81   Temp 97.6 °F (36.4 °C)   Resp 16   Ht 6' 2\" (1.88 m)   Wt 156 kg (343 lb 14.7 oz)   SpO2 96%   BMI 44.16 kg/m²       Lab Results   Component Value Date/Time    Glucose 192 (H) 05/25/2021 12:04 AM    Glucose (POC) 144 (H) 05/25/2021 05:24 PM     Results for Myles Chairez (MRN [id number]) as of 5/25/2021 18:17   Ref. Range 5/25/2021 05:42 5/25/2021 07:46 5/25/2021 11:22 5/25/2021 14:04 5/25/2021 16:30 5/25/2021 17:24 5/25/2021 17:50   Ammonia Latest Ref Range: <32 UMOL/L    86 (H)        . Patient to remain in 2245. Please call back if needed.       Isaiah Talbot, LATOYA  RRT, P.830

## 2021-05-25 NOTE — PROGRESS NOTES
Problem: Falls - Risk of  Goal: *Absence of Falls  Description: Document Mateus Joiner Fall Risk and appropriate interventions in the flowsheet.   Outcome: Progressing Towards Goal  Note: Fall Risk Interventions:  Mobility Interventions: Bed/chair exit alarm, OT consult for ADLs, Patient to call before getting OOB, Strengthening exercises (ROM-active/passive)         Medication Interventions: Bed/chair exit alarm, Patient to call before getting OOB, Teach patient to arise slowly    Elimination Interventions: Bed/chair exit alarm, Call light in reach              Problem: Patient Education: Go to Patient Education Activity  Goal: Patient/Family Education  Outcome: Progressing Towards Goal     Problem: Patient Education: Go to Patient Education Activity  Goal: Patient/Family Education  Outcome: Progressing Towards Goal

## 2021-05-25 NOTE — PROGRESS NOTES
Transition of Care Plan:    RUR:  15%    Disposition: Home with wife with home health services vs snf     Follow up appointments: To be made prior to discharge if going home. DME needed: To be determined. Transportation at Discharge: Family vs BLS. [place] or means to access home:  Wife has keys. IM Medicare letter: To be given prior to discharge. Caregiver Contact: Wife (Yves Durbin)    Discharge Caregiver contacted prior to discharge? Caregiver to be contacted prior to discharge. Reason for Admission:  Patient was at [facility] and transferred to [facility]. He had low hemoglobin and rectal bleeding. Patient was on general surgery unit at [facility] and transferred to PCU. RUR Score:   15%                  Plan for utilizing home health:  He has been to [address] [place] [address] in the past. He has had home health in the past however wife could not recall the name of the agency. He has also done op at [facility] in [place]. PCP: First and Last name:  MD Fabrice Lance NP     Name of Practice: [facility]. Are you a current patient: Yes/No: Yes     Approximate date of last visit: April 30th     Can you participate in a virtual visit with your PCP: Yes with assistance.                       Current Advanced Directive/Advance Care Plan: Full Code  Advance Care Planning     General Advance Care Planning (ACP) Conversation      Date of Conversation: 5/25/21  Conducted with: Patient with Decision Making Capacity    Healthcare Decision Maker:     Click here to complete [address] including selection of the Healthcare Decision Maker Relationship (ie \"Primary\")      Content/Action Overview:   DECLINED ACP conversation - will revisit periodically   Reviewed DNR/DNI and patient elects Full Code (Attempt Resuscitation)         Length of Voluntary ACP Conversation in minutes:  <16 minutes (Non-Billable)    Ana Pascal, RN Healthcare Decision Maker:   Click here to complete [address] including selection of the Healthcare Decision Maker Relationship (ie \"Primary\")                             Patient lives in two story home with his wife. Spoke with wife and she states she assists patient with adl's/iadl's. He can feed himself if the food is set up for him. Wife transports him to appointments. He uses a cane or walker. Confirmed demographics and pcp with the wife. He has been informed to quit drinking. He had a stroke in 2014. Wife works every day she states from 0730 to 1800pm. Patient had been at home alone when she was working. She states she is usually at home by 1800pm. They have two children one in [place] and one in [facility]. Wife states he sees Ebony HARKINS at [facility]. Wife states they were having someone come in from [facility] however the patient knew the cna's family and did not want her to come. Per wife states he was approved for medicaid thru [place] in March. Per wife his last drink was 5/22/21. She states he drinks a lot and md has informed patient to stop drinking. Dora Cobb RN BSN CRM        [phone]

## 2021-05-25 NOTE — PROGRESS NOTES
0700: Bedside shift change report given to Benjamin Jimenes, RN and [facility], RN (oncoming nurse) by Leticia Morris, LATOYA (offgoing nurse). Report included the following information SBAR, Kardex, Intake/Output, MAR and Recent Results. 0700: Erik Perez will be documenting on patient.     4693: Octreotide drip rate verified with 2 Rns, see MAR.     0930: Dr. Jasper Gao at bedside assessing patient. Patient lethargic, but eay to arouse. Ammonia ordered for this afternoon, appreciate GI input. 1100: Darylene Mince, NP with GI at bedside. Made aware of patient's BM with blood and stable HGB. Patient had a small smear while NP was present and it was more brown in color. Will repeat HGB at 1400 and monitor closely for now. 1725: Erik Perez entered room to administer patient's medications. Patient was starring off to space and not not able to answer orientation questions. He was A&OX2, not able to focus and very garbled speech which was a change from his baseline assessment. NIH scale completed on patient and rapid response called over head. Dr. Jasper Gao and Skyler Dior RN at bedside assessing patient. Patient does have residual from a previous stroke and Ammonia level just resulted 86. Given his baseline residual, Dr. Jasper Gao does not want a code neuro, but a STAT head CT ordered. Patient taken down to CT at this time with myself and Jessie, [address] [facility]: Patient back in room, A&OX2/3. VSS. Will await head CT results, wife at bedside- call bell in reach, bed alarm on.     1900: I have reviewed and agree with [facility], RN documentation.

## 2021-05-25 NOTE — PROGRESS NOTES
GI PROGRESS NOTE  Roosevelt Merinon, NP  [phone] NP in-hospital cell phone M-F until 4:30  After 5pm or on weekends, please call  for physician on call    Shasta Postin :1960 ECC:[id number]   ATTG: Dr Judah Lennox  PCP: Caden Jones MD  Date/Time:  2021 1:41 PM     Primary GI: Dr. Hi Jose    Assessment:   Acute anemia - GI bleed - Melena x 2 today - On plavix (last dose 21) - NSAID use  Hematemesis x1 - none today  Esophageal varices with likely recent active bleeding - banded x 6. New Dx of Cirrhosis - hx of ETOH abuse  - PTA -  5 ibuprofen daily for weeks now - last about 2 days ago  - Maroon stool by nursing small volume x 3 in the last 24 hours but I viewed dark, nonbloody smear on bed.  - Hgb 6.2 on admission, s/p 3 units, currently 7.6  - Plt 215, TB 0.8, ALT 45, AST 54,     CT abd/pel 21: 1. There is no visible gastrointestinal bleeding. 2. Nodular liver contour with somewhat heterogeneous parenchyma suggestive of  cirrhosis. 3. Incidental findings as above - cholelithiasis. EGD 21: 1. Large varices with red shalini stigmata and 'white nipple' sign suggesting very recent active bleeding. Six bands successfully placed with deflation of varices, including band placed directly over the 'white nipple'  2. Normal stomach, including retroflexion. Notably no gastric varices  3. Normal duodenal bulb and 2nd/3rd portion of the duodenum     ETOH abuse - current use - last drink 21  - Drinks about 3 shots of liquor daily for the last year with COVID but has drank daily for his whole life. DMII  Metabolic acidosis  ABBEY  CVA 5 years ago - on plavix since that time     Plan:   · Continue clear liquids today  · Continue BID PPI  · Continue Octreotide drip  · Discussed ETOH abstinence - pt agreeable  · Continue Ceftriaxone 1gm daily  · If pt rebleeds during this admission, will need to go for emergent TIPS procedures  · Hold plavix for 7 days.      Will continue to follow. Plan discussed with Dr Jake Torre. Subjective:   Discussed with RN events overnight. Doing well today, agreeable to plan of care and states he will stopo drinking - \"I want to save my own life. \"    Complaint Y/N Description   Abdominal Pain n    Hematemesis n    Hematochezia n    Melena  Dark stools   Constipation n    Diarrhea n    Dyspepsia n    Dysphagia n    Jaundiced n    Nausea/vomiting n      Review of Systems:  Symptom Y/N Comments  Symptom Y/N Comments   Fever/Chills    Chest Pain     Cough    Headaches     Sputum    Joint Pain     SOB/SHEPHERD    Pruritis/Rash     Tolerating Diet y clears  Other       Could NOT obtain due to:      Objective:   VITALS:   Last 24hrs VS reviewed since prior progress note. Most recent are:  Visit Vitals  BP (!) 131/45   Pulse 88   Temp 98.4 °F (36.9 °C)   Resp 19   Ht 6' 2\" (1.88 m)   Wt 156 kg (343 lb 14.7 oz)   SpO2 96%   BMI 44.16 kg/m²       Intake/Output Summary (Last 24 hours) at 5/25/2021 1341  Last data filed at 5/25/2021 1121  Gross per 24 hour   Intake 2214.97 ml   Output --   Net 2214.97 ml     PHYSICAL EXAM:  General: WD, obese. Alert, cooperative, no acute distress. HEENT: NC, Atraumatic. Anicteric sclerae. Lungs:  CTA Bilaterally. No Wheezing/Rhonchi/Rales. Heart:  Regular  rhythm,  No murmur, No Rubs, No Gallops. Abdomen: Soft, Non-distended, Non tender.  +Bowel sounds, no HSM  Extremities: BLE +1 edema  Neurologic:  Alert and oriented X 3. No acute neurological distress. Psych:   Good insight. Not anxious nor agitated. Lab and Radiology Data Reviewed: (see below)    Medications Reviewed: (see below)  PMH/SH reviewed - no change compared to H&P  ________________________________________________________________________  Total time spent with patient: 20 minutes ________________________________________________________________________  Care Plan discussed with:  Patient y   Family     Angelic Bills              Consultant:        Alice Bobby NP Procedures: see electronic medical records for all procedures/Xrays and details which were not copied into this note but were reviewed prior to creation of Plan. LABS:  Recent Labs     05/25/21  0542 05/25/21  0004 05/24/21  1355   WBC  --  14.0* 17.4*   HGB 7.6* 7.1* 6.5*   HCT 23.8* 22.4* 21.0*   PLT  --  215 244     Recent Labs     05/25/21  0004 05/24/21  0056    141   K 3.7 4.2   * 106   CO2 28 25   BUN 60* 59*   CREA 1.74* 1.95*   * 158*   CA 7.7* 7.5*   MG 1.9 1.5*     Recent Labs     05/25/21  0004 05/24/21  0056   * 206*   TP 6.6 6.2*   ALB 2.6* 2.1*   GLOB 4.0 4.1*     Recent Labs     05/25/21  0004   INR 1.3*   PTP 13.1*      Recent Labs     05/24/21  0056   TIBC 301   PSAT 24   FERR 21*      Lab Results   Component Value Date/Time    Folate 18.0 05/24/2021 12:56 AM     No results for input(s): PH, PCO2, PO2 in the last 72 hours. No results for input(s): CPK, CKMB in the last 72 hours.     No lab exists for component: TROPONINI  Lab Results   Component Value Date/Time    Color DARK YELLOW 05/28/2014 06:47 PM    Appearance CLEAR 05/28/2014 06:47 PM    Specific gravity >1.030 (H) 05/28/2014 06:47 PM    pH (UA) 5.0 05/28/2014 06:47 PM    Protein 100 (A) 05/28/2014 06:47 PM    Glucose >1,000 (A) 05/28/2014 06:47 PM    Ketone TRACE (A) 05/28/2014 06:47 PM    Bilirubin SMALL (A) 05/28/2014 06:47 PM    Urobilinogen 1.0 05/28/2014 06:47 PM    Nitrites NEGATIVE  05/28/2014 06:47 PM    Leukocyte Esterase NEGATIVE  05/28/2014 06:47 PM    Epithelial cells FEW 05/28/2014 06:47 PM    Bacteria NEGATIVE  05/28/2014 06:47 PM    WBC 0-4 05/28/2014 06:47 PM    RBC 5-10 05/28/2014 06:47 PM       MEDICATIONS:  Current Facility-Administered Medications   Medication Dose Route Frequency    0.9% sodium chloride infusion 250 mL  250 mL IntraVENous PRN    sodium chloride (NS) flush 5-40 mL  5-40 mL IntraVENous Q8H    sodium chloride (NS) flush 5-40 mL  5-40 mL IntraVENous PRN    octreotide (SANDOSTATIN) 500 mcg in 0.9% sodium chloride 500 mL infusion  50 mcg/hr IntraVENous CONTINUOUS    0.9% sodium chloride infusion 250 mL  250 mL IntraVENous PRN    cefTRIAXone (ROCEPHIN) 1 g in 0.9% sodium chloride (MBP/ADV) 50 mL MBP  1 g IntraVENous Q24H    insulin lispro (HUMALOG) injection 4 Units  4 Units SubCUTAneous TIDAC    And    insulin glargine (LANTUS) injection 67 Units  67 Units SubCUTAneous DAILY    metoprolol tartrate (LOPRESSOR) tablet 25 mg  25 mg Oral BID    thiamine (B-1) 100 mg in 0.9% sodium chloride 50 mL IVPB  100 mg IntraVENous DAILY    [START ON 5/27/2021] thiamine mononitrate (B-1) tablet 100 mg  100 mg Oral DAILY    pantoprazole (PROTONIX) 40 mg in 0.9% sodium chloride 10 mL injection  40 mg IntraVENous Q12H    0.9% sodium chloride infusion  100 mL/hr IntraVENous CONTINUOUS    acetaminophen (TYLENOL) tablet 650 mg  650 mg Oral Q6H PRN    Or    acetaminophen (TYLENOL) suppository 650 mg  650 mg Rectal Q6H PRN    polyethylene glycol (MIRALAX) packet 17 g  17 g Oral DAILY PRN    ondansetron (ZOFRAN ODT) tablet 4 mg  4 mg Oral Q8H PRN    Or    ondansetron (ZOFRAN) injection 4 mg  4 mg IntraVENous Q6H PRN    atorvastatin (LIPITOR) tablet 10 mg  10 mg Oral QHS    cholecalciferol (VITAMIN D3) (1000 Units /25 mcg) tablet 1,000 Units  1,000 Units Oral DAILY    hydroCHLOROthiazide (HYDRODIURIL) tablet 12.5 mg  12.5 mg Oral DAILY    gabapentin (NEURONTIN) capsule 300 mg  300 mg Oral TID    amLODIPine (NORVASC) tablet 10 mg  10 mg Oral DAILY    insulin lispro (HUMALOG) injection   SubCUTAneous AC&HS    glucose chewable tablet 16 g  4 Tablet Oral PRN    dextrose (D50W) injection syrg 12.5-25 g  12.5-25 g IntraVENous PRN    glucagon (GLUCAGEN) injection 1 mg  1 mg IntraMUSCular PRN    LORazepam (ATIVAN) tablet 2 mg  2 mg Oral Q4H PRN

## 2021-05-25 NOTE — ANESTHESIA POSTPROCEDURE EVALUATION
Procedure(s):  ESOPHAGOGASTRODUODENOSCOPY (EGD)  ENDOSCOPIC BANDING OR LIGATION. MAC    Anesthesia Post Evaluation        Patient location during evaluation: PACU  Note status: Adequate. Level of consciousness: responsive to verbal stimuli and sleepy but conscious  Pain management: satisfactory to patient  Airway patency: patent  Anesthetic complications: no  Cardiovascular status: acceptable  Respiratory status: acceptable  Hydration status: acceptable  Comments: +Post-Anesthesia Evaluation and Assessment    Patient: Dread Tariq MRN: [id number]  SSN: [id number]   YOB: 1960  Age: 57 y.o. Sex: male      Cardiovascular Function/Vital Signs    BP (!) 131/45   Pulse 88   Temp 36.9 °C (98.4 °F)   Resp 19   Ht 6' 2\" (1.88 m)   Wt 156 kg (343 lb 14.7 oz)   SpO2 96%   BMI 44.16 kg/m²     Patient is status post Procedure(s):  ESOPHAGOGASTRODUODENOSCOPY (EGD)  ENDOSCOPIC BANDING OR LIGATION. Nausea/Vomiting: Controlled. Postoperative hydration reviewed and adequate. Pain:  Pain Scale 1: Numeric (0 - 10) (05/25/21 1037)  Pain Intensity 1: 0 (05/25/21 1037)   Managed. Neurological Status: At baseline. Mental Status and Level of Consciousness: Arousable. Pulmonary Status:   O2 Device: None (Room air) (05/25/21 0730)   Adequate oxygenation and airway patent. Complications related to anesthesia: None    Post-anesthesia assessment completed. No concerns. Signed By: Opal Cabrera MD    5/25/2021  Post anesthesia nausea and vomiting:  controlled      INITIAL Post-op Vital signs:   Vitals Value Taken Time   /43 05/25/21 1300   Temp 36.9 °C (98.4 °F) 05/25/21 1037   Pulse 83 05/25/21 1303   Resp 19 05/25/21 1303   SpO2 100 % 05/25/21 1303   Vitals shown include unvalidated device data.

## 2021-05-25 NOTE — PROGRESS NOTES
Hospitalist Progress Note    NAME: Hollie Paiz   :  1960   MRN:  [id number]     Admit date: 2021    Today's date: 21    PCP: Jacquie Hernandez MD      Assessment / Plan:    AMS  Hepatic encephalopathy  -was more confused this afternoon, earlier concern for CVA, but has baseline right side weakness, following commands intermittently, no apparent focal deficits, more consistent with encephalopathic process    -ammonia level was checked, 86.  -give lactulose, if he is able to take it safely, which he should  -Hold gabapentin  -CT head to rule out bleed        Acute kidney injury POA acute BUN/creat 59/1.95  - Baseline creatinine unclear, was 1.36 several days ago  - Suspect hypovolemia with the bleeding  - IVF  - Albumin 25 g x 2 doses  - No hydronephrosis  - Serial labs    Acute blood less anemia POA HgB 15.5 to 6.2  Esophageal varices POA  Upper GI bleed POA  Alcohol-induced cirrhosis POA  Alcohol abuse POA  - HgB at PCPs office was low 6.2,  sent to ED  -CT abdomen/pelvis with nodular liver, suggestive of cirrhosis        No bleeding source  -EGD  with Dr Inder Mari       Large varices with red shalini stigmata and 'white nipple'                 Suggests very recent active bleeding       6 bands were placed  - s/p 4 units PRBC  - octreotide gtt, continue  -IV Protonix twice daily  - IV ceftriaxone for prophylaxis BID  -CIWA protocol  -Hold the Plavix and the aspirin for 7-10 days          Patient taken this because of the CVA with right-sided deficit  -Patient continues to have bowel movements with dark red blood  -H&H this a.m. 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 Code Status: Full code  Surrogate Decision Maker: Wife     DVT Prophylaxis: SCD  GI Prophylaxis: not indicated     Baseline: Ambulatory at home with assistance       Subjective:     Chief Complaint / Reason for Physician Visit f/u Gi bleed  \"Okay\". Discussed with RN events overnight. Minimally confused the same, otherwise oriented x3. Had 3 bowel movements with dark red blood    Review of Systems:  Symptom Y/N Comments  Symptom Y/N Comments   Fever/Chills n   Chest Pain n    Poor Appetite    Edema     Cough n   Abdominal Pain n    Sputum    Joint Pain     SOB/SHEPHERD n   Headache     Nausea/vomit n   Tolerating PT/OT     Diarrhea n   Tolerating Diet y    Constipation    Other       Could NOT obtain due to:      Objective:     VITALS:   Last 24hrs VS reviewed since prior progress note.  Most recent are:  Patient Vitals for the past 24 hrs:   Temp Pulse Resp BP SpO2   05/25/21 1130 -- 87 19 (!) 131/45 96 %   05/25/21 1037 98.4 °F (36.9 °C) 97 10 (!) 132/52 95 %   05/25/21 0745 -- (!) 106 -- -- --   05/25/21 0730 97.6 °F (36.4 °C) (!) 107 11 (!) 140/43 97 %   05/25/21 0400 -- 90 -- -- --   05/25/21 0300 97.4 °F (36.3 °C) 90 12 (!) 141/60 95 %   05/25/21 0000 -- 96 -- (!) 132/54 --   05/24/21 2300 -- -- -- (!) 119/48 --   05/24/21 2221 98.4 °F (36.9 °C) 96 20 (!) 137/50 100 %   05/24/21 2031 98.1 °F (36.7 °C) 94 18 (!) 133/44 98 %   05/24/21 2000 -- 95 -- -- --   05/24/21 1909 98.9 °F (37.2 °C) 95 20 (!) 145/81 94 %   05/24/21 1854 98.3 °F (36.8 °C) 94 20 (!) 170/84 97 %   05/24/21 1600 -- 96 -- -- --   05/24/21 1552 98.7 °F (37.1 °C) (!) 104 20 (!) 111/40 98 %   05/24/21 1343 98.2 °F (36.8 °C) 99 20 113/62 96 %   05/24/21 1321 -- (!) 105 21 (!) 106/46 100 %   05/24/21 1318 -- (!) 104 21 (!) 121/47 99 %   05/24/21 1315 -- (!) 106 21 113/63 99 %   05/24/21 1312 -- (!) 108 22 (!) 102/36 99 %   05/24/21 1309 -- (!) 109 22 (!) 139/49 100 %   05/24/21 1306 -- (!) 107 23 (!) 146/104 98 %   05/24/21 1305 97.8 °F (36.6 °C) (!) 107 24 (!) 97/39 98 %   05/24/21 1258 -- (!) 101 14 107/68 100 %   05/24/21 1220 97 °F (36.1 °C) (!) 107 22 123/79 100 %   05/24/21 1200 -- 94 -- -- --       Intake/Output Summary (Last 24 hours) at 5/25/2021 1144  Last data filed at 5/25/2021 1121  Gross per 24 hour   Intake 2314.97 ml   Output --   Net 2314.97 ml        Wt Readings from Last 12 Encounters:   05/23/21 156 kg (343 lb 14.7 oz)   04/19/18 151.5 kg (334 lb)   09/25/17 (!) 158.8 kg (350 lb 3.2 oz)   03/27/17 (!) 163 kg (359 lb 4.8 oz)   11/28/16 (!) 167.1 kg (368 lb 6.4 oz)   06/28/16 (!) 167.4 kg (369 lb)   03/28/16 (!) 167.5 kg (369 lb 3.2 oz)   12/29/15 (!) 167.9 kg (370 lb 3.2 oz)   10/19/15 (!) 164.7 kg (363 lb)   08/27/15 (!) 160.4 kg (353 lb 9.6 oz)   05/29/15 156.5 kg (345 lb)   05/20/15 154.2 kg (340 lb)       PHYSICAL EXAM:    I had a face to face encounter and independently examined this patient on 05/25/21 as outlined below:    General: WD, WN. Alert, cooperative, no acute distress    EENT:  PERRL. Anicteric sclerae. Pale MM  Resp:  CTA bilaterally, no wheezing or rales. No accessory muscle use  CV:  Regular  rhythm,  No edema  GI:  Soft, Non distended, Non tender. +Bowel sounds, no rebound  LN:  No cervical or inguinal DANA  Neurologic:  Alert and oriented X 3, normal speech, non focal motor exam  Psych:   Good insight. Not anxious nor agitated  Skin:  No rashes.   No jaundice    Reviewed most current lab test results and cultures  YES  Reviewed most current radiology test results   YES  Review and summation of old records today    NO  Reviewed patient's current orders and [facility] YES  PMH/SH reviewed - no change compared to H&P  ________________________________________________________________________  Care Plan discussed with:    Comments   Patient x    Family      RN x    Care Manager     Consultant                        Multidiciplinary team rounds were held today with , nursing, pharmacist and clinical coordinator. Patient's plan of care was discussed; medications were reviewed and discharge planning was addressed. ________________________________________________________________________      Comments   >50% of visit spent in counseling and coordination of care     ________________________________________________________________________  Kaden Zaidi MD     Procedures: see electronic medical records for all procedures/Xrays and details which were not copied into this note but were reviewed prior to creation of Plan. LABS:  I reviewed today's most current labs and imaging studies.   Pertinent labs include:  Recent Labs     05/25/21  0542 05/25/21  0004 05/24/21  1355 05/24/21  0056   WBC  --  14.0* 17.4* 17.8*   HGB 7.6* 7.1* 6.5* 6.2*   HCT 23.8* 22.4* 21.0* 20.2*   PLT  --  215 244 244     Recent Labs     05/25/21  0004 05/24/21  0056    141   K 3.7 4.2   * 106   CO2 28 25   * 158*   BUN 60* 59*   CREA 1.74* 1.95*   CA 7.7* 7.5*   MG 1.9 1.5*   ALB 2.6* 2.1*   TBILI 0.8 1.0   ALT 45 46   INR 1.3*  --

## 2021-05-25 NOTE — INTERDISCIPLINARY ROUNDS
Interdisciplinary team rounds were held 5/25/2021 with the following team members:Care Management, Nursing, Nutrition, Pharmacy, Physician and   Charge RN     Plan of care discussed. See clinical pathway and/or care plan for interventions and desired outcomes.  Bloody stools, continue to monitor Hmg.

## 2021-05-25 NOTE — PROGRESS NOTES
End of Shift Note    Bedside shift change report given to Zeke Kumar (oncoming nurse) by Silvia Sargent RN (offgoing nurse). Report included the following information SBAR, Kardex, Procedure Summary, Intake/Output, MAR, Recent Results and Cardiac Rhythm . Shift worked:  Day     Shift summary and any significant changes:     ALBERTINA in AM, Darker stool, GI Dr saw patient. Still on Sandostatin 50mcg, Pt has drowsiness in AM responds to voice. HGB in PM 7.2, ammonia 86, HCT 23.3.  @17:25 pt has altered mental status change, and staring/gazing, rapid response called and Dr Jasper Gao notified. CT of head performed, No new changes. Lactulose added for high ammonia     Concerns for physician to address:  none     Zone phone for oncoming shift:   [phone]       Activity:  Activity Level: Bed Rest  Number times ambulated in hallways past shift: 0  Number of times OOB to chair past shift: 0    Cardiac:   Cardiac Monitoring: Yes      Cardiac Rhythm: Sinus Rhythm    Access:   Current line(s): PIV     Genitourinary:   Urinary status: voiding and incontinent    Respiratory:   O2 Device: None (Room air)  Chronic home O2 use?: NO  Incentive spirometer at bedside: NO     GI:  Last Bowel Movement Date: 05/24/21  Current diet:  DIET CLEAR LIQUID  Passing flatus: YES  Tolerating current diet: YES       Pain Management:   Patient states pain is manageable on current regimen: YES    Skin:  Power Score: 14  Interventions: turn team, speciality bed, float heels, PT/OT consult and nutritional support     Patient Safety:  Fall Score:  Total Score: 3  Interventions: bed/chair alarm, gripper socks and pt to call before getting OOB  High Fall Risk: Yes    Length of Stay:  Expected LOS: 3d 2h  Actual LOS: 2      Silvia Sargent, RN

## 2021-05-26 LAB
ALBUMIN SERPL-MCNC: 2.7 G/DL (ref 3.5–5)
ALBUMIN/GLOB SERPL: 0.6 {RATIO} (ref 1.1–2.2)
ALP SERPL-CCNC: 191 U/L (ref 45–117)
ALT SERPL-CCNC: 64 U/L (ref 12–78)
AMMONIA PLAS-SCNC: 88 UMOL/L
ANION GAP SERPL CALC-SCNC: 4 MMOL/L (ref 5–15)
AST SERPL-CCNC: 82 U/L (ref 15–37)
BASOPHILS # BLD: 0 K/UL (ref 0–0.1)
BASOPHILS NFR BLD: 0 % (ref 0–1)
BILIRUB DIRECT SERPL-MCNC: 0.4 MG/DL (ref 0–0.2)
BILIRUB SERPL-MCNC: 0.7 MG/DL (ref 0.2–1)
BUN SERPL-MCNC: 32 MG/DL (ref 6–20)
BUN/CREAT SERPL: 23 (ref 12–20)
CALCIUM SERPL-MCNC: 7.8 MG/DL (ref 8.5–10.1)
CHLORIDE SERPL-SCNC: 111 MMOL/L (ref 97–108)
CO2 SERPL-SCNC: 29 MMOL/L (ref 21–32)
COMMENT, 144067: NORMAL
CREAT SERPL-MCNC: 1.37 MG/DL (ref 0.7–1.3)
DIFFERENTIAL METHOD BLD: ABNORMAL
EOSINOPHIL # BLD: 0.4 K/UL (ref 0–0.4)
EOSINOPHIL NFR BLD: 3 % (ref 0–7)
ERYTHROCYTE [DISTWIDTH] IN BLOOD BY AUTOMATED COUNT: 25.5 % (ref 11.5–14.5)
GLOBULIN SER CALC-MCNC: 4.3 G/DL (ref 2–4)
GLUCOSE BLD STRIP.AUTO-MCNC: 142 MG/DL (ref 65–117)
GLUCOSE BLD STRIP.AUTO-MCNC: 172 MG/DL (ref 65–117)
GLUCOSE BLD STRIP.AUTO-MCNC: 186 MG/DL (ref 65–117)
GLUCOSE BLD STRIP.AUTO-MCNC: 208 MG/DL (ref 65–117)
GLUCOSE SERPL-MCNC: 150 MG/DL (ref 65–100)
HBV CORE AB SERPL QL IA: NEGATIVE
HBV CORE IGM SERPL QL IA: NEGATIVE
HBV E AB SERPL QL IA: NEGATIVE
HBV E AG SERPL QL IA: NEGATIVE
HBV SURFACE AB SER QL: NON REACTIVE INDEX VALUE
HBV SURFACE AG SERPL QL IA: NEGATIVE
HCT VFR BLD AUTO: 22.4 % (ref 36.6–50.3)
HCT VFR BLD AUTO: 22.9 % (ref 36.6–50.3)
HCT VFR BLD AUTO: 26.7 % (ref 36.6–50.3)
HCV AB S/CO SERPL IA: <0.1 S/CO RATIO (ref 0–0.9)
HGB BLD-MCNC: 7 G/DL (ref 12.1–17)
HGB BLD-MCNC: 7.1 G/DL (ref 12.1–17)
HGB BLD-MCNC: 8.5 G/DL (ref 12.1–17)
HISTORY CHECKED?,CKHIST: NORMAL
IMM GRANULOCYTES # BLD AUTO: 0.1 K/UL (ref 0–0.04)
IMM GRANULOCYTES NFR BLD AUTO: 1 % (ref 0–0.5)
LYMPHOCYTES # BLD: 1.6 K/UL (ref 0.8–3.5)
LYMPHOCYTES NFR BLD: 13 % (ref 12–49)
MAGNESIUM SERPL-MCNC: 1.8 MG/DL (ref 1.6–2.4)
MCH RBC QN AUTO: 23.6 PG (ref 26–34)
MCHC RBC AUTO-ENTMCNC: 31.3 G/DL (ref 30–36.5)
MCV RBC AUTO: 75.7 FL (ref 80–99)
MONOCYTES # BLD: 1.2 K/UL (ref 0–1)
MONOCYTES NFR BLD: 10 % (ref 5–13)
NEUTS SEG # BLD: 8.6 K/UL (ref 1.8–8)
NEUTS SEG NFR BLD: 73 % (ref 32–75)
NRBC # BLD: 0 K/UL (ref 0–0.01)
NRBC BLD-RTO: 0 PER 100 WBC
PLATELET # BLD AUTO: 179 K/UL (ref 150–400)
PMV BLD AUTO: 11.9 FL (ref 8.9–12.9)
POTASSIUM SERPL-SCNC: 3.4 MMOL/L (ref 3.5–5.1)
PROT SERPL-MCNC: 7 G/DL (ref 6.4–8.2)
RBC # BLD AUTO: 2.96 M/UL (ref 4.1–5.7)
SERVICE CMNT-IMP: ABNORMAL
SODIUM SERPL-SCNC: 144 MMOL/L (ref 136–145)
WBC # BLD AUTO: 11.9 K/UL (ref 4.1–11.1)

## 2021-05-26 PROCEDURE — P9016 RBC LEUKOCYTES REDUCED: HCPCS

## 2021-05-26 PROCEDURE — 83735 ASSAY OF MAGNESIUM: CPT

## 2021-05-26 PROCEDURE — 74011250637 HC RX REV CODE- 250/637: Performed by: INTERNAL MEDICINE

## 2021-05-26 PROCEDURE — 74011250636 HC RX REV CODE- 250/636: Performed by: INTERNAL MEDICINE

## 2021-05-26 PROCEDURE — 36430 TRANSFUSION BLD/BLD COMPNT: CPT

## 2021-05-26 PROCEDURE — 80076 HEPATIC FUNCTION PANEL: CPT

## 2021-05-26 PROCEDURE — 80048 BASIC METABOLIC PNL TOTAL CA: CPT

## 2021-05-26 PROCEDURE — C9113 INJ PANTOPRAZOLE SODIUM, VIA: HCPCS | Performed by: STUDENT IN AN ORGANIZED HEALTH CARE EDUCATION/TRAINING PROGRAM

## 2021-05-26 PROCEDURE — 85025 COMPLETE CBC W/AUTO DIFF WBC: CPT

## 2021-05-26 PROCEDURE — 74011000258 HC RX REV CODE- 258: Performed by: INTERNAL MEDICINE

## 2021-05-26 PROCEDURE — 74011636637 HC RX REV CODE- 636/637: Performed by: INTERNAL MEDICINE

## 2021-05-26 PROCEDURE — 74011636637 HC RX REV CODE- 636/637: Performed by: STUDENT IN AN ORGANIZED HEALTH CARE EDUCATION/TRAINING PROGRAM

## 2021-05-26 PROCEDURE — 65660000000 HC RM CCU STEPDOWN

## 2021-05-26 PROCEDURE — 74011250636 HC RX REV CODE- 250/636: Performed by: STUDENT IN AN ORGANIZED HEALTH CARE EDUCATION/TRAINING PROGRAM

## 2021-05-26 PROCEDURE — 82140 ASSAY OF AMMONIA: CPT

## 2021-05-26 PROCEDURE — 82962 GLUCOSE BLOOD TEST: CPT

## 2021-05-26 PROCEDURE — 74011250637 HC RX REV CODE- 250/637: Performed by: STUDENT IN AN ORGANIZED HEALTH CARE EDUCATION/TRAINING PROGRAM

## 2021-05-26 PROCEDURE — 85014 HEMATOCRIT: CPT

## 2021-05-26 PROCEDURE — 74011000250 HC RX REV CODE- 250: Performed by: STUDENT IN AN ORGANIZED HEALTH CARE EDUCATION/TRAINING PROGRAM

## 2021-05-26 PROCEDURE — 36415 COLL VENOUS BLD VENIPUNCTURE: CPT

## 2021-05-26 RX ORDER — POTASSIUM CHLORIDE 750 MG/1
40 TABLET, FILM COATED, EXTENDED RELEASE ORAL ONCE
Status: COMPLETED | OUTPATIENT
Start: 2021-05-26 | End: 2021-05-26

## 2021-05-26 RX ORDER — SODIUM CHLORIDE 9 MG/ML
250 INJECTION, SOLUTION INTRAVENOUS AS NEEDED
Status: DISCONTINUED | OUTPATIENT
Start: 2021-05-26 | End: 2021-05-29 | Stop reason: ALTCHOICE

## 2021-05-26 RX ORDER — METOPROLOL TARTRATE 50 MG/1
50 TABLET ORAL 2 TIMES DAILY
Status: DISCONTINUED | OUTPATIENT
Start: 2021-05-26 | End: 2021-05-30 | Stop reason: HOSPADM

## 2021-05-26 RX ADMIN — SODIUM CHLORIDE 50 ML/HR: 9 INJECTION, SOLUTION INTRAVENOUS at 19:18

## 2021-05-26 RX ADMIN — INSULIN LISPRO 4 UNITS: 100 INJECTION, SOLUTION INTRAVENOUS; SUBCUTANEOUS at 11:20

## 2021-05-26 RX ADMIN — INSULIN LISPRO 2 UNITS: 100 INJECTION, SOLUTION INTRAVENOUS; SUBCUTANEOUS at 16:55

## 2021-05-26 RX ADMIN — POTASSIUM CHLORIDE 40 MEQ: 750 TABLET, FILM COATED, EXTENDED RELEASE ORAL at 08:21

## 2021-05-26 RX ADMIN — LACTULOSE 30 ML: 20 SOLUTION ORAL at 08:23

## 2021-05-26 RX ADMIN — THIAMINE HYDROCHLORIDE 100 MG: 100 INJECTION, SOLUTION INTRAMUSCULAR; INTRAVENOUS at 09:07

## 2021-05-26 RX ADMIN — AMLODIPINE BESYLATE 10 MG: 5 TABLET ORAL at 08:22

## 2021-05-26 RX ADMIN — LACTULOSE 30 ML: 20 SOLUTION ORAL at 16:55

## 2021-05-26 RX ADMIN — ATORVASTATIN CALCIUM 10 MG: 10 TABLET, FILM COATED ORAL at 22:30

## 2021-05-26 RX ADMIN — POLYETHYLENE GLYCOL 3350 17 G: 17 POWDER, FOR SOLUTION ORAL at 22:32

## 2021-05-26 RX ADMIN — INSULIN LISPRO 4 UNITS: 100 INJECTION, SOLUTION INTRAVENOUS; SUBCUTANEOUS at 08:28

## 2021-05-26 RX ADMIN — Medication 10 ML: at 05:20

## 2021-05-26 RX ADMIN — SODIUM CHLORIDE 50 MCG/HR: 9 INJECTION, SOLUTION INTRAVENOUS at 14:36

## 2021-05-26 RX ADMIN — HYDROCHLOROTHIAZIDE 12.5 MG: 25 TABLET ORAL at 08:23

## 2021-05-26 RX ADMIN — RIFAXIMIN 550 MG: 550 TABLET ORAL at 09:06

## 2021-05-26 RX ADMIN — SODIUM CHLORIDE 100 ML/HR: 9 INJECTION, SOLUTION INTRAVENOUS at 03:21

## 2021-05-26 RX ADMIN — SODIUM CHLORIDE 40 MG: 9 INJECTION, SOLUTION INTRAMUSCULAR; INTRAVENOUS; SUBCUTANEOUS at 21:23

## 2021-05-26 RX ADMIN — Medication 10 ML: at 22:31

## 2021-05-26 RX ADMIN — LACTULOSE 30 ML: 20 SOLUTION ORAL at 22:39

## 2021-05-26 RX ADMIN — SODIUM CHLORIDE 40 MG: 9 INJECTION, SOLUTION INTRAMUSCULAR; INTRAVENOUS; SUBCUTANEOUS at 08:24

## 2021-05-26 RX ADMIN — INSULIN GLARGINE 67 UNITS: 100 INJECTION, SOLUTION SUBCUTANEOUS at 08:26

## 2021-05-26 RX ADMIN — SODIUM CHLORIDE 50 MCG/HR: 9 INJECTION, SOLUTION INTRAVENOUS at 07:12

## 2021-05-26 RX ADMIN — Medication 10 ML: at 13:17

## 2021-05-26 RX ADMIN — INSULIN LISPRO 3 UNITS: 100 INJECTION, SOLUTION INTRAVENOUS; SUBCUTANEOUS at 11:20

## 2021-05-26 RX ADMIN — INSULIN LISPRO 4 UNITS: 100 INJECTION, SOLUTION INTRAVENOUS; SUBCUTANEOUS at 16:55

## 2021-05-26 RX ADMIN — SODIUM CHLORIDE 50 MCG/HR: 9 INJECTION, SOLUTION INTRAVENOUS at 04:38

## 2021-05-26 RX ADMIN — METOPROLOL TARTRATE 50 MG: 50 TABLET, FILM COATED ORAL at 17:21

## 2021-05-26 RX ADMIN — INSULIN LISPRO 2 UNITS: 100 INJECTION, SOLUTION INTRAVENOUS; SUBCUTANEOUS at 08:27

## 2021-05-26 RX ADMIN — Medication 1000 UNITS: at 08:21

## 2021-05-26 RX ADMIN — CEFTRIAXONE 1 G: 1 INJECTION, POWDER, FOR SOLUTION INTRAMUSCULAR; INTRAVENOUS at 17:14

## 2021-05-26 RX ADMIN — RIFAXIMIN 550 MG: 550 TABLET ORAL at 17:20

## 2021-05-26 RX ADMIN — METOPROLOL TARTRATE 25 MG: 25 TABLET, FILM COATED ORAL at 08:22

## 2021-05-26 NOTE — PROGRESS NOTES
End of Shift Note    Bedside shift change report given to Loc Ho RN (oncoming nurse) by Leeann Chen (offgoing nurse). Report included the following information SBAR, Kardex, Procedure Summary, Intake/Output, MAR and Recent Results    Shift worked:  7p-7a     Shift summary and any significant changes:     BM with maroon stool, pt very drowsy still; easily aroused      Concerns for physician to address:    Zone phone for oncoming shift:       Activity:  Activity Level: Bed Rest  Number times ambulated in hallways past shift: 0  Number of times OOB to chair past shift: 0    Cardiac:   Cardiac Monitoring: Yes      Cardiac Rhythm: Sinus Rhythm    Access:   Current line(s): PIV     Genitourinary:   Urinary status: incontinent    Respiratory:   O2 Device: None (Room air)  Chronic home O2 use?: NO  Incentive spirometer at bedside: NO     GI:  Last Bowel Movement Date: 05/25/21  Current diet:  DIET CLEAR LIQUID  Passing flatus: YES  Tolerating current diet: YES       Pain Management:   Patient states pain is manageable on current regimen: YES    Skin:  Power Score: 15  Interventions: turn team, speciality bed, float heels, increase time out of bed and PT/OT consult    Patient Safety:  Fall Score:  Total Score: 3  Interventions: bed/chair alarm  High Fall Risk: Yes    Length of Stay:  Expected LOS: 3d 2h  Actual LOS: [id number]

## 2021-05-26 NOTE — PROGRESS NOTES
Problem: Falls - Risk of  Goal: *Absence of Falls  Description: Document Samuel V[place] Fall Risk and appropriate interventions in the flowsheet.   Outcome: Progressing Towards Goal  Note: Fall Risk Interventions:  Mobility Interventions: Bed/chair exit alarm, Patient to call before getting OOB, Strengthening exercises (ROM-active/passive), Utilize walker, cane, or other assistive device         Medication Interventions: Bed/chair exit alarm, Patient to call before getting OOB, Teach patient to arise slowly    Elimination Interventions: Bed/chair exit alarm, Call light in reach, Patient to call for help with toileting needs, Toileting schedule/hourly rounds              Problem: Patient Education: Go to Patient Education Activity  Goal: Patient/Family Education  Outcome: Progressing Towards Goal     Problem: Patient Education: Go to Patient Education Activity  Goal: Patient/Family Education  Outcome: Progressing Towards Goal

## 2021-05-26 NOTE — PROGRESS NOTES
End of Shift Note    Bedside shift change report given to Al RN (oncoming nurse) by Nelson Akhtar RN (offgoing nurse). Report included the following information SBAR, Kardex, Intake/Output, MAR, Recent Results and Cardiac Rhythm . Shift worked:  Day     Shift summary and any significant changes:     ALBERTINA dark coloured stool, Ammonia still high on lactulose, H&H low, given 1 unit RBC. H&H improved post blood to 8.5 and 26.7. draw another at 2am. Continue 50mcg sandostatin     Concerns for physician to address:  none     Zone phone for oncoming shift:          Activity:  Activity Level: Bed Rest  Number times ambulated in hallways past shift: 0  Number of times OOB to chair past shift: 0    Cardiac:   Cardiac Monitoring: Yes      Cardiac Rhythm: Sinus Rhythm    Access:   Current line(s): PIV     Genitourinary:   Urinary status: voiding    Respiratory:   O2 Device: None (Room air)  Chronic home O2 use?: NO  Incentive spirometer at bedside: NO     GI:  Last Bowel Movement Date: 05/25/21  Current diet:  DIET CLEAR LIQUID  Passing flatus: YES  Tolerating current diet: YES       Pain Management:   Patient states pain is manageable on current regimen: YES    Skin:  Power Score: 15  Interventions: speciality bed, float heels and PT/OT consult    Patient Safety:  Fall Score:  Total Score: 3  Interventions: bed/chair alarm, assistive device (walker, cane, etc), gripper socks and pt to call before getting OOB  High Fall Risk: Yes    Length of Stay:  Expected LOS: 3d 2h  Actual LOS: Mike Freitas RN

## 2021-05-26 NOTE — PROGRESS NOTES
RAPID RESPONSE TEAM - follow up    Rounded on patient due to recent RRT. Discussed with primary RN. No acute concerns, VSS, MEWS 1. Visit Vitals  BP (!) 145/79 (BP 1 Location: Right upper arm, BP Patient Position: At rest)   Pulse 72   Temp 97.5 °F (36.4 °C)   Resp 13   Ht 6' 2\" (1.88 m)   Wt 156 kg (343 lb 14.7 oz)   SpO2 100%   BMI 44.16 kg/m²       No RRT interventions indicated at this time. Please call with any questions or concerns.      Adele Calixto

## 2021-05-26 NOTE — PROGRESS NOTES
Hospitalist Progress Note    NAME: Tyrel Mcqueen   :  1960   MRN:  [id number]     Admit date: 2021    Today's date: 21    PCP: Katerin De La Rosa MD      Assessment / Plan:  Acute blood less anemia POA HgB 15.5 to 6.2  Esophageal varices POA  Upper GI bleed POA  Alcohol-induced cirrhosis POA  Alcohol abuse POA  - HgB at PCPs office was low 6.2,  sent to ED  -CT abdomen/pelvis with nodular liver, suggestive of cirrhosis        No bleeding source  -EGD  with Dr Gwendlyn Duane       Large varices with red shalini stigmata and 'white nipple'                 Suggests very recent active bleeding       6 bands were placed  - s/p 4 units PRBC  - octreotide gtt, continue  -IV Protonix twice daily  - IV ceftriaxone for prophylaxis   -CIWA protocol  -Hold the Plavix and the aspirin for 7-10 days          Patient taken this because of the CVA with right-sided deficit  -Patient continues to have bowel movements with dark red blood  -Hemoglobin 7.0, has been trending down, patient still reports dark black stool, possibly old blood, unable to rule out active bleed.   Will give 1 unit of PRBC, recheck this afternoon       AMS  Hepatic encephalopathy, improving  On , he became more confused, altered, encephalopathic, improved with p.o. 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 Code Status: Full code  Surrogate Decision Maker: Wife     DVT Prophylaxis: SCD  GI Prophylaxis: not indicated     Baseline: Ambulatory at home with assistance     Likely will need SNF, PT/OT to assess. Hopefully discharge on 5/28     Subjective:     Chief Complaint / Reason for Physician Visit f/u Gi bleed  He had a rapid response yesterday for altered mental status, which is improved, had a 3 bowel movement yesterday after receiving lactulose. Reports dark black bowel movement. Review of Systems:  Symptom Y/N Comments  Symptom Y/N Comments   Fever/Chills n   Chest Pain n    Poor Appetite    Edema     Cough n   Abdominal Pain n    Sputum    Joint Pain     SOB/SHEPHERD n   Headache     Nausea/vomit n   Tolerating PT/OT     Diarrhea n   Tolerating Diet y    Constipation    Other       Could NOT obtain due to:      Objective:     VITALS:   Last 24hrs VS reviewed since prior progress note.  Most recent are:  Patient Vitals for the past 24 hrs:   Temp Pulse Resp BP SpO2   05/26/21 1104 98.5 °F (36.9 °C) 79 23 (!) 145/63 97 %   05/26/21 1103 99.1 °F (37.3 °C) 81 17 (!) 121/41 100 %   05/26/21 0757 -- -- -- -- 99 %   05/26/21 0700 98.8 °F (37.1 °C) 76 10 (!) 156/61 99 %   05/26/21 0300 98.4 °F (36.9 °C) 71 11 (!) 141/58 96 %   05/26/21 0000 -- 76 -- -- --   05/25/21 2308 98 °F (36.7 °C) 74 14 (!) 146/57 98 %   05/25/21 2000 -- 76 -- -- --   05/25/21 1930 97.6 °F (36.4 °C) 73 12 (!) 120/54 95 %   05/25/21 1756 -- 81 16 135/72 --   05/25/21 1717 97.6 °F (36.4 °C) 84 16 (!) 145/65 96 %   05/25/21 1555 -- 77 -- -- --   05/25/21 1401 97.4 °F (36.3 °C) 78 14 123/82 99 %   05/25/21 1158 -- 88 -- -- --   05/25/21 1130 -- 87 19 (!) 131/45 96 %       Intake/Output Summary (Last 24 hours) at 5/26/2021 1117  Last data filed at 5/26/2021 0954  Gross per 24 hour   Intake 1240 ml   Output --   Net 1240 ml        Wt Readings from Last 12 Encounters:   05/23/21 156 kg (343 lb 14.7 oz)   04/19/18 151.5 kg (334 lb)   09/25/17 (!) 158.8 kg (350 lb 3.2 oz)   03/27/17 (!) 163 kg (359 lb 4.8 oz)   11/28/16 (!) 167.1 kg (368 lb 6.4 oz)   06/28/16 (!) 167.4 kg (369 lb)   03/28/16 (!) 167.5 kg (369 lb 3.2 oz)   12/29/15 (!) 167.9 kg (370 lb 3.2 oz)   10/19/15 (!) 164.7 kg (363 lb)   08/27/15 (!) 160.4 kg (353 lb 9.6 oz)   05/29/15 156.5 kg (345 lb)   05/20/15 154.2 kg (340 lb)       PHYSICAL EXAM:    I had a face to face encounter and independently examined this patient on 05/26/21 as outlined below:    General: WD, WN. Alert, cooperative, no acute distress    EENT:  PERRL. Anicteric sclerae. Pale MM  Resp:  CTA bilaterally, no wheezing or rales. No accessory muscle use  CV:  Regular  rhythm,  No edema  GI:  Soft, Non distended, Non tender. +Bowel sounds, no rebound  LN:  No cervical or inguinal DANA  Neurologic:  Alert and oriented X 3, normal speech, non focal motor exam  Psych:   Good insight. Not anxious nor agitated  Skin:  No rashes. No jaundice    Reviewed most current lab test results and cultures  YES  Reviewed most current radiology test results   YES  Review and summation of old records today    NO  Reviewed patient's current orders and MAR    YES  PMH/ reviewed - no change compared to H&P  ________________________________________________________________________  Care Plan discussed with:    Comments   Patient x    Family      RN x    Care Manager     Consultant                        Multidiciplinary team rounds were held today with , nursing, pharmacist and clinical coordinator. Patient's plan of care was discussed; medications were reviewed and discharge planning was addressed.      ________________________________________________________________________      Comments   >50% of visit spent in counseling and coordination of care     ________________________________________________________________________  Iván Blair MD     Procedures: see electronic medical records for all procedures/Xrays and details which were not copied into this note but were reviewed prior to creation of Plan. LABS:  I reviewed today's most current labs and imaging studies.   Pertinent labs include:  Recent Labs     05/26/21 0347 05/25/21  1404 05/25/21  0542 05/25/21  0004 05/24/21  1355   WBC 11.9*  --   --  14.0* 17.4*   HGB 7.0*  7.1* 7.2* 7.6* 7.1* 6.5*   HCT 22.4*  22.9* 23.3* 23.8* 22.4* 21.0*     --   --  215 244     Recent Labs     05/26/21 0347 05/25/21  0004 05/24/21  0056    144 141   K 3.4* 3.7 4.2   * 110* 106   CO2 29 28 25   * 192* 158*   BUN 32* 60* 59*   CREA 1.37* 1.74* 1.95*   CA 7.8* 7.7* 7.5*   MG 1.8 1.9 1.5*   ALB 2.7* 2.6* 2.1*   TBILI 0.7 0.8 1.0   ALT 64 45 46   INR  --  1.3*  --

## 2021-05-26 NOTE — PROGRESS NOTES
RAPID RESPONSE TEAM- Follow Up    Rounded on patient due to recent CODE S. Discussed with primary RN, Stacie Sanon. No acute concerns, VSS, MEWS 0. Patient Vitals for the past 12 hrs:   Temp Pulse Resp BP SpO2   05/26/21 0300 (P) 98.4 °F (36.9 °C) 71 11 (!) 141/58 96 %   05/25/21 2308 98 °F (36.7 °C) 74 14 (!) 146/57 98 %   05/25/21 1930 97.6 °F (36.4 °C) 73 12 (!) 120/54 95 %   05/25/21 1756 -- 81 16 135/72 --   05/25/21 1717 97.6 °F (36.4 °C) 84 16 (!) 145/65 96 %   05/25/21 1555 -- 77 -- -- --          No RRT interventions indicated at this time. Please call with any questions or concerns.      Robet Shock  Rapid Response RN  Diandra Best

## 2021-05-26 NOTE — PROGRESS NOTES
0700: Bedside shift change report given to Beatris Mcdonald, RN and Segundo Cespedes, RN (oncoming nurse) by Jessica Parmar RN (offgoing nurse). Report included the following information SBAR, Kardex, Intake/Output, MAR and Recent Results. 0700: Dedra Trejo RN will be documenting on patient. 0930: Dr. Samson Lomax a bedside assessing patient. Made aware of HGB 7.0, he will place orders. 1215: Blood transfusion started. 1507: Blood transfusion completed, patient tolerated well. VSS. Will re-draw H&H in an hour    1900: I have reviewed and agree with Segundo Cespedes, RN documentation.

## 2021-05-26 NOTE — PROGRESS NOTES
GI PROGRESS NOTE  Alicia Montano, NP  [phone] NP in-hospital cell phone M-F until 4:30  After 5pm or on weekends, please call  for physician on call    Rik Flowers :1960 RTN:[id number]   ATTG: Dr Mar Emmanuel  PCP: Sivan Higgins MD  Date/Time:  2021 1318 PM     Primary GI: Dr. Chong Ashton:   Acute anemia - GI bleed - Melena- On plavix (last dose 21) - NSAID use  Hematemesis x1 - resolved  Esophageal varices with likely recent active bleeding - banded x 6. New Dx of decompensated cirrhosis - hx of ETOH abuse  Hepatic encephalopathy  - Confusion yesterday with RRT called, negative head CT, elevated ammonia at 88, and increased confusion. Today pt is doing much better A&Ox4. - Stools are normalizing, less dark and no more red blood seen. - Hgb 6.2 on admission, s/p 3 units, currently 7.0  - LFTs have mild elevation in transaminases, normal plt. CT abd/pel 21: 1. There is no visible gastrointestinal bleeding. 2. Nodular liver contour with somewhat heterogeneous parenchyma suggestive of  cirrhosis. 3. Incidental findings as above - cholelithiasis. EGD 21: 1. Large varices with red shalini stigmata and 'white nipple' sign suggesting very recent active bleeding. Six bands successfully placed with deflation of varices, including band placed directly over the 'white nipple'  2. Normal stomach, including retroflexion. Notably no gastric varices  3. Normal duodenal bulb and 2nd/3rd portion of the duodenum     ETOH abuse - current use - last drink 21  - Drinks about 3 shots of liquor daily for the last year with COVID but has drank daily for his whole life.     DMII  Metabolic acidosis  ABBEY  CVA 5 years ago - on plavix since that time     Plan:   · Advance diet as tolerated  · Continue BID PPI and Octreotide drip (hopefully can stop octreotide tomorrow - need 3-5 days of treatment)  · Started Rifaximin yesterday  · Continue lactulose - titrate to 3-4 bms per day  · Discussed ETOH abstinence - pt agreeable  · Continue Ceftriaxone 1gm daily x 7 days. · If pt rebleeds during this admission, will need to go for emergent TIPS procedures  · Hold plavix for 7 days. · Will need outpatient referral to Dr Riya Freeman. Will continue to follow. Plan discussed with Dr Darius Casarez. Subjective:   Discussed with RN events overnight. Much better than yesterday. Very pleasant and oriented. Complaint Y/N Description   Abdominal Pain n    Hematemesis n    Hematochezia n    Melena  Dark stools but less old blood per nursing   Constipation n    Diarrhea n    Dyspepsia n    Dysphagia n    Jaundiced n    Nausea/vomiting n      Review of Systems:  Symptom Y/N Comments  Symptom Y/N Comments   Fever/Chills    Chest Pain     Cough    Headaches     Sputum    Joint Pain     SOB/SHEPHERD    Pruritis/Rash     Tolerating Diet y clears  Other       Could NOT obtain due to:      Objective:   VITALS:   Last 24hrs VS reviewed since prior progress note. Most recent are:  Visit Vitals  /68 (BP Patient Position: At rest)   Pulse 72   Temp 97.6 °F (36.4 °C)   Resp 13   Ht 6' 2\" (1.88 m)   Wt 156 kg (343 lb 14.7 oz)   SpO2 100%   BMI 44.16 kg/m²       Intake/Output Summary (Last 24 hours) at 5/26/2021 1318  Last data filed at 5/26/2021 1314  Gross per 24 hour   Intake 1190 ml   Output 500 ml   Net 690 ml     PHYSICAL EXAM:  General: WD, obese. Alert, cooperative, no acute distress. HEENT: NC, Atraumatic. Anicteric sclerae. Lungs:  CTA Bilaterally. No Wheezing/Rhonchi/Rales. Heart:  Regular  rhythm,  No murmur, No Rubs, No Gallops. Abdomen: Soft, Non-distended, Non tender.  +Bowel sounds, no HSM  Extremities: BLE +1 edema  Neurologic:  Alert and oriented X 3. Chronic slowed speech  Psych:   Good insight. Not anxious nor agitated.     Lab and Radiology Data Reviewed: (see below)    Medications Reviewed: (see below)  PMH/SH reviewed - no change compared to H&P  ________________________________________________________________________  Total time spent with patient: 20 minutes ________________________________________________________________________  Care Plan discussed with:  Patient y   Family     RN santos Gasca and Joya              Consultant: Dariusz Mandujano NP     Procedures: see electronic medical records for all procedures/Xrays and details which were not copied into this note but were reviewed prior to creation of Plan. LABS:  Recent Labs     05/26/21 0347 05/25/21  1404 05/25/21  0004   WBC 11.9*  --  14.0*   HGB 7.0*  7.1* 7.2* 7.1*   HCT 22.4*  22.9* 23.3* 22.4*     --  215     Recent Labs     05/26/21 0347 05/25/21  0004 05/24/21  0056    144 141   K 3.4* 3.7 4.2   * 110* 106   CO2 29 28 25   BUN 32* 60* 59*   CREA 1.37* 1.74* 1.95*   * 192* 158*   CA 7.8* 7.7* 7.5*   MG 1.8 1.9 1.5*     Recent Labs     05/26/21 0347 05/25/21  0004 05/24/21  0056   * 184* 206*   TP 7.0 6.6 6.2*   ALB 2.7* 2.6* 2.1*   GLOB 4.3* 4.0 4.1*     Recent Labs     05/25/21  0004   INR 1.3*   PTP 13.1*      Recent Labs     05/24/21  0056   TIBC 301   PSAT 24   FERR 21*      Lab Results   Component Value Date/Time    Folate 18.0 05/24/2021 12:56 AM     No results for input(s): PH, PCO2, PO2 in the last 72 hours. No results for input(s): CPK, CKMB in the last 72 hours.     No lab exists for component: TROPONINI  Lab Results   Component Value Date/Time    Color DARK YELLOW 05/28/2014 06:47 PM    Appearance CLEAR 05/28/2014 06:47 PM    Specific gravity >1.030 (H) 05/28/2014 06:47 PM    pH (UA) 5.0 05/28/2014 06:47 PM    Protein 100 (A) 05/28/2014 06:47 PM    Glucose >1,000 (A) 05/28/2014 06:47 PM    Ketone TRACE (A) 05/28/2014 06:47 PM    Bilirubin SMALL (A) 05/28/2014 06:47 PM    Urobilinogen 1.0 05/28/2014 06:47 PM    Nitrites NEGATIVE  05/28/2014 06:47 PM    Leukocyte Esterase NEGATIVE  05/28/2014 06:47 PM    Epithelial cells FEW 05/28/2014 06:47 PM    Bacteria NEGATIVE  05/28/2014 06:47 PM    WBC 0-4 05/28/2014 06:47 PM    RBC 5-10 05/28/2014 06:47 PM       MEDICATIONS:  Current Facility-Administered Medications   Medication Dose Route Frequency    lactulose (CHRONULAC) 10 gram/15 mL solution 30 mL  20 g Oral TID    rifAXIMin (XIFAXAN) tablet 550 mg  550 mg Oral BID    0.9% sodium chloride infusion 250 mL  250 mL IntraVENous PRN    metoprolol tartrate (LOPRESSOR) tablet 50 mg  50 mg Oral BID    0.9% sodium chloride infusion 250 mL  250 mL IntraVENous PRN    sodium chloride (NS) flush 5-40 mL  5-40 mL IntraVENous Q8H    sodium chloride (NS) flush 5-40 mL  5-40 mL IntraVENous PRN    octreotide (SANDOSTATIN) 500 mcg in 0.9% sodium chloride 500 mL infusion  50 mcg/hr IntraVENous CONTINUOUS    0.9% sodium chloride infusion 250 mL  250 mL IntraVENous PRN    cefTRIAXone (ROCEPHIN) 1 g in 0.9% sodium chloride (MBP/ADV) 50 mL MBP  1 g IntraVENous Q24H    insulin lispro (HUMALOG) injection 4 Units  4 Units SubCUTAneous TIDAC    And    insulin glargine (LANTUS) injection 67 Units  67 Units SubCUTAneous DAILY    [START ON 5/27/2021] thiamine mononitrate (B-1) tablet 100 mg  100 mg Oral DAILY    pantoprazole (PROTONIX) 40 mg in 0.9% sodium chloride 10 mL injection  40 mg IntraVENous Q12H    0.9% sodium chloride infusion  50 mL/hr IntraVENous CONTINUOUS    acetaminophen (TYLENOL) tablet 650 mg  650 mg Oral Q6H PRN    Or    acetaminophen (TYLENOL) suppository 650 mg  650 mg Rectal Q6H PRN    polyethylene glycol (MIRALAX) packet 17 g  17 g Oral DAILY PRN    ondansetron (ZOFRAN ODT) tablet 4 mg  4 mg Oral Q8H PRN    Or    ondansetron (ZOFRAN) injection 4 mg  4 mg IntraVENous Q6H PRN    atorvastatin (LIPITOR) tablet 10 mg  10 mg Oral QHS    cholecalciferol (VITAMIN D3) (1000 Units /25 mcg) tablet 1,000 Units  1,000 Units Oral DAILY    hydroCHLOROthiazide (HYDRODIURIL) tablet 12.5 mg  12.5 mg Oral DAILY    [Held by provider] gabapentin (NEURONTIN) capsule 300 mg  300 mg Oral TID    amLODIPine (NORVASC) tablet 10 mg  10 mg Oral DAILY    insulin lispro (HUMALOG) injection   SubCUTAneous AC&HS    glucose chewable tablet 16 g  4 Tablet Oral PRN    dextrose (D50W) injection syrg 12.5-25 g  12.5-25 g IntraVENous PRN    glucagon (GLUCAGEN) injection 1 mg  1 mg IntraMUSCular PRN    LORazepam (ATIVAN) tablet 2 mg  2 mg Oral Q4H PRN

## 2021-05-27 LAB
ABO + RH BLD: NORMAL
ANION GAP SERPL CALC-SCNC: 4 MMOL/L (ref 5–15)
BLD PROD TYP BPU: NORMAL
BLOOD GROUP ANTIBODIES SERPL: NORMAL
BPU ID: NORMAL
BUN SERPL-MCNC: 17 MG/DL (ref 6–20)
BUN/CREAT SERPL: 16 (ref 12–20)
CALCIUM SERPL-MCNC: 8 MG/DL (ref 8.5–10.1)
CHLORIDE SERPL-SCNC: 104 MMOL/L (ref 97–108)
CO2 SERPL-SCNC: 28 MMOL/L (ref 21–32)
CREAT SERPL-MCNC: 1.06 MG/DL (ref 0.7–1.3)
CROSSMATCH RESULT,%XM: NORMAL
GLUCOSE BLD STRIP.AUTO-MCNC: 112 MG/DL (ref 65–117)
GLUCOSE BLD STRIP.AUTO-MCNC: 139 MG/DL (ref 65–117)
GLUCOSE BLD STRIP.AUTO-MCNC: 144 MG/DL (ref 65–117)
GLUCOSE BLD STRIP.AUTO-MCNC: 187 MG/DL (ref 65–117)
GLUCOSE SERPL-MCNC: 117 MG/DL (ref 65–100)
HCT VFR BLD AUTO: 24.8 % (ref 36.6–50.3)
HCT VFR BLD AUTO: 26.6 % (ref 36.6–50.3)
HGB BLD-MCNC: 7.9 G/DL (ref 12.1–17)
HGB BLD-MCNC: 8.4 G/DL (ref 12.1–17)
INR PPP: 1.2 (ref 0.9–1.1)
MAGNESIUM SERPL-MCNC: 1.6 MG/DL (ref 1.6–2.4)
POTASSIUM SERPL-SCNC: 3.1 MMOL/L (ref 3.5–5.1)
PROTHROMBIN TIME: 12.3 SEC (ref 9–11.1)
SERVICE CMNT-IMP: ABNORMAL
SERVICE CMNT-IMP: NORMAL
SODIUM SERPL-SCNC: 136 MMOL/L (ref 136–145)
SPECIMEN EXP DATE BLD: NORMAL
STATUS OF UNIT,%ST: NORMAL
UNIT DIVISION, %UDIV: 0

## 2021-05-27 PROCEDURE — 97166 OT EVAL MOD COMPLEX 45 MIN: CPT | Performed by: OCCUPATIONAL THERAPIST

## 2021-05-27 PROCEDURE — 97530 THERAPEUTIC ACTIVITIES: CPT

## 2021-05-27 PROCEDURE — 97162 PT EVAL MOD COMPLEX 30 MIN: CPT

## 2021-05-27 PROCEDURE — 74011000250 HC RX REV CODE- 250: Performed by: STUDENT IN AN ORGANIZED HEALTH CARE EDUCATION/TRAINING PROGRAM

## 2021-05-27 PROCEDURE — 82962 GLUCOSE BLOOD TEST: CPT

## 2021-05-27 PROCEDURE — 74011250637 HC RX REV CODE- 250/637: Performed by: INTERNAL MEDICINE

## 2021-05-27 PROCEDURE — 65270000029 HC RM PRIVATE

## 2021-05-27 PROCEDURE — 85610 PROTHROMBIN TIME: CPT

## 2021-05-27 PROCEDURE — 74011250636 HC RX REV CODE- 250/636: Performed by: INTERNAL MEDICINE

## 2021-05-27 PROCEDURE — 85014 HEMATOCRIT: CPT

## 2021-05-27 PROCEDURE — 74011636637 HC RX REV CODE- 636/637: Performed by: STUDENT IN AN ORGANIZED HEALTH CARE EDUCATION/TRAINING PROGRAM

## 2021-05-27 PROCEDURE — C9113 INJ PANTOPRAZOLE SODIUM, VIA: HCPCS | Performed by: STUDENT IN AN ORGANIZED HEALTH CARE EDUCATION/TRAINING PROGRAM

## 2021-05-27 PROCEDURE — 97530 THERAPEUTIC ACTIVITIES: CPT | Performed by: OCCUPATIONAL THERAPIST

## 2021-05-27 PROCEDURE — 36415 COLL VENOUS BLD VENIPUNCTURE: CPT

## 2021-05-27 PROCEDURE — 74011636637 HC RX REV CODE- 636/637: Performed by: INTERNAL MEDICINE

## 2021-05-27 PROCEDURE — 83735 ASSAY OF MAGNESIUM: CPT

## 2021-05-27 PROCEDURE — 80048 BASIC METABOLIC PNL TOTAL CA: CPT

## 2021-05-27 PROCEDURE — 74011000258 HC RX REV CODE- 258: Performed by: INTERNAL MEDICINE

## 2021-05-27 PROCEDURE — 74011250636 HC RX REV CODE- 250/636: Performed by: STUDENT IN AN ORGANIZED HEALTH CARE EDUCATION/TRAINING PROGRAM

## 2021-05-27 PROCEDURE — 74011250637 HC RX REV CODE- 250/637: Performed by: STUDENT IN AN ORGANIZED HEALTH CARE EDUCATION/TRAINING PROGRAM

## 2021-05-27 RX ADMIN — LACTULOSE 30 ML: 20 SOLUTION ORAL at 21:44

## 2021-05-27 RX ADMIN — ATORVASTATIN CALCIUM 10 MG: 10 TABLET, FILM COATED ORAL at 21:44

## 2021-05-27 RX ADMIN — Medication 10 ML: at 05:01

## 2021-05-27 RX ADMIN — INSULIN LISPRO 4 UNITS: 100 INJECTION, SOLUTION INTRAVENOUS; SUBCUTANEOUS at 12:39

## 2021-05-27 RX ADMIN — INSULIN LISPRO 2 UNITS: 100 INJECTION, SOLUTION INTRAVENOUS; SUBCUTANEOUS at 12:40

## 2021-05-27 RX ADMIN — INSULIN LISPRO 4 UNITS: 100 INJECTION, SOLUTION INTRAVENOUS; SUBCUTANEOUS at 08:43

## 2021-05-27 RX ADMIN — INSULIN GLARGINE 67 UNITS: 100 INJECTION, SOLUTION SUBCUTANEOUS at 08:44

## 2021-05-27 RX ADMIN — Medication 1000 UNITS: at 08:45

## 2021-05-27 RX ADMIN — ACETAMINOPHEN 650 MG: 325 TABLET ORAL at 21:44

## 2021-05-27 RX ADMIN — SODIUM CHLORIDE 50 MCG/HR: 9 INJECTION, SOLUTION INTRAVENOUS at 00:57

## 2021-05-27 RX ADMIN — SODIUM CHLORIDE 40 MG: 9 INJECTION, SOLUTION INTRAMUSCULAR; INTRAVENOUS; SUBCUTANEOUS at 08:46

## 2021-05-27 RX ADMIN — LACTULOSE 30 ML: 20 SOLUTION ORAL at 08:46

## 2021-05-27 RX ADMIN — METOPROLOL TARTRATE 50 MG: 50 TABLET, FILM COATED ORAL at 21:44

## 2021-05-27 RX ADMIN — SODIUM CHLORIDE 40 MG: 9 INJECTION, SOLUTION INTRAMUSCULAR; INTRAVENOUS; SUBCUTANEOUS at 21:44

## 2021-05-27 RX ADMIN — Medication 10 ML: at 17:01

## 2021-05-27 RX ADMIN — INSULIN LISPRO 4 UNITS: 100 INJECTION, SOLUTION INTRAVENOUS; SUBCUTANEOUS at 17:00

## 2021-05-27 RX ADMIN — RIFAXIMIN 550 MG: 550 TABLET ORAL at 08:46

## 2021-05-27 RX ADMIN — RIFAXIMIN 550 MG: 550 TABLET ORAL at 18:00

## 2021-05-27 RX ADMIN — Medication 100 MG: at 08:46

## 2021-05-27 RX ADMIN — INSULIN LISPRO 2 UNITS: 100 INJECTION, SOLUTION INTRAVENOUS; SUBCUTANEOUS at 08:44

## 2021-05-27 RX ADMIN — CEFTRIAXONE 1 G: 1 INJECTION, POWDER, FOR SOLUTION INTRAMUSCULAR; INTRAVENOUS at 21:44

## 2021-05-27 RX ADMIN — HYDROCHLOROTHIAZIDE 12.5 MG: 25 TABLET ORAL at 08:45

## 2021-05-27 RX ADMIN — Medication 10 ML: at 21:44

## 2021-05-27 RX ADMIN — METOPROLOL TARTRATE 50 MG: 50 TABLET, FILM COATED ORAL at 08:44

## 2021-05-27 RX ADMIN — AMLODIPINE BESYLATE 10 MG: 5 TABLET ORAL at 08:44

## 2021-05-27 NOTE — PROGRESS NOTES
RAPID RESPONSE TEAM- Follow Up    Rounded on patient due to recent rapid response. Discussed with primary RN, Al. No acute concerns, VSS, MEWS 1. Patient Vitals for the past 12 hrs:   Temp Pulse Resp BP SpO2   05/26/21 2239 98 °F (36.7 °C) 66 13 (!) 142/67 100 %   05/26/21 1916 97.8 °F (36.6 °C) 75 15 (!) 153/74 100 %   05/26/21 1500 97.5 °F (36.4 °C) 72 13 (!) 145/79 100 %   05/26/21 1400 98.4 °F (36.9 °C) 75 15 (!) 142/66 100 %   05/26/21 1330 97.9 °F (36.6 °C) 72 19 137/80 100 %   05/26/21 1300 97.6 °F (36.4 °C) 72 13 123/68 100 %   05/26/21 1230 98 °F (36.7 °C) 72 18 129/69 99 %   05/26/21 1215 97.7 °F (36.5 °C) 71 13 (!) 143/66 100 %          No RRT interventions indicated at this time. Please call with any questions or concerns.      Luis Antonio Pittman  Rapid Response RN  Sunita Newman

## 2021-05-27 NOTE — PROGRESS NOTES
Transition of Care Plan:     RUR:  15%     Disposition: Home with wife with home health services vs snf      Follow up appointments: To be made prior to discharge if going home.      DME needed: To be determined.      Transportation at Discharge: Family vs BLS.    [place] or means to access home:  Wife has keys. IM Medicare letter: To be given prior to discharge.      Caregiver Contact: Wife (Kwesi Fabian)     Discharge Caregiver contacted prior to discharge? Caregiver to be contacted prior to discharge. Spoke with patient and his wife and they are both agreeable to snf when medically stable . [facility] first choice is [facility] and [facility] second choice is [facility]. Spoke to Lisa Will at [facility] and informed her of the referral sent to them. She will review. Patient has [employer] and will need insurance auth. MD wrote pt/ot orders today and called inpatient physiscal therapy department and left message to see the patient today so auth process can be started when facility accepts patient. Dora Cobb  RN BSN CRM        [phone]

## 2021-05-27 NOTE — PROGRESS NOTES
Hospitalist Progress Note    NAME: Rebekah Slaughter   :  1960   MRN:  [id number]     Admit date: 2021    Today's date: 21    PCP: Whit Simpson MD      Assessment / Plan:  Acute blood less anemia POA HgB 15.5 to 6.2  Esophageal varices POA  Upper GI bleed POA  Alcohol-induced cirrhosis POA  Alcohol abuse POA  - HgB at PCPs office was low 6.2,  sent to ED  -CT abdomen/pelvis with nodular liver, suggestive of cirrhosis        No bleeding source  -EGD  with Dr Alea Lewis       Large varices with red shalini stigmata and 'white nipple'                 Suggests very recent active bleeding       6 bands were placed  - s/p 5 units PRBC  - octreotide gtt, continue  -IV Protonix twice daily  - IV ceftriaxone for prophylaxis   -CIWA protocol  -Hold the Plavix and the aspirin for 7-10 days          Patient taken this because of the CVA with right-sided deficit    -Should be able to discontinue octreotide drip today, hemoglobin remained stable after 1 unit yesterday, had some dark stool, probably old blood, continue to monitor hemoglobin      AMS  Hepatic encephalopathy, improving  On , he became more confused, altered, encephalopathic, improved with p.o. 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     Code Status: Full code  Surrogate Decision Maker: Wife     DVT Prophylaxis: SCD  GI Prophylaxis: not indicated     Baseline: Ambulatory at home with assistance     Likely will need SNF, PT/OT to assess. Hopefully discharge on 5/28-5/29     Subjective:     Chief Complaint / Reason for Physician Visit f/u Gi bleed  -Alert oriented x3, had multiple bowel movements, due to lactulose. Currently on octreotide drip. Review of Systems:  Symptom Y/N Comments  Symptom Y/N Comments   Fever/Chills n   Chest Pain n    Poor Appetite    Edema     Cough n   Abdominal Pain n    Sputum    Joint Pain     SOB/SHEPHERD n   Headache     Nausea/vomit n   Tolerating PT/OT     Diarrhea n   Tolerating Diet y    Constipation    Other       Could NOT obtain due to:      Objective:     VITALS:   Last 24hrs VS reviewed since prior progress note.  Most recent are:  Patient Vitals for the past 24 hrs:   Temp Pulse Resp BP SpO2   05/27/21 0737 97.2 °F (36.2 °C) 74 17 127/62 100 %   05/27/21 0300 98.3 °F (36.8 °C) 67 12 128/65 98 %   05/26/21 2239 98 °F (36.7 °C) 66 13 (!) 142/67 100 %   05/26/21 1916 97.8 °F (36.6 °C) 75 15 (!) 153/74 100 %   05/26/21 1500 97.5 °F (36.4 °C) 72 13 (!) 145/79 100 %   05/26/21 1400 98.4 °F (36.9 °C) 75 15 (!) 142/66 100 %   05/26/21 1330 97.9 °F (36.6 °C) 72 19 137/80 100 %   05/26/21 1300 97.6 °F (36.4 °C) 72 13 123/68 100 %   05/26/21 1230 98 °F (36.7 °C) 72 18 129/69 99 %   05/26/21 1215 97.7 °F (36.5 °C) 71 13 (!) 143/66 100 %   05/26/21 1200 98.1 °F (36.7 °C) 72 17 135/61 100 %   05/26/21 1104 98.5 °F (36.9 °C) 79 23 (!) 145/63 97 %   05/26/21 1103 99.1 °F (37.3 °C) 81 17 (!) 121/41 100 %       Intake/Output Summary (Last 24 hours) at 5/27/2021 1023  Last data filed at 5/26/2021 1515  Gross per 24 hour   Intake 767.1 ml   Output 1590 ml   Net -822.9 ml        Wt Readings from Last 12 Encounters:   05/23/21 156 kg (343 lb 14.7 oz)   04/19/18 151.5 kg (334 lb)   09/25/17 (!) 158.8 kg (350 lb 3.2 oz)   03/27/17 (!) 163 kg (359 lb 4.8 oz)   11/28/16 (!) 167.1 kg (368 lb 6.4 oz) 06/28/16 (!) 167.4 kg (369 lb)   03/28/16 (!) 167.5 kg (369 lb 3.2 oz)   12/29/15 (!) 167.9 kg (370 lb 3.2 oz)   10/19/15 (!) 164.7 kg (363 lb)   08/27/15 (!) 160.4 kg (353 lb 9.6 oz)   05/29/15 156.5 kg (345 lb)   05/20/15 154.2 kg (340 lb)       PHYSICAL EXAM:    I had a face to face encounter and independently examined this patient on 05/27/21 as outlined below:    General: WD, WN. Alert, cooperative, no acute distress    EENT:  PERRL. Anicteric sclerae. Pale MM  Resp:  CTA bilaterally, no wheezing or rales. No accessory muscle use  CV:  Regular  rhythm,  No edema  GI:  Soft, Non distended, Non tender. +Bowel sounds, no rebound  LN:  No cervical or inguinal DANA  Neurologic:  Alert and oriented X 3, normal speech, non focal motor exam  Psych:   Good insight. Not anxious nor agitated  Skin:  No rashes. No jaundice    Reviewed most current lab test results and cultures  YES  Reviewed most current radiology test results   YES  Review and summation of old records today    NO  Reviewed patient's current orders and MAR    YES  PMH/SH reviewed - no change compared to H&P  ________________________________________________________________________  Care Plan discussed with:    Comments   Patient x    Family      RN x    Care Manager     Consultant                        Multidiciplinary team rounds were held today with , nursing, pharmacist and clinical coordinator. Patient's plan of care was discussed; medications were reviewed and discharge planning was addressed. ________________________________________________________________________      Comments   >50% of visit spent in counseling and coordination of care     ________________________________________________________________________  Kavita Villeda MD     Procedures: see electronic medical records for all procedures/Xrays and details which were not copied into this note but were reviewed prior to creation of Plan.       LABS:  I reviewed today's most current labs and imaging studies.   Pertinent labs include:  Recent Labs     05/27/21 0314 05/26/21  1544 05/26/21 0347 05/25/21  0004 05/24/21  1355   WBC  --   --  11.9* 14.0* 17.4*   HGB 7.9* 8.5* 7.0*  7.1* 7.1* 6.5*   HCT 24.8* 26.7* 22.4*  22.9* 22.4* 21.0*   PLT  --   --  179 215 244     Recent Labs     05/27/21 0314 05/26/21 0347 05/25/21  0004    144 144   K 3.1* 3.4* 3.7    111* 110*   CO2 28 29 28   * 150* 192*   BUN 17 32* 60*   CREA 1.06 1.37* 1.74*   CA 8.0* 7.8* 7.7*   MG 1.6 1.8 1.9   ALB  --  2.7* 2.6*   TBILI  --  0.7 0.8   ALT  --  64 45   INR 1.2*  --  1.3*

## 2021-05-27 NOTE — ROUTINE PROCESS
End of Shift Note    Bedside shift change report given to Carlota Baptiste (oncoming nurse) by Sigrid Kilpatrick (offgoing nurse). Report included the following information SBAR, Kardex, ED Summary, Intake/Output, MAR, Recent Results, Med Rec Status, Cardiac Rhythm Simus Rhythm/ tachy, Alarm Parameters , Quality Measures and Dual Neuro Assessment    Shift worked:  Night     Shift summary and any significant changes: On room and VS stable during shift. H&H 7.9 & 24.8  Q12 and next due at 1500. Concerns for physician to address:  . ... Zone phone for oncoming shift:   . ... Activity:  Activity Level: Bed Rest  Number times ambulated in hallways past shift: 0  Number of times OOB to chair past shift: 0    Cardiac:   Cardiac Monitoring: Yes      Cardiac Rhythm: Sinus Rhythm    Access:   Current line(s): PIV     Genitourinary:   Urinary status: voiding and incontinent    Respiratory:   O2 Device: None (Room air)  Chronic home O2 use?: NO  Incentive spirometer at bedside: YES     GI:  Last Bowel Movement Date: 05/27/21  Current diet:  DIET CLEAR LIQUID  Passing flatus: YES  Tolerating current diet: YES       Pain Management:   Patient states pain is manageable on current regimen: YES    Skin:  Power Score: 15  Interventions: speciality bed and PT/OT consult    Patient Safety:  Fall Score:  Total Score: 3  Interventions: bed/chair alarm, gripper socks and pt to call before getting OOB  High Fall Risk: Yes    Length of Stay:  Expected LOS: 3d 2h  Actual LOS: [facility]

## 2021-05-27 NOTE — PROGRESS NOTES
0700: Bedside shift change report given to Amber Raza RN (oncoming nurse) by Maria Fernanda Peacock (offgoing nurse). Report included the following information SBAR, Kardex, Intake/Output and Cardiac Rhythm NSR-tach. TRANSFER - OUT REPORT:    Verbal report given to [facility], RN(name) on Alissa Mcfarland  being transferred to [facility](unit) for routine progression of care       Report consisted of patients Situation, Background, Assessment and   Recommendations(SBAR). Information from the following report(s) SBAR, Kardex, Intake/Output, MAR and Cardiac Rhythm NSR was reviewed with the receiving nurse. Lines:   Peripheral IV 05/23/21 Posterior;Right Forearm (Active)   Site Assessment Clean, dry, & intact 05/27/21 1526   Phlebitis Assessment 0 05/27/21 1526   Infiltration Assessment 0 05/27/21 1526   Dressing Status Clean, dry, & intact 05/27/21 1526   Dressing Type Transparent 05/27/21 1526   Hub Color/Line Status Pink; Infusing 05/27/21 1526   Action Taken Open ports on tubing capped 05/27/21 1526   Alcohol Cap Used Yes 05/27/21 1526       Peripheral IV 05/24/21 Anterior;Right Forearm (Active)   Site Assessment Clean, dry, & intact 05/27/21 1526   Phlebitis Assessment 0 05/27/21 1526   Infiltration Assessment 0 05/27/21 1526   Dressing Status Clean, dry, & intact 05/27/21 1526   Dressing Type Transparent 05/27/21 1526   Hub Color/Line Status Blue;Capped 05/27/21 1526   Action Taken Open ports on tubing capped 05/27/21 1526   Alcohol Cap Used Yes 05/27/21 1526        Opportunity for questions and clarification was provided.       Patient transported with:   Registered Nurse  Tech

## 2021-05-27 NOTE — PROGRESS NOTES
GI PROGRESS NOTE  Suzette Boyd NP  [phone] NP in-hospital cell phone M-F until 4:30  After 5pm or on weekends, please call  for physician on call    Julio Warren :1960 UAS:[id number]   ATTG: Dr Grey Ott  PCP: Ksenia Vargas MD  Date/Time:  2021 1318 PM     Primary GI: Dr. Street Blind:   Acute anemia - GI bleed - Melena- On plavix (last dose 21) - NSAID use  Hematemesis x1 - resolved  Esophageal varices with likely recent active bleeding - banded x 6. New Dx of decompensated cirrhosis - hx of ETOH abuse  Hepatic encephalopathy  - Confusion yesterday with RRT called, negative head CT, elevated ammonia at 88, and increased confusion. Today pt is doing much better A&Ox4. - Stools are normalizing, less dark and no more red blood seen. - Hgb 6.2 on admission, s/p 3 units, currently 7.0  - LFTs have mild elevation in transaminases, normal plt. CT abd/pel 21: 1. There is no visible gastrointestinal bleeding. 2. Nodular liver contour with somewhat heterogeneous parenchyma suggestive of  cirrhosis. 3. Incidental findings as above - cholelithiasis. EGD 21: 1. Large varices with red shalini stigmata and 'white nipple' sign suggesting very recent active bleeding. Six bands successfully placed with deflation of varices, including band placed directly over the 'white nipple'  2. Normal stomach, including retroflexion. Notably no gastric varices  3. Normal duodenal bulb and 2nd/3rd portion of the duodenum     ETOH abuse - current use - last drink 21  - Drinks about 3 shots of liquor daily for the last year with COVID but has drank daily for his whole life. DMII  Metabolic acidosis  ABBEY  CVA 5 years ago - on plavix since that time     Plan:   -Continue BID PPI  -Octreotide drip can stop at 6pm today  -Continue Rifaximin  -Continue lactulose - titrate to 3-4 bms per day  -Encouraged ETOH abstinence  -Continue Ceftriaxone 1gm daily x 7 days.    -If pt rebleeds during this admission, will need to go for emergent TIPS procedures  -Hold plavix for 7 days, total  -Will need outpatient referral to Dr Adrienne Power. Will continue to follow. Plan discussed with Dr Sherri Freire. Subjective:   Discussed with RN events overnight. Still having dark stool, hemoglobin with mild drop today. Patient resting comfortably in bed during visit. Reports feels great! No abdominal pain, no nausea or vomiting. Tolerating diet. Complaint Y/N Description   Abdominal Pain n    Hematemesis n    Hematochezia n    Melena  Dark stools but less old blood per nursing   Constipation n    Diarrhea n    Dyspepsia n    Dysphagia n    Jaundiced n    Nausea/vomiting n      Review of Systems:  Symptom Y/N Comments  Symptom Y/N Comments   Fever/Chills    Chest Pain     Cough    Headaches     Sputum    Joint Pain     SOB/SHEPHERD    Pruritis/Rash     Tolerating Diet y   Other       Could NOT obtain due to:      Objective:   VITALS:   Last 24hrs VS reviewed since prior progress note. Most recent are:  Visit Vitals  /68 (BP 1 Location: Right upper arm, BP Patient Position: At rest)   Pulse 71   Temp 98.2 °F (36.8 °C)   Resp 18   Ht 6' 2\" (1.88 m)   Wt 156 kg (343 lb 14.7 oz)   SpO2 97%   BMI 44.16 kg/m²       Intake/Output Summary (Last 24 hours) at 5/27/2021 1254  Last data filed at 5/26/2021 1515  Gross per 24 hour   Intake 367.1 ml   Output 1590 ml   Net -1222.9 ml     PHYSICAL EXAM:  General: WD, obese. Alert, cooperative, no acute distress. HEENT: NC, Atraumatic. Anicteric sclerae. Lungs:  CTA Bilaterally. No Wheezing/Rhonchi/Rales. Heart:  Regular  rhythm,  No murmur, No Rubs, No Gallops. Abdomen: Soft, Non-distended, Non tender.  +Bowel sounds, no HSM  Extremities: BLE +1 edema  Neurologic:  Alert and oriented X 3. Chronic slowed speech  Psych:   Good insight. Not anxious nor agitated.     Lab and Radiology Data Reviewed: (see below)    Medications Reviewed: (see below)  PMH/SH reviewed - no change compared to H&P  ________________________________________________________________________  Total time spent with patient: 20 minutes ________________________________________________________________________  Care Plan discussed with:  Patient y   Family     RN santos Peralta              Consultant:       Bisi Fong NP     Procedures: see electronic medical records for all procedures/Xrays and details which were not copied into this note but were reviewed prior to creation of Plan. LABS:  Recent Labs     05/27/21 0314 05/26/21 1544 05/26/21 0347 05/25/21  0004   WBC  --   --  11.9* 14.0*   HGB 7.9* 8.5* 7.0*  7.1* 7.1*   HCT 24.8* 26.7* 22.4*  22.9* 22.4*   PLT  --   --  179 215     Recent Labs     05/27/21 0314 05/26/21 0347 05/25/21  0004    144 144   K 3.1* 3.4* 3.7    111* 110*   CO2 28 29 28   BUN 17 32* 60*   CREA 1.06 1.37* 1.74*   * 150* 192*   CA 8.0* 7.8* 7.7*   MG 1.6 1.8 1.9     Recent Labs     05/26/21 0347 05/25/21  0004   * 184*   TP 7.0 6.6   ALB 2.7* 2.6*   GLOB 4.3* 4.0     Recent Labs     05/27/21 0314 05/25/21  0004   INR 1.2* 1.3*   PTP 12.3* 13.1*      No results for input(s): FE, TIBC, PSAT, FERR in the last 72 hours. Lab Results   Component Value Date/Time    Folate 18.0 05/24/2021 12:56 AM     No results for input(s): PH, PCO2, PO2 in the last 72 hours. No results for input(s): CPK, CKMB in the last 72 hours.     No lab exists for component: TROPONINI  Lab Results   Component Value Date/Time    Color DARK YELLOW 05/28/2014 06:47 PM    Appearance CLEAR 05/28/2014 06:47 PM    Specific gravity >1.030 (H) 05/28/2014 06:47 PM    pH (UA) 5.0 05/28/2014 06:47 PM    Protein 100 (A) 05/28/2014 06:47 PM    Glucose >1,000 (A) 05/28/2014 06:47 PM    Ketone TRACE (A) 05/28/2014 06:47 PM    Bilirubin SMALL (A) 05/28/2014 06:47 PM    Urobilinogen 1.0 05/28/2014 06:47 PM    Nitrites NEGATIVE  05/28/2014 06:47 PM    Leukocyte Esterase NEGATIVE  05/28/2014 06:47 PM    Epithelial cells FEW 05/28/2014 06:47 PM    Bacteria NEGATIVE  05/28/2014 06:47 PM    WBC 0-4 05/28/2014 06:47 PM    RBC 5-10 05/28/2014 06:47 PM       MEDICATIONS:  Current Facility-Administered Medications   Medication Dose Route Frequency    lactulose (CHRONULAC) 10 gram/15 mL solution 30 mL  20 g Oral BID    rifAXIMin (XIFAXAN) tablet 550 mg  550 mg Oral BID    0.9% sodium chloride infusion 250 mL  250 mL IntraVENous PRN    metoprolol tartrate (LOPRESSOR) tablet 50 mg  50 mg Oral BID    0.9% sodium chloride infusion 250 mL  250 mL IntraVENous PRN    sodium chloride (NS) flush 5-40 mL  5-40 mL IntraVENous Q8H    sodium chloride (NS) flush 5-40 mL  5-40 mL IntraVENous PRN    octreotide (SANDOSTATIN) 500 mcg in 0.9% sodium chloride 500 mL infusion  50 mcg/hr IntraVENous CONTINUOUS    0.9% sodium chloride infusion 250 mL  250 mL IntraVENous PRN    cefTRIAXone (ROCEPHIN) 1 g in 0.9% sodium chloride (MBP/ADV) 50 mL MBP  1 g IntraVENous Q24H    insulin lispro (HUMALOG) injection 4 Units  4 Units SubCUTAneous TIDAC    And    insulin glargine (LANTUS) injection 67 Units  67 Units SubCUTAneous DAILY    thiamine mononitrate (B-1) tablet 100 mg  100 mg Oral DAILY    pantoprazole (PROTONIX) 40 mg in 0.9% sodium chloride 10 mL injection  40 mg IntraVENous Q12H    acetaminophen (TYLENOL) tablet 650 mg  650 mg Oral Q6H PRN    Or    acetaminophen (TYLENOL) suppository 650 mg  650 mg Rectal Q6H PRN    polyethylene glycol (MIRALAX) packet 17 g  17 g Oral DAILY PRN    ondansetron (ZOFRAN ODT) tablet 4 mg  4 mg Oral Q8H PRN    Or    ondansetron (ZOFRAN) injection 4 mg  4 mg IntraVENous Q6H PRN    atorvastatin (LIPITOR) tablet 10 mg  10 mg Oral QHS    cholecalciferol (VITAMIN D3) (1000 Units /25 mcg) tablet 1,000 Units  1,000 Units Oral DAILY    hydroCHLOROthiazide (HYDRODIURIL) tablet 12.5 mg  12.5 mg Oral DAILY    [Held by provider] gabapentin (NEURONTIN) capsule 300 mg  300 mg Oral TID    amLODIPine (NORVASC) tablet 10 mg  10 mg Oral DAILY    insulin lispro (HUMALOG) injection   SubCUTAneous AC&HS    glucose chewable tablet 16 g  4 Tablet Oral PRN    dextrose (D50W) injection syrg 12.5-25 g  12.5-25 g IntraVENous PRN    glucagon (GLUCAGEN) injection 1 mg  1 mg IntraMUSCular PRN    LORazepam (ATIVAN) tablet 2 mg  2 mg Oral Q4H PRN

## 2021-05-27 NOTE — PROGRESS NOTES
Problem: Self Care Deficits Care Plan (Adult)  Goal: *Acute Goals and Plan of Care (Insert Text)  Description: FUNCTIONAL STATUS PRIOR TO ADMISSION: Patient is a poor historian, giving inconsistent responses to history. RN indicates that his wife assist with ADL, but not sure to what extent. HOME SUPPORT: Lives with wife. Patient reports no one else is available to assist.    Occupational Therapy Goals  Initiated 5/27/2021  1. Patient will perform grooming seated edge of bed with minimal assistance/contact guard assist within 7 day(s). 2.  Patient will perform upper body bathing and dressing with supervision in supported sitting within 7 day(s). 3.  Patient will perform toilet transfers with maximum assistance  within 7 day(s). 4.  Patient will participate in upper extremity therapeutic exercise/activities with minimal cues for 10 minutes within 7 day(s). Outcome: Not Met    OCCUPATIONAL THERAPY EVALUATION  Patient: Mara Mcgill (54 y.o. male)  Date: 5/27/2021  Primary Diagnosis: Acute GI bleeding [K92.2]  Acute blood loss anemia [D62]  Procedure(s) (LRB):  ESOPHAGOGASTRODUODENOSCOPY (EGD) (N/A)  ENDOSCOPIC BANDING OR LIGATION (N/A) 3 Days Post-Op   Precautions:        ASSESSMENT  Based on the objective data described below, the patient presents with deficits in self-care, primarily due to confusion, decreased safety, poor insight into deficits, poor activity tolerance, general weakness (R > L due to old CVA), impaired balance, and decreased functional mobility. Patient requiring Max A x 2 for all bed mobility. Patient was not safe to attempt to stand this date. Unable to obtain accurate history, but know that his wife did assist him with ADL (not sure to what extent). Since he is requiring assistance of 2 for all tasks, he is likely functioning below his baseline level. Patient will benefit from skilled OT treatment to maximize independence and safety in ADL.     Current Level of Function Impacting Discharge (ADLs/self-care): up to Total A    Functional Outcome Measure: The patient scored 10/100 on the Barthel Index. Other factors to consider for discharge: N/A     Patient will benefit from skilled therapy intervention to address the above noted impairments. PLAN :  Recommendations and Planned Interventions: self care training, functional mobility training, therapeutic exercise, balance training, therapeutic activities, cognitive retraining, endurance activities, neuromuscular re-education, patient education, home safety training, and family training/education    Frequency/Duration: Patient will be followed by occupational therapy 3 times a week to address goals. Recommendation for discharge: (in order for the patient to meet his/her long term goals)  Therapy up to 5 days/week in SNF setting    This discharge recommendation:  A follow-up discussion with the attending provider and/or case management is planned    IF patient discharges home will need the following DME: Defer to facility       SUBJECTIVE:   Patient stated I don't know.  (orientation to time)    OBJECTIVE DATA SUMMARY:   HISTORY:   Past Medical History:   Diagnosis Date    Diabetes ([facility] [address]     Neurological disorder    History reviewed. No pertinent surgical history. Expanded or extensive additional review of patient history:     Home Situation  One/Two Story Residence: Two story, live on 1st floor  Living Alone: No    Hand dominance: Left    EXAMINATION OF PERFORMANCE DEFICITS:  Cognitive/Behavioral Status:  Neurologic State: Alert  Orientation Level: Disoriented to time;Disoriented to situation;Oriented to person;Oriented to place  Cognition: Decreased attention/concentration; Follows commands; Impaired decision making;Poor safety awareness  Perception: Appears intact  Perseveration: Perseverates during conversation  Safety/Judgement: Awareness of environment;Decreased insight into deficits; Decreased awareness of need for safety;Decreased awareness of need for assistance      Vision/Perceptual:    Not formally tested. Will assess as indicated                                Range of Motion:  AROM: Generally decreased, functional  PROM: Generally decreased, functional                      Strength:  Strength: Generally decreased, functional                Coordination:  Coordination: Generally decreased, functional  Fine Motor Skills-Upper: Right Impaired;Left Intact    Gross Motor Skills-Upper: Right Impaired;Left Intact    Balance:  Sitting: Impaired  Sitting - Static: Good (unsupported)  Sitting - Dynamic: Fair (occasional)    Functional Mobility and Transfers for ADLs:  Bed Mobility:  Rolling: Maximum assistance  Supine to Sit: Maximum assistance;Assist x2  Sit to Supine: Maximum assistance;Assist x2  Scooting: Moderate assistance    Transfers:       ADL Assessment:  Feeding: Setup;Supervision    Oral Facial Hygiene/Grooming: Minimum assistance; Additional time    Bathing: Maximum assistance    Upper Body Dressing: Moderate assistance    Lower Body Dressing: Total assistance    Toileting: Total assistance                Functional Measure:  Barthel Index:    Bathin  Bladder: 0  Bowels: 5  Groomin  Dressin  Feedin  Mobility: 0  Stairs: 0  Toilet Use: 0  Transfer (Bed to Chair and Back): 0  Total: 10/100        The Barthel ADL Index: Guidelines  1. The index should be used as a record of what a patient does, not as a record of what a patient could do. 2. The main aim is to establish degree of independence from any help, physical or verbal, however minor and for whatever reason. 3. The need for supervision renders the patient not independent. 4. A patient's performance should be established using the best available evidence. Asking the patient, friends/relatives and nurses are the usual sources, but direct observation and common sense are also important. However direct testing is not needed.   5. Usually the patient's performance over the preceding 24-48 hours is important, but occasionally longer periods will be relevant. 6. Middle categories imply that the patient supplies over 50 per cent of the effort. 7. Use of aids to be independent is allowed. Thien Miller., Barthel, D.W. ([id number]). Functional evaluation: the Barthel Index. [address] [facility] (14)2. KATHRYN Jimenez, Mel Díaz., Clara Tuttle., Jorge L Lopez, [facility] (1999). Measuring the change indisability after inpatient rehabilitation; comparison of the responsiveness of the Barthel Index and Functional [place] Measure. Journal of Neurology, Neurosurgery, and Psychiatry, 66(4), 904-622. Ricky Reynaga, GREGORYJ.MAGDALENA, JACOBO Edward, & Sander Breaux M.A. (2004.) Assessment of post-stroke quality of life in cost-effectiveness studies: The usefulness of the Barthel Index and the EuroQoL-5D. Quality of Life Research, 15, 196-17        Occupational Therapy Evaluation Charge Determination   History Examination Decision-Making   MEDIUM Complexity : Expanded review of history including physical, cognitive and psychosocial  history  HIGH Complexity : 5 or more performance deficits relating to physical, cognitive , or psychosocial skils that result in activity limitations and / or participation restrictions MEDIUM Complexity : Patient may present with comorbidities that affect occupational performnce. Miniml to moderate modification of tasks or assistance (eg, physical or verbal ) with assesment(s) is necessary to enable patient to complete evaluation       Based on the above components, the patient evaluation is determined to be of the following complexity level: MEDIUM  Pain Rating:  Pain did not interfere with therapy session.     Activity Tolerance:   Poor    After treatment patient left in no apparent distress:    Supine in bed, Call bell within reach, and Side rails x 3    COMMUNICATION/EDUCATION:   The patients plan of care was discussed with: Physical therapist and Registered nurse. Patient is unable to participate in goal setting and plan of care. This patients plan of care is appropriate for delegation to [facility].     Thank you for this referral.  Lit Durbin OTR/L  Time Calculation: 29 mins

## 2021-05-27 NOTE — PROGRESS NOTES
Problem: Mobility Impaired (Adult and Pediatric)  Goal: *Acute Goals and Plan of Care (Insert Text)  Description: FUNCTIONAL STATUS PRIOR TO ADMISSION: Patient was modified independent using a single point cane for functional mobility. Very sedentary per pt. HOME SUPPORT PRIOR TO ADMISSION: The patient lived with wife. Physical Therapy Goals  Initiated 5/27/2021  1. Patient will move from supine to sit and sit to supine  in bed with moderate assistance  within 7 day(s). 2.  Patient will transfer from bed to chair and chair to bed with maximal assistance using the least restrictive device within 7 day(s). 3.  Patient will perform sit to stand with maximal assistance within 7 day(s). 4.  Patient will ambulate with maximal assistance for 2 feet with the least restrictive device within 7 day(s). Outcome: Not Met    PHYSICAL THERAPY EVALUATION  Patient: Amparo Mullins (69 y.o. male)  Date: 5/27/2021  Primary Diagnosis: Acute GI bleeding [K92.2]  Acute blood loss anemia [D62]  Procedure(s) (LRB):  ESOPHAGOGASTRODUODENOSCOPY (EGD) (N/A)  ENDOSCOPIC BANDING OR LIGATION (N/A) 3 Days Post-Op   Precautions: fall         ASSESSMENT  Based on the objective data described below, the patient presents with generalized weakness (baseline R sided deficits from stroke), decreased activity tolerance, and overall decreased functional mobility. Pt was received in supine and cleared by nursing to mobilize, pt saturated and needed new linens. Worked on rolling and coming to the EOB. Bed malfunctioning and not safe to attempt to stand due to inflated mattress and bed unable to lower any further. He was returned to supine and worked on scooting up in bed and bridging to shift hips. Seems confused and having more difficulty with answering questions than at baseline, pt confirmed. Current Level of Function Impacting Discharge (mobility/balance): max A x 2    Functional Outcome Measure:   The patient scored Total: 10/100 on the Barthel Index which is indicative of 90% impaired ability to care for basic self needs/dependency on others. Other factors to consider for discharge: previous CVA     Patient will benefit from skilled therapy intervention to address the above noted impairments. PLAN :  Recommendations and Planned Interventions: bed mobility training, transfer training, gait training, therapeutic exercises, patient and family training/education, and therapeutic activities      Frequency/Duration: Patient will be followed by physical therapy:  4 times a week to address goals. Recommendation for discharge: (in order for the patient to meet his/her long term goals)  Therapy up to 5 days/week in SNF setting    This discharge recommendation:  Has not yet been discussed the attending provider and/or case management    IF patient discharges home will need the following DME: to be determined (TBD)         SUBJECTIVE:   Patient stated Leonetta Sessions don't do anything at home.     OBJECTIVE DATA SUMMARY:   HISTORY:    Past Medical History:   Diagnosis Date    Diabetes ([facility] [address]     Neurological disorder    History reviewed. No pertinent surgical history.     Personal factors and/or comorbidities impacting plan of care: previous CVA with R sided deficits     Home Situation  One/Two Story Residence: (P) Two story, live on 1st floor  Living Alone: (P) No    EXAMINATION/PRESENTATION/DECISION MAKING:   Critical Behavior:  Neurologic State: Alert  Orientation Level: Oriented X4        Hearing:     Skin:  intact  Edema: WDL  Range Of Motion:  AROM: Generally decreased, functional           PROM: Generally decreased, functional           Strength:    Strength: Generally decreased, functional                                      Coordination:  Coordination: Generally decreased, functional  Vision:      Functional Mobility:  Bed Mobility:  Rolling: Maximum assistance  Supine to Sit: Maximum assistance;Assist x2  Sit to Supine: Maximum assistance;Assist x2  Scooting: Moderate assistance  Transfers:            Not tested due to unsafe bed position                 Balance:   Sitting: Impaired  Sitting - Static: Good (unsupported)  Sitting - Dynamic: Fair (occasional)  Ambulation/Gait Training:        Not tested due to unsafe bed position                     Functional Measure:  Barthel Index:    Bathin  Bladder: 0  Bowels: 5  Groomin  Dressin  Feedin  Mobility: 0  Stairs: 0  Toilet Use: 0  Transfer (Bed to Chair and Back): 0  Total: 10/100       The Barthel ADL Index: Guidelines  1. The index should be used as a record of what a patient does, not as a record of what a patient could do. 2. The main aim is to establish degree of independence from any help, physical or verbal, however minor and for whatever reason. 3. The need for supervision renders the patient not independent. 4. A patient's performance should be established using the best available evidence. Asking the patient, friends/relatives and nurses are the usual sources, but direct observation and common sense are also important. However direct testing is not needed. 5. Usually the patient's performance over the preceding 24-48 hours is important, but occasionally longer periods will be relevant. 6. Middle categories imply that the patient supplies over 50 per cent of the effort. 7. Use of aids to be independent is allowed. Saeid Vaughan., Barthel, D.W. ([id number]). Functional evaluation: the Barthel Index. [address] [facility] (14)2. KATHRYN Aguilar, Shanae Devi., Lucius Hernandez., [place], [address] [place] [address] (). Measuring the change indisability after inpatient rehabilitation; comparison of the responsiveness of the Barthel Index and Functional [place] Measure. Journal of Neurology, Neurosurgery, and Psychiatry, 66(4), 452-129.   Timbo Bailey, N.J.A, MALENA EdwardJ.M, & Nella Bagley, M.A. (2004.) Assessment of post-stroke quality of life in cost-effectiveness studies: The usefulness of the Barthel Index and the EuroQoL-5D. Quality of Life Research, 15, 877-79        Physical Therapy Evaluation Charge Determination   History Examination Presentation Decision-Making   HIGH Complexity :3+ comorbidities / personal factors will impact the outcome/ POC  MEDIUM Complexity : 3 Standardized tests and measures addressing body structure, function, activity limitation and / or participation in recreation  MEDIUM Complexity : Evolving with changing characteristics  Other outcome measures barthel  HIGH       Based on the above components, the patient evaluation is determined to be of the following complexity level: MEDIUM    Pain Rating:  No complaints     Activity Tolerance:   Fair    After treatment patient left in no apparent distress:   Supine in bed and Call bell within reach    COMMUNICATION/EDUCATION:   The patients plan of care was discussed with: Occupational therapist and Registered nurse. Fall prevention education was provided and the patient/caregiver indicated understanding. and Patient is unable to participate in goal setting and plan of care.     Thank you for this referral.  Amarilis Brito, PT, DPT   Time Calculation: 29 mins

## 2021-05-27 NOTE — PROGRESS NOTES
Transition of Care Plan:     RUR:  15%     Disposition: Home with wife with home health services vs snf      Follow up appointments: To be made prior to discharge if going home.      DME needed: To be determined.      Transportation at [address] [place] [address] vs [address]    [place] or means to access home:  Wife has keys.       Medicare letter: To be given prior to discharge.      Caregiver Contact: Wife (Ken Paul)     Discharge Caregiver contacted prior to discharge?   Caregiver to be contacted prior to discharge.        Spoke with patient's wife re dcp and she states now the patient does not want to go to [facility] or [facility] because he knows people there. List of snf left in patient's room for them to make a decision then she states she will get back with me. Dora Cobb  RN BSN CRM        [phone]

## 2021-05-27 NOTE — PROGRESS NOTES
Spiritual Care Assessment/Progress Note  [facility]      NAME: Misael Vital      MRN: [id number]  AGE: 57 y.o. SEX: male  Christian Affiliation: Jain   Language: English     5/27/2021     Total Time (in minutes): 8     Spiritual Assessment begun in [facility] PROGRESSIVE CARE through conversation with:         []Patient        [] Family    [] Friend(s)        Reason for Consult: Initial/Spiritual assessment, patient floor     Spiritual beliefs: (Please include comment if needed)     [] Identifies with a seymour tradition:         [] Supported by a seymour community:            [] Claims no spiritual orientation:           [] Seeking spiritual identity:                [] Adheres to an individual form of spirituality:           [x] Not able to assess:                           Identified resources for coping:      [] Prayer                               [] Music                  [] Guided Imagery     [] Family/friends                 [] Pet visits     [] Devotional reading                         [x] Unknown     [] Other:                                         Interventions offered during this visit: (See comments for more details)    Patient Interventions: Initial/Spiritual assessment, patient floor           Plan of Care:     [x] Support spiritual and/or cultural needs    [] Support AMD and/or advance care planning process      [] Support grieving process   [] Coordinate Rites and/or Rituals    [] Coordination with community clergy   [] No spiritual needs identified at this time   [] Detailed Plan of Care below (See Comments)  [] Make referral to Music Therapy  [] Make referral to Pet Therapy     [] Make referral to Addiction services  [] Make referral to [facility]  [] Make referral to Spiritual Care Partner  [] No future visits requested        [x] Follow up upon further referrals     Comments:  Multiple attempts to visit patient on PCU for spiritual assessment. No family/friends present. Patient is on airborne precautions.  could view patient through window; he appeared to be sleeping. Unable to assess for spiritual needs or concerns at this time.    available upon referral by nurse or by patient request.       TATE Harmon, [facility], Staff [address] [place], [place] [address]ing Service  [phone])

## 2021-05-28 LAB
GLUCOSE BLD STRIP.AUTO-MCNC: 100 MG/DL (ref 65–117)
GLUCOSE BLD STRIP.AUTO-MCNC: 110 MG/DL (ref 65–117)
GLUCOSE BLD STRIP.AUTO-MCNC: 133 MG/DL (ref 65–117)
GLUCOSE BLD STRIP.AUTO-MCNC: 168 MG/DL (ref 65–117)
HCT VFR BLD AUTO: 25.2 % (ref 36.6–50.3)
HGB BLD-MCNC: 7.9 G/DL (ref 12.1–17)
INR PPP: 1.2 (ref 0.9–1.1)
PROTHROMBIN TIME: 12.3 SEC (ref 9–11.1)
SERVICE CMNT-IMP: ABNORMAL
SERVICE CMNT-IMP: ABNORMAL
SERVICE CMNT-IMP: NORMAL
SERVICE CMNT-IMP: NORMAL

## 2021-05-28 PROCEDURE — 74011250637 HC RX REV CODE- 250/637: Performed by: INTERNAL MEDICINE

## 2021-05-28 PROCEDURE — 74011250637 HC RX REV CODE- 250/637: Performed by: STUDENT IN AN ORGANIZED HEALTH CARE EDUCATION/TRAINING PROGRAM

## 2021-05-28 PROCEDURE — 65270000029 HC RM PRIVATE

## 2021-05-28 PROCEDURE — 97116 GAIT TRAINING THERAPY: CPT

## 2021-05-28 PROCEDURE — 85610 PROTHROMBIN TIME: CPT

## 2021-05-28 PROCEDURE — 74011250636 HC RX REV CODE- 250/636: Performed by: STUDENT IN AN ORGANIZED HEALTH CARE EDUCATION/TRAINING PROGRAM

## 2021-05-28 PROCEDURE — 97530 THERAPEUTIC ACTIVITIES: CPT

## 2021-05-28 PROCEDURE — 82962 GLUCOSE BLOOD TEST: CPT

## 2021-05-28 PROCEDURE — C9113 INJ PANTOPRAZOLE SODIUM, VIA: HCPCS | Performed by: STUDENT IN AN ORGANIZED HEALTH CARE EDUCATION/TRAINING PROGRAM

## 2021-05-28 PROCEDURE — 74011000250 HC RX REV CODE- 250: Performed by: STUDENT IN AN ORGANIZED HEALTH CARE EDUCATION/TRAINING PROGRAM

## 2021-05-28 PROCEDURE — 74011250636 HC RX REV CODE- 250/636: Performed by: INTERNAL MEDICINE

## 2021-05-28 PROCEDURE — 74011636637 HC RX REV CODE- 636/637: Performed by: INTERNAL MEDICINE

## 2021-05-28 PROCEDURE — 94760 N-INVAS EAR/PLS OXIMETRY 1: CPT

## 2021-05-28 PROCEDURE — 74011000258 HC RX REV CODE- 258: Performed by: INTERNAL MEDICINE

## 2021-05-28 PROCEDURE — 36415 COLL VENOUS BLD VENIPUNCTURE: CPT

## 2021-05-28 PROCEDURE — 85018 HEMOGLOBIN: CPT

## 2021-05-28 RX ADMIN — LACTULOSE 30 ML: 20 SOLUTION ORAL at 08:45

## 2021-05-28 RX ADMIN — SODIUM CHLORIDE 40 MG: 9 INJECTION, SOLUTION INTRAMUSCULAR; INTRAVENOUS; SUBCUTANEOUS at 08:45

## 2021-05-28 RX ADMIN — LACTULOSE 30 ML: 20 SOLUTION ORAL at 17:57

## 2021-05-28 RX ADMIN — Medication 10 ML: at 05:24

## 2021-05-28 RX ADMIN — INSULIN LISPRO 4 UNITS: 100 INJECTION, SOLUTION INTRAVENOUS; SUBCUTANEOUS at 17:57

## 2021-05-28 RX ADMIN — Medication 10 ML: at 22:21

## 2021-05-28 RX ADMIN — Medication 1000 UNITS: at 08:46

## 2021-05-28 RX ADMIN — INSULIN LISPRO 4 UNITS: 100 INJECTION, SOLUTION INTRAVENOUS; SUBCUTANEOUS at 08:46

## 2021-05-28 RX ADMIN — Medication 100 MG: at 08:45

## 2021-05-28 RX ADMIN — ATORVASTATIN CALCIUM 10 MG: 10 TABLET, FILM COATED ORAL at 22:21

## 2021-05-28 RX ADMIN — RIFAXIMIN 550 MG: 550 TABLET ORAL at 08:45

## 2021-05-28 RX ADMIN — METOPROLOL TARTRATE 50 MG: 50 TABLET, FILM COATED ORAL at 18:02

## 2021-05-28 RX ADMIN — CEFTRIAXONE 1 G: 1 INJECTION, POWDER, FOR SOLUTION INTRAMUSCULAR; INTRAVENOUS at 17:57

## 2021-05-28 RX ADMIN — Medication 10 ML: at 13:30

## 2021-05-28 RX ADMIN — RIFAXIMIN 550 MG: 550 TABLET ORAL at 18:02

## 2021-05-28 RX ADMIN — INSULIN LISPRO 4 UNITS: 100 INJECTION, SOLUTION INTRAVENOUS; SUBCUTANEOUS at 12:00

## 2021-05-28 RX ADMIN — METOPROLOL TARTRATE 50 MG: 50 TABLET, FILM COATED ORAL at 08:45

## 2021-05-28 RX ADMIN — INSULIN GLARGINE 67 UNITS: 100 INJECTION, SOLUTION SUBCUTANEOUS at 08:47

## 2021-05-28 RX ADMIN — AMLODIPINE BESYLATE 10 MG: 5 TABLET ORAL at 08:45

## 2021-05-28 RX ADMIN — HYDROCHLOROTHIAZIDE 12.5 MG: 25 TABLET ORAL at 08:46

## 2021-05-28 RX ADMIN — SODIUM CHLORIDE 40 MG: 9 INJECTION, SOLUTION INTRAMUSCULAR; INTRAVENOUS; SUBCUTANEOUS at 22:21

## 2021-05-28 NOTE — PROGRESS NOTES
Hospitalist Progress Note    NAME: Annalisa Reaves   :  1960   MRN:  [id number]     Admit date: 2021    Today's date: 21    PCP: Abram Rivero MD      Assessment / Plan:  Acute blood less anemia POA HgB 15.5 to 6.2  Esophageal varices POA  Upper GI bleed POA  Alcohol-induced cirrhosis POA  Alcohol abuse POA  - HgB at PCPs office was low 6.2,  sent to ED  -CT abdomen/pelvis with nodular liver, suggestive of cirrhosis        No bleeding source  -EGD  with Dr Lorne Pickering       Large varices with red shalini stigmata and 'white nipple'                 Suggests very recent active bleeding       6 bands were placed  - s/p 5 units PRBC  - octreotide gtt, continue  -IV Protonix twice daily  - IV ceftriaxone for prophylaxis   -CIWA protocol  -Hold the Plavix and the aspirin for 7-10 days          Patient taken this because of the CVA with right-sided deficit    -Discontinued octreotide drip, on beta-blocker. Continue to hold antiplatelets      AMS  Hepatic encephalopathy, improving/resolved  On , he became more confused, altered, encephalopathic, improved with p.o. 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     Code Status: Full code  Surrogate Decision Maker: Wife     DVT Prophylaxis: SCD  GI Prophylaxis: not indicated     Baseline: Ambulatory at home with assistance Initially physical therapy recommended SNF, was reassessed today and feels that he can safely go to home with home health. Discussed with the wife this afternoon. She can take home tomorrow morning. Case management will set up home health     Subjective:     Chief Complaint / Reason for Physician Visit f/u Gi bleed  Doing well, did walk some with physical therapy. Had around 2 bowel movements. No melena    Review of Systems:  Symptom Y/N Comments  Symptom Y/N Comments   Fever/Chills n   Chest Pain n    Poor Appetite    Edema     Cough n   Abdominal Pain n    Sputum    Joint Pain     SOB/SHEPHERD n   Headache     Nausea/vomit n   Tolerating PT/OT     Diarrhea n   Tolerating Diet y    Constipation    Other       Could NOT obtain due to:      Objective:     VITALS:   Last 24hrs VS reviewed since prior progress note.  Most recent are:  Patient Vitals for the past 24 hrs:   Temp Pulse Resp BP SpO2   05/28/21 1501 97.6 °F (36.4 °C) 68 17 (!) 111/55 100 %   05/28/21 1136 98.4 °F (36.9 °C) 64 17 (!) 123/100 100 %   05/28/21 0745 98.2 °F (36.8 °C) 73 17 136/67 100 %   05/28/21 0415 97.9 °F (36.6 °C) 71 18 (!) 142/68 100 %   05/27/21 2330 98 °F (36.7 °C) 70 18 (!) 147/87 100 %   05/27/21 1802 97.1 °F (36.2 °C) 69 16 (!) 142/83 100 %   05/27/21 1526 97.8 °F (36.6 °C) 69 16 133/79 100 %       Intake/Output Summary (Last 24 hours) at 5/28/2021 1506  Last data filed at 5/28/2021 1407  Gross per 24 hour   Intake 50 ml   Output 525 ml   Net -475 ml        Wt Readings from Last 12 Encounters:   05/23/21 156 kg (343 lb 14.7 oz)   04/19/18 151.5 kg (334 lb)   09/25/17 (!) 158.8 kg (350 lb 3.2 oz)   03/27/17 (!) 163 kg (359 lb 4.8 oz)   11/28/16 (!) 167.1 kg (368 lb 6.4 oz)   06/28/16 (!) 167.4 kg (369 lb)   03/28/16 (!) 167.5 kg (369 lb 3.2 oz)   12/29/15 (!) 167.9 kg (370 lb 3.2 oz)   10/19/15 (!) 164.7 kg (363 lb)   08/27/15 (!) 160.4 kg (353 lb 9.6 oz)   05/29/15 156.5 kg (345 lb)   05/20/15 154.2 kg (340 lb)       PHYSICAL EXAM:    I had a face to face encounter and independently examined this patient on 05/28/21 as outlined below:    General: WD, WN. Alert, cooperative, no acute distress    EENT:  PERRL. Anicteric sclerae. Pale MM  Resp:  CTA bilaterally, no wheezing or rales. No accessory muscle use  CV:  Regular  rhythm,  No edema  GI:  Soft, Non distended, Non tender. +Bowel sounds, no rebound  LN:  No cervical or inguinal DANA  Neurologic:  Alert and oriented X 3, normal speech, non focal motor exam  Psych:   Good insight. Not anxious nor agitated  Skin:  No rashes. No jaundice    Reviewed most current lab test results and cultures  YES  Reviewed most current radiology test results   YES  Review and summation of old records today    NO  Reviewed patient's current orders and MAR    YES  PMH/SH reviewed - no change compared to H&P  ________________________________________________________________________  Care Plan discussed with:    Comments   Patient x    Family      RN x    Care Manager     Consultant                        Multidiciplinary team rounds were held today with , nursing, pharmacist and clinical coordinator. Patient's plan of care was discussed; medications were reviewed and discharge planning was addressed. ________________________________________________________________________      Comments   >50% of visit spent in counseling and coordination of care     ________________________________________________________________________  Kirill Loaiza MD     Procedures: see electronic medical records for all procedures/Xrays and details which were not copied into this note but were reviewed prior to creation of Plan. LABS:  I reviewed today's most current labs and imaging studies.   Pertinent labs include:  Recent Labs     05/28/21  0348 05/27/21  1630 05/27/21  0314 05/26/21  0347   WBC  --   --   --  11.9*   HGB 7.9* 8.4* 7.9* 7.0*  7.1*   HCT 25.2* 26.6* 24.8* 22.4*  22.9*   PLT  --   --   -- 179     Recent Labs     05/28/21 0348 05/27/21 0314 05/26/21 0347   NA  --  136 144   K  --  3.1* 3.4*   CL  --  104 111*   CO2  --  28 29   GLU  --  117* 150*   BUN  --  17 32*   CREA  --  1.06 1.37*   CA  --  8.0* 7.8*   MG  --  1.6 1.8   ALB  --   --  2.7*   TBILI  --   --  0.7   ALT  --   --  64   INR 1.2* 1.2*  --

## 2021-05-28 NOTE — PROGRESS NOTES
GI PROGRESS NOTE  Marshall Solomon, TORIN  [phone] NP in-hospital cell phone M-F until 4:30  After 5pm or on weekends, please call  for physician on call    John Fuentes :1960 MPL:[id number]   ATTG: Dr Svetlana Alonzo  PCP: Rory Warren MD  Date/Time:  2021 1318 PM     Primary GI: Dr. Pedrito Bañuelos:   Acute anemia - GI bleed - Melena- On plavix (last dose 21) - NSAID use  Hematemesis x1 - resolved  Esophageal varices with likely recent active bleeding - banded x 6. New Dx of decompensated cirrhosis - hx of ETOH abuse  Hepatic encephalopathy  - Stools are normalizing  - Hgbstable    CT abd/pel 21: 1. There is no visible gastrointestinal bleeding. 2. Nodular liver contour with somewhat heterogeneous parenchyma suggestive of  cirrhosis. 3. Incidental findings as above - cholelithiasis. EGD 21: 1. Large varices with red shalini stigmata and 'white nipple' sign suggesting very recent active bleeding. Six bands successfully placed with deflation of varices, including band placed directly over the 'white nipple'  2. Normal stomach, including retroflexion. Notably no gastric varices  3. Normal duodenal bulb and 2nd/3rd portion of the duodenum     ETOH abuse - current use - last drink 21  - Drinks about 3 shots of liquor daily for the last year with COVID but has drank daily for his whole life. DMII  Metabolic acidosis  ABBEY  CVA 5 years ago - on plavix since that time     Plan:   -Continue BID PPI  -Continue Rifaximin  -Continue lactulose - titrate to 3-4 bms per day  -Encouraged ETOH abstinence  -Continue betablocker  -Continue Ceftriaxone 1gm daily x 7 days, can transition to ciprofloxacin when ready to D/C   -If pt rebleeds during this admission, will need to go for emergent TIPS procedures  -Hold plavix for 7 days, total  -Will need outpatient referral to Dr Bernardo Schaefer.  -Will sign off for now as GI has nothing further to add.  Please call GI with any further questions or concerns. *Plan discussed with Dr Virgen Prado. Subjective:   . Patient resting comfortably in chair at bedside during visit. Reports feels great! No abdominal pain, no nausea or vomiting. Tolerating diet. Reports was able to get up and get a bath this am, feeling good! Complaint Y/N Description   Abdominal Pain n    Hematemesis n    Hematochezia n    Melena n    Constipation n    Diarrhea n    Dyspepsia n    Dysphagia n    Jaundiced n    Nausea/vomiting n      Review of Systems:  Symptom Y/N Comments  Symptom Y/N Comments   Fever/Chills    Chest Pain     Cough    Headaches     Sputum    Joint Pain     SOB/SHEPHERD    Pruritis/Rash     Tolerating Diet y   Other       Could NOT obtain due to:      Objective:   VITALS:   Last 24hrs VS reviewed since prior progress note. Most recent are:  Visit Vitals  /67   Pulse 73   Temp 98.2 °F (36.8 °C)   Resp 17   Ht 6' 2\" (1.88 m)   Wt 156 kg (343 lb 14.7 oz)   SpO2 100%   BMI 44.16 kg/m²       Intake/Output Summary (Last 24 hours) at 5/28/2021 1056  Last data filed at 5/27/2021 2144  Gross per 24 hour   Intake 50 ml   Output --   Net 50 ml     PHYSICAL EXAM:  General: WD, obese. Alert, cooperative, no acute distress. HEENT: NC, Atraumatic. Anicteric sclerae. Lungs:  CTA Bilaterally. No Wheezing/Rhonchi/Rales. Heart:  Regular  rhythm,  No murmur, No Rubs, No Gallops. Abdomen: Soft, Non-distended, Non tender.  +Bowel sounds, no HSM  Extremities: BLE +1 edema  Neurologic:  Alert and oriented X 3. Chronic slowed speech  Psych:   Good insight. Not anxious nor agitated.     Lab and Radiology Data Reviewed: (see below)    Medications Reviewed: (see below)  PMH/SH reviewed - no change compared to H&P  ________________________________________________________________________  Total time spent with patient: 20 minutes ________________________________________________________________________  Care Plan discussed with:  Patient y   Family     RN Consultant:       Jill Abad NP     Procedures: see electronic medical records for all procedures/Xrays and details which were not copied into this note but were reviewed prior to creation of Plan. LABS:  Recent Labs     05/28/21  0348 05/27/21  1630 05/26/21 0347   WBC  --   --  11.9*   HGB 7.9* 8.4* 7.0*  7.1*   HCT 25.2* 26.6* 22.4*  22.9*   PLT  --   --  179     Recent Labs     05/27/21  0314 05/26/21  0347    144   K 3.1* 3.4*    111*   CO2 28 29   BUN 17 32*   CREA 1.06 1.37*   * 150*   CA 8.0* 7.8*   MG 1.6 1.8     Recent Labs     05/26/21 0347   *   TP 7.0   ALB 2.7*   GLOB 4.3*     Recent Labs     05/28/21  0348 05/27/21  0314   INR 1.2* 1.2*   PTP 12.3* 12.3*      No results for input(s): FE, TIBC, PSAT, FERR in the last 72 hours. Lab Results   Component Value Date/Time    Folate 18.0 05/24/2021 12:56 AM     No results for input(s): PH, PCO2, PO2 in the last 72 hours. No results for input(s): CPK, CKMB in the last 72 hours.     No lab exists for component: TROPONINI  Lab Results   Component Value Date/Time    Color DARK YELLOW 05/28/2014 06:47 PM    Appearance CLEAR 05/28/2014 06:47 PM    Specific gravity >1.030 (H) 05/28/2014 06:47 PM    pH (UA) 5.0 05/28/2014 06:47 PM    Protein 100 (A) 05/28/2014 06:47 PM    Glucose >1,000 (A) 05/28/2014 06:47 PM    Ketone TRACE (A) 05/28/2014 06:47 PM    Bilirubin SMALL (A) 05/28/2014 06:47 PM    Urobilinogen 1.0 05/28/2014 06:47 PM    Nitrites NEGATIVE  05/28/2014 06:47 PM    Leukocyte Esterase NEGATIVE  05/28/2014 06:47 PM    Epithelial cells FEW 05/28/2014 06:47 PM    Bacteria NEGATIVE  05/28/2014 06:47 PM    WBC 0-4 05/28/2014 06:47 PM    RBC 5-10 05/28/2014 06:47 PM       MEDICATIONS:  Current Facility-Administered Medications   Medication Dose Route Frequency    lactulose (CHRONULAC) 10 gram/15 mL solution 30 mL  20 g Oral BID    rifAXIMin (XIFAXAN) tablet 550 mg  550 mg Oral BID    0.9% sodium chloride infusion 250 mL  250 mL IntraVENous PRN    metoprolol tartrate (LOPRESSOR) tablet 50 mg  50 mg Oral BID    0.9% sodium chloride infusion 250 mL  250 mL IntraVENous PRN    sodium chloride (NS) flush 5-40 mL  5-40 mL IntraVENous Q8H    sodium chloride (NS) flush 5-40 mL  5-40 mL IntraVENous PRN    0.9% sodium chloride infusion 250 mL  250 mL IntraVENous PRN    cefTRIAXone (ROCEPHIN) 1 g in 0.9% sodium chloride (MBP/ADV) 50 mL MBP  1 g IntraVENous Q24H    insulin lispro (HUMALOG) injection 4 Units  4 Units SubCUTAneous TIDAC    And    insulin glargine (LANTUS) injection 67 Units  67 Units SubCUTAneous DAILY    thiamine mononitrate (B-1) tablet 100 mg  100 mg Oral DAILY    pantoprazole (PROTONIX) 40 mg in 0.9% sodium chloride 10 mL injection  40 mg IntraVENous Q12H    acetaminophen (TYLENOL) tablet 650 mg  650 mg Oral Q6H PRN    Or    acetaminophen (TYLENOL) suppository 650 mg  650 mg Rectal Q6H PRN    polyethylene glycol (MIRALAX) packet 17 g  17 g Oral DAILY PRN    ondansetron (ZOFRAN ODT) tablet 4 mg  4 mg Oral Q8H PRN    Or    ondansetron (ZOFRAN) injection 4 mg  4 mg IntraVENous Q6H PRN    atorvastatin (LIPITOR) tablet 10 mg  10 mg Oral QHS    cholecalciferol (VITAMIN D3) (1000 Units /25 mcg) tablet 1,000 Units  1,000 Units Oral DAILY    hydroCHLOROthiazide (HYDRODIURIL) tablet 12.5 mg  12.5 mg Oral DAILY    [Held by provider] gabapentin (NEURONTIN) capsule 300 mg  300 mg Oral TID    amLODIPine (NORVASC) tablet 10 mg  10 mg Oral DAILY    insulin lispro (HUMALOG) injection   SubCUTAneous AC&HS    glucose chewable tablet 16 g  4 Tablet Oral PRN    dextrose (D50W) injection syrg 12.5-25 g  12.5-25 g IntraVENous PRN    glucagon (GLUCAGEN) injection 1 mg  1 mg IntraMUSCular PRN    LORazepam (ATIVAN) tablet 2 mg  2 mg Oral Q4H PRN

## 2021-05-28 NOTE — PROGRESS NOTES
End of Shift Note    Bedside shift change report given to Bharat Kennedy RN (oncoming nurse) by Lance Magaña RN (offgoing nurse). Report included the following information SBAR, Kardex, Intake/Output, MAR and Recent Results    Shift worked:  [id number]     Shift summary and any significant changes:     pt up to chair with cane & minimal standby assist per PT. Pt tolerating diet. Concerns for physician to address:      Zone phone for oncoming shift:   [phone]       Activity:  Activity Level: Bed Rest  Number times ambulated in hallways past shift: 0  Number of times OOB to chair past shift: 0    Cardiac:   Cardiac Monitoring: No      Cardiac Rhythm: Sinus Rhythm    Access:   Current line(s): PIV     Genitourinary:   Urinary status: voiding    Respiratory:   O2 Device: None (Room air)  Chronic home O2 use?: NO  Incentive spirometer at bedside: YES     GI:  Last Bowel Movement Date: 05/27/21  Current diet:  DIET GI LITE (POST SURGICAL)  Passing flatus: YES  Tolerating current diet: YES       Pain Management:   Patient states pain is manageable on current regimen: YES    Skin:  Power Score: 15  Interventions: float heels and increase time out of bed    Patient Safety:  Fall Score:  Total Score: 4  Interventions: bed/chair alarm, assistive device (walker, cane, etc), gripper socks and pt to call before getting OOB  High Fall Risk: Yes    Length of Stay:  Expected LOS: 3d 2h  Actual LOS: 5      Lance Magaña RN

## 2021-05-28 NOTE — PROGRESS NOTES
Transition of Care Plan:     RUR:  15%   Disposition: Home with wife with home health services vs snf   Follow up appointments: To be made prior to discharge if going home.   DME:  Pt has a straight cane.   Transportation at [facility] vs [facility]. [place] or means to access home:  Wife has keys.  Medicare letter: To be given prior to discharge.   Caregiver Contact: Wife (Mckenzie Marie)  Discharge Caregiver contacted prior to discharge?   Caregiver to be contacted prior to discharge.     3:50 p.m. [facility] cannot accept. 2:35 p.m.  CM and attending physician met with pt and pt's wife at bedside to discuss d/c plan of HH vs. SNF. Pt ultimately decided on HH. FOC signed and placed on chart. CM will send referrals to the [facility] that take [facility]. CM met with treating therapist who is recommending [facility] at d/c.  CM spoke with pt at bedside regarding current recommendations. Pt wants to meet with spouse and CM later this afternoon around 2:30 p.m. to discuss. CM will check back.       Ravindra Powers,   Care Management, [facility]

## 2021-05-28 NOTE — PROGRESS NOTES
Problem: Mobility Impaired (Adult and Pediatric)  Goal: *Acute Goals and Plan of Care (Insert Text)  Description: FUNCTIONAL STATUS PRIOR TO ADMISSION: Patient was modified independent using a single point cane for functional mobility. Very sedentary per pt. HOME SUPPORT PRIOR TO ADMISSION: The patient lived with wife. Physical Therapy Goals  Initiated 5/27/2021  1. Patient will move from supine to sit and sit to supine  in bed with moderate assistance  within 7 day(s). 2.  Patient will transfer from bed to chair and chair to bed with maximal assistance using the least restrictive device within 7 day(s). 3.  Patient will perform sit to stand with maximal assistance within 7 day(s). 4.  Patient will ambulate with maximal assistance for 2 feet with the least restrictive device within 7 day(s). Outcome: Progressing Towards Goal   PHYSICAL THERAPY TREATMENT  Patient: Toya Montes (41 y.o. male)  Date: 5/28/2021    Diagnosis: Acute GI bleeding [K92.2], Acute blood loss anemia [D62]     Procedure(s) (LRB): ESOPHAGOGASTRODUODENOSCOPY (EGD) (N/A)            ENDOSCOPIC BANDING OR LIGATION (N/A) 4 Days Post-Op    Precautions: falls    Chart, physical therapy assessment, plan of care and goals were reviewed. ASSESSMENT: Patient continues with skilled PT services and is progressing towards goals, no LOB (does very well with SPC), slight SOB, did very well with bed mob and toilet transfer, very motivated to go home, vc's for safety and proper SPC use, pt safe to go home with HHPT for some strengthening and home safety eval.    Current Level of Function Impacting Discharge (mobility/balance): CGA x1         PLAN : Patient continues to benefit from skilled intervention to address the above impairments. Continue treatment per established plan of care  to address goals.     Recommendation for discharge: (in order for the patient to meet his/her long term goals) Physical therapy at least 2 days/week in the home     This discharge recommendation: Has been made in collaboration with the attending provider and/or case management    IF patient discharges home will need the following DME: has straight cane     OBJECTIVE DATA SUMMARY:     Critical Behavior:  Neurologic State: Alert, Confused  Orientation Level: Disoriented to time, Disoriented to situation  Cognition: Memory loss  Safety/Judgement: Awareness of environment, Decreased insight into deficits, Decreased awareness of need for safety, Decreased awareness of need for assistance    Functional Mobility Training:  Bed Mobility:  Supine to Sit: Stand-by assistance; Additional time  Scooting: Stand-by assistance; Additional time  Level of Assistance: Stand-by assistance  Interventions: Verbal cues    Transfers:  Sit to Stand: Contact guard assistance;Assist x1;Additional time  Stand to Sit: Contact guard assistance;Assist x1;Additional time  Bed to Chair: Contact guard assistance;Assist x1;Additional time  Interventions: Tactile cues; Verbal cues  Level of Assistance: Contact guard assistance;Assist x1;Additional time    Balance:  Sitting: Intact; Without support  Sitting - Static: Good (unsupported)  Sitting - Dynamic: Not tested  Standing: Intact; With support    Ambulation/Gait Training:  Distance (ft): 20 Feet (ft)  Assistive Device: Gait belt;Cane, straight  Ambulation - Level of Assistance: Contact guard assistance;Assist x1;Additional time  Gait Abnormalities: Decreased step clearance;Trunk sway increased;Hip Hike  Right Side Weight Bearing: Full  Left Side Weight Bearing: Full  Base of Support: Widened  Speed/Karen: Slow  Step Length: Left shortened;Right shortened    Pain Ratin    Activity Tolerance: Fair    After treatment patient left in no apparent distress: Sitting in chair and Call bell within reach    COMMUNICATION/COLLABORATION:   The patients plan of care was discussed with: Registered nurse.      Zia Palencia PTA   Time Calculation: 32 mins

## 2021-05-28 NOTE — PROGRESS NOTES
Transition of Care Plan:     RUR:  15%     Disposition: Home with wife with home health services vs snf      Follow up appointments: To be made prior to discharge if going home.      DME needed: To be determined.      Transportation at [facility] vs [facility]".    [place] or means to access home:  Wife has keys.      IM Medicare letter: To be given prior to discharge.      Caregiver Contact: Wife (José Antonio Esquivel)     Discharge Caregiver contacted prior to discharge?   Caregiver to be contacted prior to discharge.        Patient transferred to room 2112 last pm. Handoff given to Lady Navjot CARLISLE to follow for dcp. Dora Cobb RN BSN CRM        [phone]

## 2021-05-29 LAB
ANION GAP SERPL CALC-SCNC: 6 MMOL/L (ref 5–15)
BASOPHILS # BLD: 0 K/UL (ref 0–0.1)
BASOPHILS NFR BLD: 0 % (ref 0–1)
BUN SERPL-MCNC: 18 MG/DL (ref 6–20)
BUN/CREAT SERPL: 14 (ref 12–20)
CALCIUM SERPL-MCNC: 7.7 MG/DL (ref 8.5–10.1)
CHLORIDE SERPL-SCNC: 102 MMOL/L (ref 97–108)
CO2 SERPL-SCNC: 27 MMOL/L (ref 21–32)
CREAT SERPL-MCNC: 1.27 MG/DL (ref 0.7–1.3)
DIFFERENTIAL METHOD BLD: ABNORMAL
EOSINOPHIL # BLD: 0.3 K/UL (ref 0–0.4)
EOSINOPHIL NFR BLD: 3 % (ref 0–7)
ERYTHROCYTE [DISTWIDTH] IN BLOOD BY AUTOMATED COUNT: 23.1 % (ref 11.5–14.5)
GLUCOSE BLD STRIP.AUTO-MCNC: 139 MG/DL (ref 65–117)
GLUCOSE BLD STRIP.AUTO-MCNC: 140 MG/DL (ref 65–117)
GLUCOSE BLD STRIP.AUTO-MCNC: 147 MG/DL (ref 65–117)
GLUCOSE BLD STRIP.AUTO-MCNC: 163 MG/DL (ref 65–117)
GLUCOSE BLD STRIP.AUTO-MCNC: 188 MG/DL (ref 65–117)
GLUCOSE SERPL-MCNC: 74 MG/DL (ref 65–100)
HCT VFR BLD AUTO: 25.2 % (ref 36.6–50.3)
HGB BLD-MCNC: 7.8 G/DL (ref 12.1–17)
IMM GRANULOCYTES # BLD AUTO: 0 K/UL (ref 0–0.04)
IMM GRANULOCYTES NFR BLD AUTO: 0 % (ref 0–0.5)
LYMPHOCYTES # BLD: 1.7 K/UL (ref 0.8–3.5)
LYMPHOCYTES NFR BLD: 15 % (ref 12–49)
MCH RBC QN AUTO: 23.9 PG (ref 26–34)
MCHC RBC AUTO-ENTMCNC: 31 G/DL (ref 30–36.5)
MCV RBC AUTO: 77.1 FL (ref 80–99)
MONOCYTES # BLD: 1.4 K/UL (ref 0–1)
MONOCYTES NFR BLD: 13 % (ref 5–13)
NEUTS SEG # BLD: 7.7 K/UL (ref 1.8–8)
NEUTS SEG NFR BLD: 69 % (ref 32–75)
NRBC # BLD: 0 K/UL (ref 0–0.01)
NRBC BLD-RTO: 0 PER 100 WBC
PLATELET # BLD AUTO: 207 K/UL (ref 150–400)
POTASSIUM SERPL-SCNC: 2.9 MMOL/L (ref 3.5–5.1)
RBC # BLD AUTO: 3.27 M/UL (ref 4.1–5.7)
RBC MORPH BLD: ABNORMAL
SERVICE CMNT-IMP: ABNORMAL
SODIUM SERPL-SCNC: 135 MMOL/L (ref 136–145)
WBC # BLD AUTO: 11.1 K/UL (ref 4.1–11.1)

## 2021-05-29 PROCEDURE — C9113 INJ PANTOPRAZOLE SODIUM, VIA: HCPCS | Performed by: STUDENT IN AN ORGANIZED HEALTH CARE EDUCATION/TRAINING PROGRAM

## 2021-05-29 PROCEDURE — 65270000029 HC RM PRIVATE

## 2021-05-29 PROCEDURE — 74011250637 HC RX REV CODE- 250/637: Performed by: STUDENT IN AN ORGANIZED HEALTH CARE EDUCATION/TRAINING PROGRAM

## 2021-05-29 PROCEDURE — 80048 BASIC METABOLIC PNL TOTAL CA: CPT

## 2021-05-29 PROCEDURE — 74011250637 HC RX REV CODE- 250/637: Performed by: NURSE PRACTITIONER

## 2021-05-29 PROCEDURE — 94760 N-INVAS EAR/PLS OXIMETRY 1: CPT

## 2021-05-29 PROCEDURE — 74011250637 HC RX REV CODE- 250/637: Performed by: INTERNAL MEDICINE

## 2021-05-29 PROCEDURE — 74011250636 HC RX REV CODE- 250/636: Performed by: STUDENT IN AN ORGANIZED HEALTH CARE EDUCATION/TRAINING PROGRAM

## 2021-05-29 PROCEDURE — 74011636637 HC RX REV CODE- 636/637: Performed by: STUDENT IN AN ORGANIZED HEALTH CARE EDUCATION/TRAINING PROGRAM

## 2021-05-29 PROCEDURE — 74011000250 HC RX REV CODE- 250: Performed by: STUDENT IN AN ORGANIZED HEALTH CARE EDUCATION/TRAINING PROGRAM

## 2021-05-29 PROCEDURE — 74011636637 HC RX REV CODE- 636/637: Performed by: INTERNAL MEDICINE

## 2021-05-29 PROCEDURE — 85025 COMPLETE CBC W/AUTO DIFF WBC: CPT

## 2021-05-29 PROCEDURE — 82962 GLUCOSE BLOOD TEST: CPT

## 2021-05-29 PROCEDURE — 36415 COLL VENOUS BLD VENIPUNCTURE: CPT

## 2021-05-29 PROCEDURE — 74011250636 HC RX REV CODE- 250/636: Performed by: INTERNAL MEDICINE

## 2021-05-29 PROCEDURE — 74011000258 HC RX REV CODE- 258: Performed by: INTERNAL MEDICINE

## 2021-05-29 RX ORDER — GABAPENTIN 300 MG/1
300 CAPSULE ORAL
Qty: 30 CAPSULE | Refills: 0 | Status: SHIPPED
Start: 2021-05-29 | End: 2021-05-30

## 2021-05-29 RX ORDER — CARVEDILOL 6.25 MG/1
6.25 TABLET ORAL 2 TIMES DAILY WITH MEALS
Qty: 60 TABLET | Refills: 1 | Status: SHIPPED | OUTPATIENT
Start: 2021-05-29 | End: 2021-07-28

## 2021-05-29 RX ORDER — LOSARTAN POTASSIUM 25 MG/1
12.5 TABLET ORAL DAILY
Qty: 15 TABLET | Refills: 0 | Status: SHIPPED | OUTPATIENT
Start: 2021-05-29 | End: 2021-06-28

## 2021-05-29 RX ORDER — CIPROFLOXACIN 500 MG/1
500 TABLET ORAL 2 TIMES DAILY
Qty: 8 TABLET | Refills: 0 | Status: SHIPPED | OUTPATIENT
Start: 2021-05-29 | End: 2021-06-02

## 2021-05-29 RX ORDER — POTASSIUM CHLORIDE 750 MG/1
20 TABLET, FILM COATED, EXTENDED RELEASE ORAL DAILY
Qty: 10 TABLET | Refills: 0 | Status: SHIPPED | OUTPATIENT
Start: 2021-05-29 | End: 2021-06-03

## 2021-05-29 RX ORDER — ASPIRIN 325 MG/1
100 TABLET, FILM COATED ORAL DAILY
Qty: 30 TABLET | Refills: 0 | Status: SHIPPED | OUTPATIENT
Start: 2021-05-30

## 2021-05-29 RX ORDER — POTASSIUM CHLORIDE 750 MG/1
40 TABLET, FILM COATED, EXTENDED RELEASE ORAL EVERY 6 HOURS
Status: COMPLETED | OUTPATIENT
Start: 2021-05-29 | End: 2021-05-29

## 2021-05-29 RX ORDER — PANTOPRAZOLE SODIUM 40 MG/1
40 GRANULE, DELAYED RELEASE ORAL
Qty: 60 EACH | Refills: 0 | Status: SHIPPED | OUTPATIENT
Start: 2021-05-29 | End: 2021-06-28

## 2021-05-29 RX ORDER — LACTULOSE 10 G/15ML
10 SOLUTION ORAL; RECTAL 2 TIMES DAILY
Qty: 480 ML | Refills: 1 | Status: SHIPPED | OUTPATIENT
Start: 2021-05-29 | End: 2021-06-30

## 2021-05-29 RX ADMIN — RIFAXIMIN 550 MG: 550 TABLET ORAL at 09:10

## 2021-05-29 RX ADMIN — INSULIN LISPRO 2 UNITS: 100 INJECTION, SOLUTION INTRAVENOUS; SUBCUTANEOUS at 09:10

## 2021-05-29 RX ADMIN — LACTULOSE 30 ML: 20 SOLUTION ORAL at 09:10

## 2021-05-29 RX ADMIN — CEFTRIAXONE 1 G: 1 INJECTION, POWDER, FOR SOLUTION INTRAMUSCULAR; INTRAVENOUS at 17:04

## 2021-05-29 RX ADMIN — SODIUM CHLORIDE 40 MG: 9 INJECTION, SOLUTION INTRAMUSCULAR; INTRAVENOUS; SUBCUTANEOUS at 22:08

## 2021-05-29 RX ADMIN — INSULIN GLARGINE 67 UNITS: 100 INJECTION, SOLUTION SUBCUTANEOUS at 09:11

## 2021-05-29 RX ADMIN — METOPROLOL TARTRATE 50 MG: 50 TABLET, FILM COATED ORAL at 17:04

## 2021-05-29 RX ADMIN — AMLODIPINE BESYLATE 10 MG: 5 TABLET ORAL at 09:10

## 2021-05-29 RX ADMIN — Medication 10 ML: at 06:12

## 2021-05-29 RX ADMIN — INSULIN LISPRO 4 UNITS: 100 INJECTION, SOLUTION INTRAVENOUS; SUBCUTANEOUS at 11:14

## 2021-05-29 RX ADMIN — ATORVASTATIN CALCIUM 10 MG: 10 TABLET, FILM COATED ORAL at 22:08

## 2021-05-29 RX ADMIN — RIFAXIMIN 550 MG: 550 TABLET ORAL at 17:04

## 2021-05-29 RX ADMIN — SODIUM CHLORIDE 40 MG: 9 INJECTION, SOLUTION INTRAMUSCULAR; INTRAVENOUS; SUBCUTANEOUS at 09:10

## 2021-05-29 RX ADMIN — Medication 10 ML: at 22:08

## 2021-05-29 RX ADMIN — INSULIN LISPRO 2 UNITS: 100 INJECTION, SOLUTION INTRAVENOUS; SUBCUTANEOUS at 11:14

## 2021-05-29 RX ADMIN — INSULIN LISPRO 4 UNITS: 100 INJECTION, SOLUTION INTRAVENOUS; SUBCUTANEOUS at 17:05

## 2021-05-29 RX ADMIN — LACTULOSE 30 ML: 20 SOLUTION ORAL at 17:05

## 2021-05-29 RX ADMIN — HYDROCHLOROTHIAZIDE 12.5 MG: 25 TABLET ORAL at 09:11

## 2021-05-29 RX ADMIN — Medication 1000 UNITS: at 09:10

## 2021-05-29 RX ADMIN — Medication 100 MG: at 09:11

## 2021-05-29 RX ADMIN — Medication 10 ML: at 13:18

## 2021-05-29 RX ADMIN — POTASSIUM CHLORIDE 40 MEQ: 750 TABLET, FILM COATED, EXTENDED RELEASE ORAL at 06:12

## 2021-05-29 RX ADMIN — INSULIN LISPRO 4 UNITS: 100 INJECTION, SOLUTION INTRAVENOUS; SUBCUTANEOUS at 09:10

## 2021-05-29 RX ADMIN — INSULIN LISPRO 2 UNITS: 100 INJECTION, SOLUTION INTRAVENOUS; SUBCUTANEOUS at 17:05

## 2021-05-29 RX ADMIN — POTASSIUM CHLORIDE 40 MEQ: 750 TABLET, FILM COATED, EXTENDED RELEASE ORAL at 11:15

## 2021-05-29 RX ADMIN — METOPROLOL TARTRATE 50 MG: 50 TABLET, FILM COATED ORAL at 09:10

## 2021-05-29 NOTE — PROGRESS NOTES
Hospitalist Progress Note    NAME: Megha Easley   :  1960   MRN:  [id number]     Admit date: 2021    Today's date: 21    PCP: Caden Jones MD      Assessment / Plan:  Acute blood less anemia POA HgB 15.5 to 6.2  Esophageal varices POA  Upper GI bleed POA  Alcohol-induced cirrhosis POA  Alcohol abuse POA  - HgB at PCPs office was low 6.2,  sent to ED  -CT abdomen/pelvis with nodular liver, suggestive of cirrhosis        No bleeding source  -EGD  with Dr Hi Jose       Large varices with red shalini stigmata and 'white nipple'                 Suggests very recent active bleeding       6 bands were placed  - s/p 5 units PRBC  - octreotide gtt, received for 4 days, stop right now  -IV Protonix twice daily  - IV ceftriaxone for prophylaxis, will switch to ciprofloxacin on discharge  -[facility] protocol  -Hold the Plavix and the aspirin for 7-10 days          Patient taken this because of the CVA with right-sided deficit    -Discontinued octreotide drip, on beta-blocker. Continue to hold antiplatelets      AMS  Hepatic encephalopathy, improving/resolved  On , he became more confused, altered, encephalopathic, improved with p.o. 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     Code Status: Full code  Surrogate Decision Maker: Wife     DVT Prophylaxis: SCD  GI Prophylaxis: not indicated     Baseline: Ambulatory at home with assistance   Recommended disposition: Home with home health    Initially physical therapy recommended SNF, was reassessed today and feels that he can safely go to home with home health. Discussed with the patient and the wife yesterday, they were agreeable for home health. Yesterday case management  attempted to get the home health can come to their home and provide services.    -This a.m.,  has discussed with the wife, they would want patient to go to SNF, has wife says it would be difficult for her to take care of him. Subjective:     Chief Complaint / Reason for Physician Visit f/u Gi bleed  No events overnight. Tolerating his diet. Review of Systems:  Symptom Y/N Comments  Symptom Y/N Comments   Fever/Chills n   Chest Pain n    Poor Appetite    Edema     Cough n   Abdominal Pain n    Sputum    Joint Pain     SOB/SHEPHERD n   Headache     Nausea/vomit n   Tolerating PT/OT     Diarrhea n   Tolerating Diet y    Constipation    Other       Could NOT obtain due to:      Objective:     VITALS:   Last 24hrs VS reviewed since prior progress note.  Most recent are:  Patient Vitals for the past 24 hrs:   Temp Pulse Resp BP SpO2   05/29/21 1500 98.1 °F (36.7 °C) 75 18 121/65 97 %   05/29/21 1101 98.3 °F (36.8 °C) 77 18 (!) 119/56 98 %   05/29/21 0745 98.6 °F (37 °C) 76 18 118/65 99 %   05/29/21 0312 99.2 °F (37.3 °C) 75 18 134/65 97 %   05/29/21 0008 98.1 °F (36.7 °C) 73 18 129/68 99 %   05/28/21 2009 98.7 °F (37.1 °C) 73 18 135/62 98 %       Intake/Output Summary (Last 24 hours) at 5/29/2021 1532  Last data filed at 5/29/2021 0009  Gross per 24 hour   Intake --   Output 850 ml   Net -850 ml        Wt Readings from Last 12 Encounters:   05/23/21 156 kg (343 lb 14.7 oz)   04/19/18 151.5 kg (334 lb)   09/25/17 (!) 158.8 kg (350 lb 3.2 oz)   03/27/17 (!) 163 kg (359 lb 4.8 oz)   11/28/16 (!) 167.1 kg (368 lb 6.4 oz)   06/28/16 (!) 167.4 kg (369 lb)   03/28/16 (!) 167.5 kg (369 lb 3.2 oz)   12/29/15 (!) 167.9 kg (370 lb 3.2 oz)   10/19/15 (!) 164.7 kg (363 lb)   08/27/15 (!) 160.4 kg (353 lb 9.6 oz)   05/29/15 156.5 kg (345 lb)   05/20/15 154.2 kg (340 lb)       PHYSICAL EXAM:    I had a face to face encounter and independently examined this patient on 05/29/21 as outlined below:    General: WD, WN. Alert, cooperative, no acute distress    EENT:  PERRL. Anicteric sclerae. Pale MM  Resp:  CTA bilaterally, no wheezing or rales. No accessory muscle use  CV:  Regular  rhythm,  No edema  GI:  Soft, Non distended, Non tender. +Bowel sounds, no rebound  LN:  No cervical or inguinal DANA  Neurologic:  Alert and oriented X 3, normal speech, non focal motor exam  Psych:   Good insight. Not anxious nor agitated  Skin:  No rashes. No jaundice    Reviewed most current lab test results and cultures  YES  Reviewed most current radiology test results   YES  Review and summation of old records today    NO  Reviewed patient's current orders and MAR    YES  PMH/ reviewed - no change compared to H&P  ________________________________________________________________________  Care Plan discussed with:    Comments   Patient x    Family      RN x    Care Manager     Consultant                        Multidiciplinary team rounds were held today with , nursing, pharmacist and clinical coordinator. Patient's plan of care was discussed; medications were reviewed and discharge planning was addressed. ________________________________________________________________________      Comments   >50% of visit spent in counseling and coordination of care     ________________________________________________________________________  Eliud Bello MD     Procedures: see electronic medical records for all procedures/Xrays and details which were not copied into this note but were reviewed prior to creation of Plan.       LABS:  I reviewed today's most current labs and imaging studies.   Pertinent labs include:  Recent Labs     05/29/21 0101 05/28/21  0348 05/27/21  1630   WBC 11.1  --   --    HGB 7.8* 7.9* 8.4*   HCT 25.2* 25.2* 26.6*     --   --      Recent Labs     05/29/21 0101 05/28/21 0348 05/27/21  0314   *  --  136   K 2.9*  --  3.1*     --  104   CO2 27  --  28   GLU 74  --  117*   BUN 18  --  17   CREA 1.27  --  1.06   CA 7.7*  --  8.0*   MG  --   --  1.6   INR  --  1.2* 1.2*

## 2021-05-29 NOTE — PROGRESS NOTES
Problem: Falls - Risk of  Goal: *Absence of Falls  Description: Document Lear Shaker Fall Risk and appropriate interventions in the flowsheet. Outcome: Progressing Towards Goal  Note: Fall Risk Interventions:  Mobility Interventions: Bed/chair exit alarm, OT consult for ADLs, Patient to call before getting OOB, PT Consult for mobility concerns    Mentation Interventions: Bed/chair exit alarm, Increase mobility, More frequent rounding, Reorient patient    Medication Interventions: Bed/chair exit alarm, Patient to call before getting OOB, Teach patient to arise slowly    Elimination Interventions: Bed/chair exit alarm, Call light in reach, Patient to call for help with toileting needs              Problem: Patient Education: Go to Patient Education Activity  Goal: Patient/Family Education  Outcome: Progressing Towards Goal     Problem: Pressure Injury - Risk of  Goal: *Prevention of pressure injury  Description: Document Power Scale and appropriate interventions in the flowsheet. Outcome: Progressing Towards Goal  Note: Pressure Injury Interventions:  Sensory Interventions: Assess changes in LOC, Turn and reposition approx. every two hours (pillows and wedges if needed)    Moisture Interventions: Absorbent underpads, Internal/External urinary devices, Maintain skin hydration (lotion/cream), Minimize layers    Activity Interventions: Increase time out of bed, Pressure redistribution bed/mattress(bed type), PT/OT evaluation    Mobility Interventions: HOB 30 degrees or less, Pressure redistribution bed/mattress (bed type), PT/OT evaluation, Turn and reposition approx.  every two hours(pillow and wedges)    Nutrition Interventions: Document food/fluid/supplement intake    Friction and Shear Interventions: HOB 30 degrees or less, Lift sheet                Problem: Patient Education: Go to Patient Education Activity  Goal: Patient/Family Education  Outcome: Progressing Towards Goal     Problem: Patient Education: Go to Patient Education Activity  Goal: Patient/Family Education  Outcome: Progressing Towards Goal     Problem: Patient Education: Go to Patient Education Activity  Goal: Patient/Family Education  Outcome: Progressing Towards Goal

## 2021-05-29 NOTE — PROGRESS NOTES
Problem: Falls - Risk of  Goal: *Absence of Falls  Description: Document Angel Morse Fall Risk and appropriate interventions in the flowsheet. 5/29/2021 1320 by Griselda Hark, RN  Outcome: Progressing Towards Goal  Note: Fall Risk Interventions:  Mobility Interventions: Bed/chair exit alarm, OT consult for ADLs, Patient to call before getting OOB, PT Consult for mobility concerns    Mentation Interventions: Bed/chair exit alarm, Increase mobility, More frequent rounding, Reorient patient    Medication Interventions: Bed/chair exit alarm, Patient to call before getting OOB, Teach patient to arise slowly    Elimination Interventions: Bed/chair exit alarm, Call light in reach, Patient to call for help with toileting needs           5/29/2021 1206 by Griselda Hark, RN  Outcome: Progressing Towards Goal  Note: Fall Risk Interventions:  Mobility Interventions: Bed/chair exit alarm, OT consult for ADLs, Patient to call before getting OOB, PT Consult for mobility concerns    Mentation Interventions: Bed/chair exit alarm, Increase mobility, More frequent rounding, Reorient patient    Medication Interventions: Bed/chair exit alarm, Patient to call before getting OOB, Teach patient to arise slowly    Elimination Interventions: Bed/chair exit alarm, Call light in reach, Patient to call for help with toileting needs              Problem: Patient Education: Go to Patient Education Activity  Goal: Patient/Family Education  5/29/2021 1320 by Griselda Hark, RN  Outcome: Progressing Towards Goal  5/29/2021 1206 by Griselda Hark, RN  Outcome: Progressing Towards Goal     Problem: Pressure Injury - Risk of  Goal: *Prevention of pressure injury  Description: Document Power Scale and appropriate interventions in the flowsheet. 5/29/2021 1320 by Griselda Hark, RN  Outcome: Progressing Towards Goal  Note: Pressure Injury Interventions:  Sensory Interventions: Assess changes in LOC, Turn and reposition approx.  every two hours (pillows and wedges if needed)    Moisture Interventions: Absorbent underpads, Internal/External urinary devices, Maintain skin hydration (lotion/cream), Minimize layers    Activity Interventions: Increase time out of bed, Pressure redistribution bed/mattress(bed type), PT/OT evaluation    Mobility Interventions: HOB 30 degrees or less, Pressure redistribution bed/mattress (bed type), PT/OT evaluation, Turn and reposition approx. every two hours(pillow and wedges)    Nutrition Interventions: Document food/fluid/supplement intake    Friction and Shear Interventions: HOB 30 degrees or less, Lift sheet             5/29/2021 1206 by Kelly Montalvo RN  Outcome: Progressing Towards Goal  Note: Pressure Injury Interventions:  Sensory Interventions: Assess changes in LOC, Turn and reposition approx. every two hours (pillows and wedges if needed)    Moisture Interventions: Absorbent underpads, Internal/External urinary devices, Maintain skin hydration (lotion/cream), Minimize layers    Activity Interventions: Increase time out of bed, Pressure redistribution bed/mattress(bed type), PT/OT evaluation    Mobility Interventions: HOB 30 degrees or less, Pressure redistribution bed/mattress (bed type), PT/OT evaluation, Turn and reposition approx.  every two hours(pillow and wedges)    Nutrition Interventions: Document food/fluid/supplement intake    Friction and Shear Interventions: HOB 30 degrees or less, Lift sheet                Problem: Patient Education: Go to Patient Education Activity  Goal: Patient/Family Education  5/29/2021 1320 by Kelly Montalvo RN  Outcome: Progressing Towards Goal  5/29/2021 1206 by Kelly Montalvo RN  Outcome: Progressing Towards Goal     Problem: Patient Education: Go to Patient Education Activity  Goal: Patient/Family Education  5/29/2021 1320 by Kelly Montalvo RN  Outcome: Progressing Towards Goal  5/29/2021 1206 by Kelly Montalvo RN  Outcome: Progressing Towards Goal     Problem: Patient Education: Go to Patient Education Activity  Goal: Patient/Family Education  5/29/2021 1320 by Hong Galarza RN  Outcome: Progressing Towards Goal  5/29/2021 1206 by Hong Galarza RN  Outcome: Progressing Towards Goal

## 2021-05-29 NOTE — DISCHARGE SUMMARY
Hospitalist Discharge Summary     Patient ID:  Alissa Mcfarland  [id number]  8 y.o.  1960 5/23/2021    PCP on record: Amarilis Gatica MD    Admit date: 5/23/2021  Discharge date and time: 5/30/2021    DISCHARGE DIAGNOSIS:    Acute blood less anemia   Esophageal varices   Upper GI bleed POA  Alcohol-induced cirrhosis POA  Alcohol abuse POA  Hepatic encephalopathy  Acute kidney injury POA  History of CVA with right residual deficit  Hyperlipidemia  Hypertension               CONSULTATIONS:  IP CONSULT TO GASTROENTEROLOGY    Excerpted HPI from H&P of Saúl De Los Santos MD:  Alissa Mcfarland is a 57 y.o.  male who presents with low hemoglobin and rectal bleeding. Patient past medical history of hypertension, insulin-dependent diabetes type 2, CVA, hyperlipidemia, neuropathy, alcohol abuse. Patient stated that he has noticed some blood in the stool 3 days ago and it has cleared and currently the stool is brown in color. Patient went to see the PCP and found out that his hemoglobin is low 6.5 and so the patient was sent to the ER for further evaluation. While at the [facility] patient hemoglobin was 6.1 and the patient was transferred to  [facility] for higher care. Patient denies any abdominal pain, no nausea vomiting, no blood in the urine, no fever and chills, no other complaints. Patient has a history of iron deficiency but not taking any replacement.    ______________________________________________________________________  DISCHARGE SUMMARY/HOSPITAL COURSE:  for full details see H&P, daily progress notes, labs, consult notes.        Acute blood less anemia POA HgB 15.5 to 6.2  Esophageal varices POA  Upper GI bleed POA  Alcohol-induced cirrhosis POA  Alcohol abuse POA  - HgB at PCPs office was low 6.2,  sent to ED  -CT abdomen/pelvis with nodular liver, suggestive of cirrhosis        No bleeding source  -EGD 5/24 with Dr Getachew Mosqueda       Large varices with red shalini stigmata and 'white hetalple'                 Suggests very recent active bleeding       6 bands were placed  - s/p 5 units PRBC  - octreotide gtt, has received for around 3 to 4 days, no further signs of GI bleed, was discontinued  -IV Protonix twice daily, switch to p.o.  - IV ceftriaxone for prophylaxis , changed to p.o. ciprofloxacin on discharge  -CIWA protocol,  remain low CIWA score, did not require lorazepam  -Hold the Plavix      - on beta-blocker.      -Resume Plavix from June 2, 2021        AMS  Hepatic encephalopathy, improving/resolved  On 5/25, he became more confused, altered, encephalopathic, improved with p.o. 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 Pertinent Findings:  Gen:    Not in distress  Chest: Clear lungs  CVS:   Regular rhythm. No edema  Abd:  Soft, not distended, not tender  Neuro:  Alert,   _______________________________________________________________________  DISCHARGE MEDICATIONS:   Current Discharge Medication List      START taking these medications    Details   carvediloL (Coreg) 6.25 mg tablet Take 1 Tablet by mouth two (2) times daily (with meals) for 60 days.   Qty: 60 Tablet, Refills: 1  Start date: 5/29/2021, End date: 7/28/2021      thiamine mononitrate (B-1) 100 mg tablet Take 1 Tablet by mouth daily. Qty: 30 Tablet, Refills: 0  Start date: 5/30/2021      rifAXIMin (XIFAXAN) 550 mg tablet Take 1 Tablet by mouth two (2) times a day. Qty: 60 Tablet, Refills: 1  Start date: 5/29/2021      lactulose (CHRONULAC) 10 gram/15 mL solution Take 15 mL by mouth two (2) times a day for 32 days. Qty: 480 mL, Refills: 1  Start date: 5/29/2021, End date: 6/30/2021    Comments: titrate to 3-4 bms per day      ciprofloxacin HCl (Cipro) 500 mg tablet Take 1 Tablet by mouth two (2) times a day for 4 days. Qty: 8 Tablet, Refills: 0  Start date: 5/29/2021, End date: 6/2/2021      potassium chloride SR (KLOR-CON 10) 10 mEq tablet Take 2 Tablets by mouth daily for 5 days. Qty: 10 Tablet, Refills: 0  Start date: 5/29/2021, End date: 6/3/2021      pantoprazole (PROTONIX) 40 mg granules for oral suspension Take 40 mg by mouth Before breakfast and dinner for 30 days. Qty: 60 Each, Refills: 0  Start date: 5/29/2021, End date: 6/28/2021         CONTINUE these medications which have CHANGED    Details   insulin NPH/insulin regular (NOVOLIN 70/30, HUMULIN 70/30) 100 unit/mL (70-30) injection Take 50 Units at 7 AM and 5 PM.  Qty: 6 Vial, Refills: 6  Start date: 5/29/2021      losartan (COZAAR) 25 mg tablet Take 0.5 Tablets by mouth daily for 30 days. Qty: 15 Tablet, Refills: 0  Start date: 5/29/2021, End date: 6/28/2021         CONTINUE these medications which have NOT CHANGED    Details   glucose blood VI test strips (ACCU-CHEK SMARTVIEW TEST STRIP) strip TEST 2 TO 3 TIMES DAILY  Qty: 300 Strip, Refills: 3    Associated Diagnoses: Type 2 diabetes mellitus with diabetic polyneuropathy, with long-term current use of insulin (HCC)      amLODIPine (NORVASC) 10 mg tablet Take 1 Tab by mouth daily.   Qty: 90 Tab, Refills: 2      BD INSULIN SYRINGE 1 mL 28 gauge x 1/2\" syrg Use two times per day with insulin  DX: E11.42 (patient wanted longer needle)  Qty: 100 Syringe, Refills: 11      insulin syringe-needle U-100 1 mL 31 gauge x 15/64\" syrg 1 Syringe by Does Not Apply route two (2) times a day. Use to inject insulin two times per day. DX: E11.42  Qty: 200 Pen Needle, Refills: 3      atorvastatin (LIPITOR) 10 mg tablet Take 1 Tab by mouth nightly. Qty: 90 Tab, Refills: 3      metFORMIN (GLUCOPHAGE) 1,000 mg tablet Take 1 Tab by mouth two (2) times daily (with meals). Indications: TYPE 2 DIABETES MELLITUS  Qty: 180 Tab, Refills: 3      aspirin delayed-release 81 mg tablet Take 1 Tab by mouth daily. Qty: 30 Tab, Refills: 11    Associated Diagnoses: Stroke determined by clinical assessment ([facility] [address] Spastic hemiparesis affecting dominant side ([address] Expressive aphasia; Ataxia; Type II or unspecified type diabetes mellitus with neurological manifestations, not stated as uncontrolled(250.60) ([address] Polyneuropathy in diabetes(357.2); Essential hypertension; Hyperlipidemia LDL goal < 100      Multivit,Ca,Iron-FA-Lyco-Lut (CENTRAL-DUY) -451-250 mg-mcg-mcg-mcg tab Take  by mouth. Associated Diagnoses: CVA (cerebral infarction); Type II or unspecified type diabetes mellitus with neurological manifestations, not stated as uncontrolled(250.60) ([address] Seizure ([address] Diabetes mellitus ([address] Polyneuropathy in diabetes(357.2)      Cholecalciferol, Vitamin D3, (VITAMIN D3) 1,000 unit cap Take  by mouth. Associated Diagnoses: CVA (cerebral infarction); Type II or unspecified type diabetes mellitus with neurological manifestations, not stated as uncontrolled(250.60) ([address] Seizure ([address] Diabetes mellitus ([address]  Polyneuropathy in diabetes(357.2)         STOP taking these medications       gabapentin (NEURONTIN) 300 mg capsule Comments:   Reason for Stopping:         clopidogrel (PLAVIX) 75 mg tab Comments:   Reason for Stopping:         Hydrochlorothiazide 12.5 mg tablet Comments:   Reason for Stopping:         metoprolol (LOPRESSOR) 50 mg tablet Comments:   Reason for Stopping:                 Patient Follow Up Instructions: Activity: PT/OT Eval and Treat  Diet: Cardiac Diet  Wound Care: None needed        Follow-up Information     Follow up With Specialties Details Why Contact Alex Bermudez MD Gastroenterology In 3 weeks for GI follow up appt. [address]  [address] [facility]      Reji Van MD Family Medicine In 1 week for PCP follow up appt after rehab. [address] [place] [facility]  this is your rehab provider.   [place]  [address]  [phone]        ________________________________________________________________    Risk of deterioration: Moderate    Condition at Discharge:  Stable  __________________________________________________________________    Disposition  SNF/LTC    ____________________________________________________________________    Code Status: Full Code  ___________________________________________________________________      Total time in minutes spent coordinating this discharge (includes going over instructions, follow-up, prescriptions, and preparing report for sign off to her PCP) :  >30 minutes    Signed:  Leigh Ann Ramon MD

## 2021-05-29 NOTE — DISCHARGE INSTRUCTIONS
HOSPITALIST DISCHARGE INSTRUCTIONS    NAME: Leigha Ramos   :  1960   MRN:  [id number]     Date/Time:  2021 9:59 AM    ADMIT DATE: 2021   DISCHARGE DATE: 2021     Acute blood less anemia   Esophageal varices   Upper GI bleed POA  Alcohol-induced cirrhosis POA  Alcohol abuse POA  Hepatic encephalopathy  Acute kidney injury POA  History of CVA with right residual deficit  Hyperlipidemia  Hypertension    -Start taking Plavix from 2021      · It is important that you take the medication exactly as they are prescribed. · Keep your medication in the bottles provided by the pharmacist and keep a list of the medication names, dosages, and times to be taken in your wallet. · Do not take other medications without consulting your doctor. What to do at [address]:  Cardiac Diet    Recommended activity: Activity as tolerated      If you have questions regarding the hospital related prescriptions or hospital related issues please call [facility] at [phone]. You can always direct your questions to your primary care doctor if you are unable to reach your hospital physician; your PCP works as an extension of your hospital doctor just like your hospital doctor is an extension of your PCP for your time at the hospital [facility])    If you experience any of the following symptoms then please call your primary care physician or return to the emergency room if you cannot get hold of your doctor:    Fever, chills, nausea, vomiting, or persistent diarrhea  Worsening weakness or new problems with your speech or balance  Dark stools or visible blood in your stools  New Leg swelling or shortness of breath as these could be signs of a clot    Additional Instructions:      Bring these papers with you to your follow up appointments.  The papers will help your doctors be sure to continue the care plan from the hospital.              Information obtained by :  I understand that if any problems occur once I am at home I am to contact my physician. I understand and acknowledge receipt of the instructions indicated above.                                                                                                                                            Physician's or R.N.'s Signature                                                                  Date/Time                                                                                                                                              Patient or Representative Signature

## 2021-05-29 NOTE — PROGRESS NOTES
Received notification from bedside RN about patient with regards to: K+ 2.9, needs order to replete  VS: /68, HR 73, RR 18, O2 sat 99% on RA    Intervention given: Kdur 40 meq x 2 doses ordered

## 2021-05-29 NOTE — PROGRESS NOTES
Transition of Care Plan:    RUR: 15%   Disposition: SNF - will need Delmi Mendoza - Ref: [id number] - clinicals sent FAX'd - [phone] for review  - [facility] reviewing for acceptance  Follow up appointments: per SNF  DME needed:patient has a cane - other DME per SNF  Transportation at Discharge: wife  Keyla Greenberg or means to access home:     N/A - family has keys to home   IM Medicare letter: 2nd IM letter provided to patient and signed copy on chart  Caregiver Contact: Wife Yohannes Fuentes - [phone]  Discharge Caregiver contacted prior to discharge? Wife at bedside - patient now asking to go to SNF as his wife is working and CM has been unsuccessful with obtaining home health provider. Wife states \"we have never been able to find anyone to come to our home for therapy\". Patient provided [address] and referrals sent to [address] [place] [address]. [address] [facility] returned phone call and is reviewing for acceptance. Clinical updates sent to [facility] for [place] auth. Ref #[id number].       Julissa Solomon, RN, BSN, [facility]    Coordinator  [phone]

## 2021-05-29 NOTE — PROGRESS NOTES
End of Shift Note    Bedside shift change report given to LATOYA Biggs (oncoming nurse) by Nestor Marques RN (offgoing nurse). Report included the following information SBAR, Kardex, Intake/Output, MAR and Recent Results    Shift worked:  8695-3391     Shift summary and any significant changes:     Pt up to chair watching TV; pt's wife visited today. Pt tolerating diet. Concerns for physician to address:  none     Zone phone for oncoming shift:          Activity:  Activity Level: Bed Rest  Number times ambulated in hallways past shift: 0  Number of times OOB to chair past shift: 3    Cardiac:   Cardiac Monitoring: No      Cardiac Rhythm: Sinus Rhythm    Access:   Current line(s): PIV     Genitourinary:   Urinary status: voiding    Respiratory:   O2 Device: None (Room air)  Chronic home O2 use?: NO  Incentive spirometer at bedside: NO     GI:  Last Bowel Movement Date: 05/28/21  Current diet:  DIET GI LITE (POST SURGICAL)  Passing flatus: YES  Tolerating current diet: YES       Pain Management:   Patient states pain is manageable on current regimen: YES    Skin:  Power Score: 18  Interventions: float heels and increase time out of bed    Patient Safety:  Fall Score:  Total Score: 3  Interventions: bed/chair alarm, assistive device (walker, cane, etc), gripper socks, pt to call before getting OOB and stay with me (per policy)  High Fall Risk: Yes    Length of Stay:  Expected LOS: 3d 2h  Actual LOS: 6      Nestor Marques RN

## 2021-05-29 NOTE — PROGRESS NOTES
End of Shift Note    Bedside shift change report given to [address] (oncoming nurse) by ALLISON Dickens (offgoing nurse). Report included the following information SBAR, Kardex, ED Summary, Intake/Output, MAR and Recent Results    Shift worked:  9977-9514     Shift summary and any significant changes:     Pt tolerated all aspects of care throughout shift. Pt had no complaints of pain. Pt was able to sleep for a few hours. Pt states he is ready to leave     Concerns for physician to address:  none     Zone phone for oncoming shift:          Activity:  Activity Level: Bed Rest  Number times ambulated in hallways past shift: 0  Number of times OOB to chair past shift: 0    Cardiac:   Cardiac Monitoring: No      Cardiac Rhythm: Sinus Rhythm    Access:   Current line(s): PIV     Genitourinary:   Urinary status: voiding and incontinent    Respiratory:   O2 Device: None (Room air)  Chronic home O2 use?: NO  Incentive spirometer at bedside: YES     GI:  Last Bowel Movement Date: 05/27/21  Current diet:  DIET GI LITE (POST SURGICAL)  Passing flatus: YES  Tolerating current diet: YES       Pain Management:   Patient states pain is manageable on current regimen: YES    Skin:  Power Score: 15  Interventions: turn team, speciality bed, float heels and PT/OT consult    Patient Safety:  Fall Score:  Total Score: 3  Interventions: assistive device (walker, cane, etc), gripper socks, pt to call before getting OOB and stay with me (per policy)  High Fall Risk: Yes    Length of Stay:  Expected LOS: 3d 2h  Actual LOS: ALLISON Nuñez

## 2021-05-29 NOTE — PROGRESS NOTES
Problem: Falls - Risk of  Goal: *Absence of Falls  Description: Document Martita Wagoner Fall Risk and appropriate interventions in the flowsheet. 5/29/2021 1320 by Александр Urbina RN  Outcome: Resolved/Met  5/29/2021 1320 by Александр Urbina RN  Outcome: Progressing Towards Goal  Note: Fall Risk Interventions:  Mobility Interventions: Bed/chair exit alarm, OT consult for ADLs, Patient to call before getting OOB, PT Consult for mobility concerns    Mentation Interventions: Bed/chair exit alarm, Increase mobility, More frequent rounding, Reorient patient    Medication Interventions: Bed/chair exit alarm, Patient to call before getting OOB, Teach patient to arise slowly    Elimination Interventions: Bed/chair exit alarm, Call light in reach, Patient to call for help with toileting needs           5/29/2021 1206 by Александр Urbina RN  Outcome: Progressing Towards Goal  Note: Fall Risk Interventions:  Mobility Interventions: Bed/chair exit alarm, OT consult for ADLs, Patient to call before getting OOB, PT Consult for mobility concerns    Mentation Interventions: Bed/chair exit alarm, Increase mobility, More frequent rounding, Reorient patient    Medication Interventions: Bed/chair exit alarm, Patient to call before getting OOB, Teach patient to arise slowly    Elimination Interventions: Bed/chair exit alarm, Call light in reach, Patient to call for help with toileting needs              Problem: Patient Education: Go to Patient Education Activity  Goal: Patient/Family Education  5/29/2021 1320 by Александр Urbina RN  Outcome: Resolved/Met  5/29/2021 1320 by Александр Urbina RN  Outcome: Progressing Towards Goal  5/29/2021 1206 by Александр Urbina RN  Outcome: Progressing Towards Goal     Problem: Pressure Injury - Risk of  Goal: *Prevention of pressure injury  Description: Document Power Scale and appropriate interventions in the flowsheet.   5/29/2021 1320 by Александр Urbina RN  Outcome: Resolved/Met  5/29/2021 1320 by Bharathi Barrera RN  Outcome: Progressing Towards Goal  Note: Pressure Injury Interventions:  Sensory Interventions: Assess changes in LOC, Turn and reposition approx. every two hours (pillows and wedges if needed)    Moisture Interventions: Absorbent underpads, Internal/External urinary devices, Maintain skin hydration (lotion/cream), Minimize layers    Activity Interventions: Increase time out of bed, Pressure redistribution bed/mattress(bed type), PT/OT evaluation    Mobility Interventions: HOB 30 degrees or less, Pressure redistribution bed/mattress (bed type), PT/OT evaluation, Turn and reposition approx. every two hours(pillow and wedges)    Nutrition Interventions: Document food/fluid/supplement intake    Friction and Shear Interventions: HOB 30 degrees or less, Lift sheet             5/29/2021 1206 by Bharathi Barrera RN  Outcome: Progressing Towards Goal  Note: Pressure Injury Interventions:  Sensory Interventions: Assess changes in LOC, Turn and reposition approx. every two hours (pillows and wedges if needed)    Moisture Interventions: Absorbent underpads, Internal/External urinary devices, Maintain skin hydration (lotion/cream), Minimize layers    Activity Interventions: Increase time out of bed, Pressure redistribution bed/mattress(bed type), PT/OT evaluation    Mobility Interventions: HOB 30 degrees or less, Pressure redistribution bed/mattress (bed type), PT/OT evaluation, Turn and reposition approx.  every two hours(pillow and wedges)    Nutrition Interventions: Document food/fluid/supplement intake    Friction and Shear Interventions: HOB 30 degrees or less, Lift sheet                Problem: Patient Education: Go to Patient Education Activity  Goal: Patient/Family Education  5/29/2021 1320 by Bharathi Barrera RN  Outcome: Resolved/Met  5/29/2021 1320 by Bharathi Barrera RN  Outcome: Progressing Towards Goal  5/29/2021 1206 by Bharathi Barrera RN  Outcome: Progressing Towards Goal     Problem: Patient Education: Go to Patient Education Activity  Goal: Patient/Family Education  5/29/2021 1320 by Wei Cummings RN  Outcome: Resolved/Met  5/29/2021 1320 by Wei Cummings RN  Outcome: Progressing Towards Goal  5/29/2021 1206 by Wei Cummings RN  Outcome: Progressing Towards Goal     Problem: Patient Education: Go to Patient Education Activity  Goal: Patient/Family Education  5/29/2021 1320 by Wei Cummings RN  Outcome: Resolved/Met  5/29/2021 1320 by Wei Cummings RN  Outcome: Progressing Towards Goal  5/29/2021 1206 by Wei Cummings RN  Outcome: Progressing Towards Goal

## 2021-05-30 VITALS
BODY MASS INDEX: 40.43 KG/M2 | WEIGHT: 315 LBS | SYSTOLIC BLOOD PRESSURE: 140 MMHG | HEART RATE: 73 BPM | DIASTOLIC BLOOD PRESSURE: 78 MMHG | RESPIRATION RATE: 18 BRPM | HEIGHT: 74 IN | OXYGEN SATURATION: 98 % | TEMPERATURE: 98.7 F

## 2021-05-30 LAB
ANION GAP SERPL CALC-SCNC: 6 MMOL/L (ref 5–15)
BUN SERPL-MCNC: 18 MG/DL (ref 6–20)
BUN/CREAT SERPL: 13 (ref 12–20)
CALCIUM SERPL-MCNC: 7.6 MG/DL (ref 8.5–10.1)
CHLORIDE SERPL-SCNC: 103 MMOL/L (ref 97–108)
CO2 SERPL-SCNC: 27 MMOL/L (ref 21–32)
CREAT SERPL-MCNC: 1.38 MG/DL (ref 0.7–1.3)
GLUCOSE BLD STRIP.AUTO-MCNC: 143 MG/DL (ref 65–117)
GLUCOSE BLD STRIP.AUTO-MCNC: 221 MG/DL (ref 65–117)
GLUCOSE SERPL-MCNC: 126 MG/DL (ref 65–100)
POTASSIUM SERPL-SCNC: 3 MMOL/L (ref 3.5–5.1)
SERVICE CMNT-IMP: ABNORMAL
SERVICE CMNT-IMP: ABNORMAL
SODIUM SERPL-SCNC: 136 MMOL/L (ref 136–145)

## 2021-05-30 PROCEDURE — 82962 GLUCOSE BLOOD TEST: CPT

## 2021-05-30 PROCEDURE — 74011000250 HC RX REV CODE- 250: Performed by: STUDENT IN AN ORGANIZED HEALTH CARE EDUCATION/TRAINING PROGRAM

## 2021-05-30 PROCEDURE — 74011250637 HC RX REV CODE- 250/637: Performed by: INTERNAL MEDICINE

## 2021-05-30 PROCEDURE — 74011636637 HC RX REV CODE- 636/637: Performed by: STUDENT IN AN ORGANIZED HEALTH CARE EDUCATION/TRAINING PROGRAM

## 2021-05-30 PROCEDURE — 94760 N-INVAS EAR/PLS OXIMETRY 1: CPT

## 2021-05-30 PROCEDURE — 74011250637 HC RX REV CODE- 250/637: Performed by: STUDENT IN AN ORGANIZED HEALTH CARE EDUCATION/TRAINING PROGRAM

## 2021-05-30 PROCEDURE — 74011250636 HC RX REV CODE- 250/636: Performed by: STUDENT IN AN ORGANIZED HEALTH CARE EDUCATION/TRAINING PROGRAM

## 2021-05-30 PROCEDURE — 80048 BASIC METABOLIC PNL TOTAL CA: CPT

## 2021-05-30 PROCEDURE — 36415 COLL VENOUS BLD VENIPUNCTURE: CPT

## 2021-05-30 PROCEDURE — 74011636637 HC RX REV CODE- 636/637: Performed by: INTERNAL MEDICINE

## 2021-05-30 PROCEDURE — C9113 INJ PANTOPRAZOLE SODIUM, VIA: HCPCS | Performed by: STUDENT IN AN ORGANIZED HEALTH CARE EDUCATION/TRAINING PROGRAM

## 2021-05-30 RX ORDER — POTASSIUM CHLORIDE 750 MG/1
40 TABLET, FILM COATED, EXTENDED RELEASE ORAL ONCE
Status: COMPLETED | OUTPATIENT
Start: 2021-05-30 | End: 2021-05-30

## 2021-05-30 RX ADMIN — METOPROLOL TARTRATE 50 MG: 50 TABLET, FILM COATED ORAL at 09:49

## 2021-05-30 RX ADMIN — INSULIN GLARGINE 67 UNITS: 100 INJECTION, SOLUTION SUBCUTANEOUS at 09:50

## 2021-05-30 RX ADMIN — AMLODIPINE BESYLATE 10 MG: 5 TABLET ORAL at 09:49

## 2021-05-30 RX ADMIN — Medication 10 ML: at 06:59

## 2021-05-30 RX ADMIN — INSULIN LISPRO 4 UNITS: 100 INJECTION, SOLUTION INTRAVENOUS; SUBCUTANEOUS at 09:50

## 2021-05-30 RX ADMIN — Medication 1000 UNITS: at 09:49

## 2021-05-30 RX ADMIN — RIFAXIMIN 550 MG: 550 TABLET ORAL at 09:49

## 2021-05-30 RX ADMIN — HYDROCHLOROTHIAZIDE 12.5 MG: 25 TABLET ORAL at 09:49

## 2021-05-30 RX ADMIN — Medication 100 MG: at 09:49

## 2021-05-30 RX ADMIN — INSULIN LISPRO 3 UNITS: 100 INJECTION, SOLUTION INTRAVENOUS; SUBCUTANEOUS at 11:57

## 2021-05-30 RX ADMIN — LACTULOSE 30 ML: 20 SOLUTION ORAL at 09:49

## 2021-05-30 RX ADMIN — INSULIN LISPRO 4 UNITS: 100 INJECTION, SOLUTION INTRAVENOUS; SUBCUTANEOUS at 11:57

## 2021-05-30 RX ADMIN — SODIUM CHLORIDE 40 MG: 9 INJECTION, SOLUTION INTRAMUSCULAR; INTRAVENOUS; SUBCUTANEOUS at 09:49

## 2021-05-30 RX ADMIN — POTASSIUM CHLORIDE 40 MEQ: 750 TABLET, EXTENDED RELEASE ORAL at 09:49

## 2021-05-30 NOTE — PROGRESS NOTES
Transition of Care Plan:    RUR: 15  Disposition: [address] of [place]:   RN to call report: [phone]    Salvador called with [facility] Authorization. Auth valid for 5 days [facility] 5/29/21 to 6/2/21  ID Number; [id number]  Call Updated Clinicals to Peggy Ringer: [phone]  Fax: [phone]    CM talked to [facility] in Admissions: Able to accept today. Room:   Pt has had Pfiser Vaccination. Wife has not had [place] Vaccination. Wife will call [address] [place] [address] in [address] when she arrival    Pt is using a Bariatric Bed here. [facility],[address] is going to order one but he is aware and he is ok with regular size bed until one can be delivered to the facility. Called MD and he will place DC order. Follow up appointments:PCP and Specialist.  Weekend CM unable to schedule. DME needed: SNF ordering Bariatric Bed. Transportation at Discharge: Wife, Mirian Son: [phone] and she will be here around 12:30 PM.    Keys or means to access home:   yes      IM Medicare letter: delivered yesterday, 5/29/21. Medicare pt has received, reviewed, and signed 2nd IM letter informing them of their right to appeal the discharge. Signed copy has been placed on pt bedside chart. Caregiver Contact: Mirian Son: [phone]  Discharge Caregiver contacted prior to discharge? CM called and talked to wife in Pt room     NO further needs. Care Management Interventions  PCP Verified by CM:  Yes  Palliative Care Criteria Met (RRAT>21 & CHF Dx)?: No  Mode of Transport at Discharge: [facility] Transport Time of Discharge: [address] (CM Consult): SNF  Partner SNF: Yes  MyChart Signup: No  Discharge Durable Medical Equipment: No  Physical Therapy Consult: Yes  Occupational Therapy Consult: Yes  Current Support Network: Lives with Spouse, Own Home, Family Lives Nearby  Confirm Follow Up Transport: Family  The Plan for Transition of Care is Related to the Following Treatment Goals : SNF: [address] [place] [address],[address]  The Patient and/or Patient Representative was Provided with a Choice of Provider and Agrees with the Discharge Plan?: Yes  Name of the Patient Representative Who was Provided with a Choice of Provider and Agrees with the Discharge Plan: Pt and Wife  Freedom of Choice List was Provided with Basic Dialogue that Supports the Patient's Individualized Plan of Care/Goals, Treatment Preferences and Shares the Quality Data Associated with the Providers?: Yes  Discharge Location  Discharge Placement: Skilled nursing [place], [place]  Ext [phone]

## 2021-05-30 NOTE — PROGRESS NOTES
Hospital to [facility] [address]                                                                        57 y.o.   male    [address] [facility]   Room: 2112/01    [facility] [place] 2 GENERAL SURGERY  Unit Phone# :  [phone]      [address]  [facility] [place] [address] [address]  Dept: [phone]  Loc: [phone]                    SITUATION     Admitted:  5/23/2021         Attending Provider:  Jason Brambila MD       Consultations:  IP CONSULT TO GASTROENTEROLOGY    PCP:  Lissa Robles MD   [phone]    Treatment Team: Attending Provider: Jason Brambila MD; Consulting Provider: Bren Heard MD; Utilization Review: Shayla Mckeon, LATOYA; Consulting Provider: Hu Lees NP; Primary Nurse: Travis Torres; Care Manager: Yung Tejeda, RN; Care Manager: Kevin Todd; Care Manager: Zach Pineda, RN; Charge Nurse: Rachel Beyer RN    Admitting Dx:  Acute GI bleeding [K92.2]  Acute blood loss anemia [D62]       Principal Problem: <principal problem not specified>    6 Days Post-Op of   Procedure(s):  ESOPHAGOGASTRODUODENOSCOPY (EGD)  ENDOSCOPIC BANDING OR LIGATION   BY: Bren Heard MD             ON: 5/24/2021                  Code Status: Full Code                Advance Directives:   Advance Care Planning 5/29/2021   Confirm Advance Directive None    (Send w/patient)   Yes Not W Pt       Isolation:  There are currently no Active Isolations       MDRO: No current active infections    Pain Medications given:  none       Special Equipment needed: no  Type of equipment:           BACKGROUND     Allergies: Allergies   Allergen Reactions    Lisinopril Cough       Past Medical History:   Diagnosis Date    Diabetes ([facility] [address]     Neurological disorder        History reviewed. No pertinent surgical history.     Medications Prior to Admission   Medication Sig    glucose blood VI test strips (ACCU-CHEK SMARTVIEW TEST STRIP) strip TEST 2 TO 3 TIMES DAILY    [DISCONTINUED] gabapentin (NEURONTIN) 300 mg capsule Take 1 Cap by mouth three (3) times daily. Indications: NEUROPATHIC PAIN, POSTHERPETIC NEURALGIA    amLODIPine (NORVASC) 10 mg tablet Take 1 Tab by mouth daily.  clopidogrel (PLAVIX) 75 mg tab Take 1 Tab by mouth daily.  [DISCONTINUED] losartan (COZAAR) 100 mg tablet TAKE ONE TABLET BY MOUTH ONCE DAILY    BD INSULIN SYRINGE 1 mL 28 gauge x 1/2\" syrg Use two times per day with insulin  DX: E11.42 (patient wanted longer needle)    insulin syringe-needle U-100 1 mL 31 gauge x 15/64\" syrg 1 Syringe by Does Not Apply route two (2) times a day. Use to inject insulin two times per day. DX: E11.42    [DISCONTINUED] insulin NPH/insulin regular (NOVOLIN 70/30, HUMULIN 70/30) 100 unit/mL (70-30) injection Inject  units in AM and PM with meals. Dispense novolin or humulin brand or relion brand whichever is cheaper    atorvastatin (LIPITOR) 10 mg tablet Take 1 Tab by mouth nightly.  metFORMIN (GLUCOPHAGE) 1,000 mg tablet Take 1 Tab by mouth two (2) times daily (with meals). Indications: TYPE 2 DIABETES MELLITUS    aspirin delayed-release 81 mg tablet Take 1 Tab by mouth daily.  Multivit,Ca,Iron-FA-Lyco-Lut (CENTRAL-DUY) -925-250 mg-mcg-mcg-mcg tab Take  by mouth.  Cholecalciferol, Vitamin D3, (VITAMIN D3) 1,000 unit cap Take  by mouth.  Hydrochlorothiazide 12.5 mg tablet Take 12.5 mg by mouth daily.  metoprolol (LOPRESSOR) 50 mg tablet Take 50 mg by mouth two (2) times a day. Hard scripts included in transfer packet no    Vaccinations:    Immunization History   Administered Date(s) Administered    COVID-19, PFIZER, MRNA, LNP-S, PF, 30MCG/0.3ML DOSE 04/07/2021, 04/30/2021             The Charlson CoMorbitiy Index tool is an evidenced based tool that has more automatic generated information.  The tool looks at many different items such as the age of the patient, how many times they were admitted in the last calendar year, current length of stay in the hospital and their diagnosis. All of these items are pulled automatically from information documented in the chart from various places and will generate a score that predicts whether a patient is at low (less than 13), medium (13-20) or high (21 or greater) risk of being readmitted.         ASSESSMENT                Temp: 98.7 °F (37.1 °C) (05/30/21 1058) Pulse (Heart Rate): 73 (05/30/21 1058)     Resp Rate: 18 (05/30/21 1058)           BP: (!) 140/78 (05/30/21 1058)     O2 Sat (%): 98 % (05/30/21 1058)     Weight: 156 kg (343 lb 14.7 oz)    Height: 6' 2\" (188 cm) (05/23/21 1924)       If above not within 1 hour of discharge:    BP:_____  P:____  R:____ T:_____ O2 Sat: ___%  O2: ______    Active Orders   Diet    DIET GI LITE (POST SURGICAL)         Orientation: oriented to time, place, person and situation     Active Behaviors: None                                   Active Lines/Drains:  (Peg Tube / Lockett / CL or S/L?): no    Urinary Status: Voiding     Last BM: Last Bowel Movement Date: 05/28/21     Skin Integrity: Intact             Mobility: Slightly limited   Weight Bearing Status: WBAT (Weight Bearing as Tolerated)      Gait Training  Assistive Device: Gait belt, Cane, straight  Ambulation - Level of Assistance: Contact guard assistance, Assist x1, Additional time  Distance (ft): 20 Feet (ft)         Lab Results   Component Value Date/Time    Glucose 126 (H) 05/30/2021 04:05 AM    Hemoglobin A1c 10.8 (H) 08/27/2015 04:14 PM    INR 1.2 (H) 05/28/2021 03:48 AM    INR 1.2 (H) 05/27/2021 03:14 AM    HGB 7.8 (L) 05/29/2021 01:01 AM    HGB 7.9 (L) 05/28/2021 03:48 AM    Hemoglobin A1c, External 6.6 07/18/2018 12:00 AM        RECOMMENDATION     See After Visit Summary (AVS) for:  · Discharge instructions  · After [address]   · Special equipment needed (entered pre-discharge by Care Management)  · Medication Reconciliation    · Follow up Appointment(s)         Report given/sent by:  Yolanda Burris RN                    Verbal report given to: Dakota Snider LPN         Estimated discharge time:  5/30/2021 at 1300. Updated discharge time: 1430. Copy of H&P, MAR, & AVS prepared for discharge paperwork for facility and will be sent with pt.

## 2021-05-30 NOTE — PROGRESS NOTES
End of Shift Note    Bedside shift change report given to Jeremías Gross RN (oncoming nurse) by Hung Joy RN (offgoing nurse). Report included the following information SBAR, Kardex, Intake/Output, MAR and Recent Results    Shift worked:  7p-7a     Shift summary and any significant changes:     Pt spent the night in the recliner. Pt rested well. No complaints. Concerns for physician to address:  none     Zone phone for oncoming shift:          Activity:  Activity Level: Up with Assistance  Number times ambulated in hallways past shift: 0  Number of times OOB to chair past shift: 3    Cardiac:   Cardiac Monitoring: No      Cardiac Rhythm: Sinus Rhythm    Access:   Current line(s): PIV     Genitourinary:   Urinary status: voiding    Respiratory:   O2 Device: None (Room air)  Chronic home O2 use?: NO  Incentive spirometer at bedside: NO     GI:  Last Bowel Movement Date: 05/28/21  Current diet:  DIET GI LITE (POST SURGICAL)  Passing flatus: YES  Tolerating current diet: YES       Pain Management:   Patient states pain is manageable on current regimen: YES    Skin:  Power Score: 18  Interventions: float heels and increase time out of bed    Patient Safety:  Fall Score:  Total Score: 3  Interventions: bed/chair alarm, assistive device (walker, cane, etc), gripper socks, pt to call before getting OOB and stay with me (per policy)  High Fall Risk: Yes    Length of Stay:  Expected LOS: 3d 2h  Actual LOS: 7      Raj Farris RN

## 2022-03-18 PROBLEM — D62 ACUTE BLOOD LOSS ANEMIA: Status: ACTIVE | Noted: 2021-05-23

## 2022-03-19 PROBLEM — E66.01 OBESITY, MORBID (HCC): Status: ACTIVE | Noted: 2018-04-19

## 2022-03-20 PROBLEM — K92.2 ACUTE GI BLEEDING: Status: ACTIVE | Noted: 2021-05-23

## 2022-04-18 NOTE — TELEPHONE ENCOUNTER
Letter was faxed to patient's wife. Patient Education     Laryngitis    Laryngitis is a swelling of the tissues around the vocal cords. Symptoms include a hoarse (scratchy) voice. Or your voice may be gone for a few days or longer. This may be caused by a viral illness, such as a head or chest cold. It may also be due to overuse and strain of your voice. Smoking, drinking alcohol, acid reflux, allergies, or inhaling harsh chemicals may also lead to symptoms. This condition will usually go away in 1 to 2 weeks.   Home care  · Rest your voice until it recovers. Talk as little as possible. If your symptoms are severe, rest at home for a day or so.  · Moist air may help your symptoms. Try breathing cool steam from a humidifier or vaporizer. Or breathe air from a steamy shower.  · Drink plenty of fluids to stay well hydrated.  · Don't smoke    Follow-up care  Follow up with your healthcare provider or this facility if you are not better after 1 week. If your hoarse voice lasts more than 2 weeks, you may need to see an otolaryngologist. This is a doctor who treats diseases and disorders of the ear, nose, and throat (ENT). Seeing this doctor is especially important if you have a history of alcohol or tobacco use.   When to seek medical advice  Contact your healthcare provider right away if you have any of the following:   · Symptoms that get worse  · Severe pain with swallowing  · Trouble opening your mouth  · Neck swelling, neck pain, or trouble moving your neck  · Fever of 100.4°F (38.ºC) or higher, or as directed by your healthcare provider  · Symptoms do not go away in 2 weeks  Call 911  Call 911 or seek immediate medical care if you have any of the following:   · Noisy breathing or trouble breathing  · Drooling or not able to swallow  · Not able to talk  · Feeling dizzy or lightheaded  [place] last reviewed this educational content on 4/1/2020 © 2000-2021 [employer]. All rights reserved. This information is not intended as a substitute  for professional medical care. Always follow your healthcare professional's instructions.           Patient Education     Back Sprain or Strain     Injury to the muscles (strain) or ligaments (sprain) around the spine can be troubling. Injury may occur after a sudden forceful twisting or bending such as in a car accident, after a simple awkward movement, or after lifting something heavy with poor body positioning. In any case, muscle spasm is often present and adds to the pain.  Thankfully, most people feel better in 1 to 2 weeks. Most of the rest feel better in 1 to 2 months. Most people can remain active. Unless you had a forceful or traumatic physical injury such as a car accident or fall, X-rays may not be done for the first assessment of a back sprain or strain. If pain continues and doesn't respond to medical treatment, your healthcare provider may then do X-rays and other tests.  Home care  These guidelines will help you care for your injury at home:  · When in bed, try to find a comfortable position. A firm mattress is best. Try lying flat on your back with pillows under your knees. You can also try lying on your side with your knees bent up toward your chest and a pillow between your knees.  · Don't sit for long periods. Try not to take long car rides or take other trips that have you sitting for a long time. This puts more stress on the lower back than standing or walking.  · During the first 24 to 72 hours after an injury or flare-up, put an ice pack on the painful area for 20 minutes. Then remove it for 20 minutes. Do this for 60 to 90 minutes, or several times a day. This will reduce swelling and pain. Always wrap the ice pack in a thin towel or plastic to protect your skin.  · You can start with ice, then switch to heat. Heat from a hot shower, hot bath, or heating pad reduces pain and works well for muscle spasms. Put heat on the painful area for 20 minutes, then remove for 20 minutes. Do this for  60 to 90 minutes, or several times a day. Don't use a heating pad while sleeping. It can burn the skin.  · You can alternate the ice and heat. Talk with your healthcare provider to find out the best treatment or therapy for your back pain.  · Therapeutic massage can help relax the back muscles without stretching them.  · Be aware of safe lifting methods. Don't lift anything over 15 pounds until all of the pain is gone.  Medicines  Talk with your healthcare provider before using medicines, especially if you have other health problems or are taking other medicines.  · You may use over-the-counter medicines such as acetaminophen, ibuprofen, or naproxen to control pain, unless another pain medicine was prescribed. Talk with your healthcare provider before taking any medicines if you have a chronic condition such as diabetes, liver or kidney disease, stomach ulcers, or digestive bleeding, or are taking blood-thinner medicines.  · Be careful if you are given prescription medicines, opioids, or medicine for muscle spasm. They can cause drowsiness, and affect your coordination, reflexes, and judgment. Don't drive or operate heavy machinery when taking these types of medicines. Only take pain medicine as prescribed by your healthcare provider.  Follow-up care  Follow up with your healthcare provider, or as advised. You may need physical therapy or more tests if your symptoms get worse.  If you had X-rays, your healthcare provider may be checking for any broken bones, breaks, or fractures. Bruises and sprains can sometimes hurt as much as a fracture. These injuries can take time to heal fully. If your symptoms don’t get better or they get worse, talk with your healthcare provider. You may need a repeat X-ray or other tests.  Call 911  Call 911 if any of the following occur:  · Trouble breathing  · Confused  · Very drowsy or trouble awakening  · Fainting or loss of consciousness  · Rapid or very slow heart rate  · Loss of  bowel or bladder control  When to seek medical advice  Call your healthcare provider right away if any of the following occur:  · Pain gets worse or spreads to your arms or legs  · Weakness or numbness in one or both arms or legs  · Numbness in the groin or genital area  [place] last reviewed this educational content on 11/1/2019  © 9201-9137 [employer]. All rights reserved. This information is not intended as a substitute for professional medical care. Always follow your healthcare professional's instructions.

## 2022-11-03 NOTE — PROGRESS NOTES
TRANSFER - IN REPORT:    Verbal report received from Fletcher Lopez (name) on Arina Weiss  being received from PCU (unit) for routine progression of care      Report consisted of patients Situation, Background, Assessment and   Recommendations(SBAR). Information from the following report(s) SBAR, Kardex, Intake/Output, MAR and Recent Results was reviewed with the receiving nurse. Opportunity for questions and clarification was provided. Assessment completed upon patients arrival to unit and care assumed. End of Shift Note    Bedside shift change report given to Sunny Negrete (oncoming nurse) by Shannan Mehta RN (offgoing nurse). Report included the following information SBAR, Kardex, Intake/Output, MAR and Recent Results    Shift worked:  7a-7p     Shift summary and any significant changes:     pt transferred to floor around 1820. Got pt settled, attempted a condom cath, ended up putting on a male purewick. Dual skin assessment shows skin intact     Concerns for physician to address:  none     Zone phone for oncoming shift:   [phone]       Activity:  Activity Level: Bed Rest  Number times ambulated in hallways past shift: 0  Number of times OOB to chair past shift: 0    Cardiac:   Cardiac Monitoring: No      Cardiac Rhythm: Sinus Rhythm    Access:   Current line(s): PIV     Genitourinary:   Urinary status: incontinent and external catheter    Respiratory:   O2 Device: None (Room air)  Chronic home O2 use?: NO  Incentive spirometer at bedside: NO     GI:  Last Bowel Movement Date: 05/27/21  Current diet:  DIET GI LITE (POST SURGICAL)  Passing flatus: YES  Tolerating current diet: YES       Pain Management:   Patient states pain is manageable on current regimen: YES    Skin:  Power Score: 13  Interventions: turn team, speciality bed and internal/external urinary devices    Patient Safety:  Fall Score:  Total Score: 3  Interventions: pt to call before getting OOB and stay with me (per policy)  High Fall Risk: Yes    Length of Stay:  Expected LOS: 3d 2h  Actual LOS: 4      Jassi Rosas RN Clindamycin Pregnancy And Lactation Text: This medication can be used in pregnancy if certain situations. Clindamycin is also present in breast milk.

## 2023-05-20 RX ORDER — AMLODIPINE BESYLATE 10 MG/1
10 TABLET ORAL DAILY
COMMUNITY
Start: 2018-02-21

## 2023-05-20 RX ORDER — ATORVASTATIN CALCIUM 10 MG/1
10 TABLET, FILM COATED ORAL
COMMUNITY
Start: 2016-03-28

## 2023-05-20 RX ORDER — LANOLIN ALCOHOL/MO/W.PET/CERES
100 CREAM (GRAM) TOPICAL DAILY
COMMUNITY
Start: 2021-05-30

## 2023-05-20 RX ORDER — ASPIRIN 81 MG/1
81 TABLET ORAL DAILY
COMMUNITY
Start: 2015-05-20

## 2024-04-22 RX ORDER — FUROSEMIDE 20 MG/1
20 TABLET ORAL DAILY
COMMUNITY
Start: 2021-12-29

## 2024-04-22 RX ORDER — PANTOPRAZOLE SODIUM 40 MG/1
40 TABLET, DELAYED RELEASE ORAL DAILY PRN
COMMUNITY

## 2024-04-22 RX ORDER — CLOPIDOGREL BISULFATE 75 MG/1
1 TABLET ORAL DAILY
COMMUNITY
Start: 2021-10-18

## 2024-04-22 RX ORDER — POTASSIUM CHLORIDE 750 MG/1
1 TABLET, FILM COATED, EXTENDED RELEASE ORAL 2 TIMES DAILY
COMMUNITY
Start: 2023-07-17

## 2024-04-22 RX ORDER — CARVEDILOL 6.25 MG/1
1 TABLET ORAL 2 TIMES DAILY WITH MEALS
COMMUNITY
Start: 2021-06-18

## 2024-04-22 RX ORDER — FERROUS SULFATE 325(65) MG
325 TABLET ORAL
COMMUNITY

## 2024-04-22 RX ORDER — GABAPENTIN 100 MG/1
100 CAPSULE ORAL 3 TIMES DAILY
COMMUNITY
Start: 2023-08-22

## 2024-04-22 RX ORDER — LACTULOSE 10 G/15ML
10 SOLUTION ORAL 2 TIMES DAILY
COMMUNITY
Start: 2023-12-13

## 2024-04-22 RX ORDER — LOSARTAN POTASSIUM 100 MG/1
100 TABLET ORAL DAILY
COMMUNITY
Start: 2022-02-22

## 2024-04-22 RX ORDER — CHLORTHALIDONE 25 MG/1
1 TABLET ORAL DAILY
COMMUNITY
Start: 2021-04-30

## 2024-04-22 RX ORDER — FLUTICASONE PROPIONATE 50 MCG
2 SPRAY, SUSPENSION (ML) NASAL AS NEEDED
COMMUNITY
Start: 2024-04-08

## 2024-04-22 NOTE — PERIOP NOTE
[facility]  Ambulatory Surgery Unit  Pre-operative Instructions    Surgery/Procedure Date  Tuesday, April 30, 2024            Tentative Arrival Time TBD      1. On the day of your surgery/procedure, please report to the Ambulatory Surgery Unit Registration Desk and sign in at your designated time. The Ambulatory Surgery Unit is located in [facility] on the [facility] the [facility] across from the [facility]. Please have all of your health insurance cards, co-payment, and a photo ID.    **TWO adults may accompany you the day of the procedure.  We have limited seating available.      2. You cannot be dropped off for surgery.  Please make arrangements for a responsible adult friend or family member to remain on the hospital campus during your procedure, and drive you home, as you should not drive for 24 hours following anesthesia. Make arrangements for a responsible adult to stay with you for at least the first 24 hours after your surgery.    3. Do not have anything to eat or drink (including water, gum, mints, coffee, juice) after 11:59 PM, Monday. This may not apply to medications prescribed by your physician.  (Please note below the special instructions with medications to take the morning of surgery, if applicable.)    4. We recommend you do not drink any alcoholic beverages for 24 hours before and after your surgery.    5. Contact your surgeon’s office for instructions on the following medications: non-steroidal anti-inflammatory drugs (i.e. Advil, Aleve), vitamins, and supplements. (Some surgeon’s will want you to stop these medications prior to surgery and others may allow you to take them)   **If you are currently taking Plavix, Coumadin, Aspirin and/or other blood-thinning agents, contact your surgeon for instructions.** Your surgeon will partner with the physician prescribing these medications to determine if it is safe to stop or if you need to continue taking.

## 2024-04-29 ENCOUNTER — ANESTHESIA EVENT (OUTPATIENT)
Facility: HOSPITAL | Age: 64
End: 2024-04-29
Payer: MEDICARE

## 2024-04-30 ENCOUNTER — ANESTHESIA (OUTPATIENT)
Facility: HOSPITAL | Age: 64
End: 2024-04-30
Payer: MEDICARE

## 2024-04-30 ENCOUNTER — HOSPITAL ENCOUNTER (OUTPATIENT)
Facility: HOSPITAL | Age: 64
Setting detail: OUTPATIENT SURGERY
Discharge: HOME | End: 2024-04-30
Attending: OPHTHALMOLOGY | Admitting: OPHTHALMOLOGY
Payer: MEDICARE

## 2024-04-30 VITALS
HEIGHT: 74 IN | WEIGHT: 315 LBS | RESPIRATION RATE: 14 BRPM | DIASTOLIC BLOOD PRESSURE: 78 MMHG | BODY MASS INDEX: 40.43 KG/M2 | HEART RATE: 77 BPM | OXYGEN SATURATION: 97 % | TEMPERATURE: 98 F | SYSTOLIC BLOOD PRESSURE: 146 MMHG

## 2024-04-30 LAB
GLUCOSE BLD STRIP.AUTO-MCNC: 148 MG/DL (ref 65–117)
SERVICE CMNT-IMP: ABNORMAL

## 2024-04-30 PROCEDURE — 6370000000 HC RX 637 (ALT 250 FOR IP)

## 2024-04-30 PROCEDURE — 6360000002 HC RX W HCPCS: Performed by: OPHTHALMOLOGY

## 2024-04-30 PROCEDURE — 3600000013 HC SURGERY LEVEL 3 ADDTL 15MIN: Performed by: OPHTHALMOLOGY

## 2024-04-30 PROCEDURE — 2580000003 HC RX 258: Performed by: ANESTHESIOLOGY

## 2024-04-30 PROCEDURE — 2709999900 HC NON-CHARGEABLE SUPPLY: Performed by: OPHTHALMOLOGY

## 2024-04-30 PROCEDURE — 2500000003 HC RX 250 WO HCPCS: Performed by: NURSE ANESTHETIST, CERTIFIED REGISTERED

## 2024-04-30 PROCEDURE — 3700000001 HC ADD 15 MINUTES (ANESTHESIA): Performed by: OPHTHALMOLOGY

## 2024-04-30 PROCEDURE — 6360000002 HC RX W HCPCS: Performed by: NURSE ANESTHETIST, CERTIFIED REGISTERED

## 2024-04-30 PROCEDURE — 2500000003 HC RX 250 WO HCPCS

## 2024-04-30 PROCEDURE — 3600000003 HC SURGERY LEVEL 3 BASE: Performed by: OPHTHALMOLOGY

## 2024-04-30 PROCEDURE — 3700000000 HC ANESTHESIA ATTENDED CARE: Performed by: OPHTHALMOLOGY

## 2024-04-30 PROCEDURE — 2500000003 HC RX 250 WO HCPCS: Performed by: OPHTHALMOLOGY

## 2024-04-30 PROCEDURE — V2632 POST CHMBR INTRAOCULAR LENS: HCPCS | Performed by: OPHTHALMOLOGY

## 2024-04-30 PROCEDURE — 7100000011 HC PHASE II RECOVERY - ADDTL 15 MIN: Performed by: OPHTHALMOLOGY

## 2024-04-30 PROCEDURE — 7100000010 HC PHASE II RECOVERY - FIRST 15 MIN: Performed by: OPHTHALMOLOGY

## 2024-04-30 PROCEDURE — 82962 GLUCOSE BLOOD TEST: CPT

## 2024-04-30 PROCEDURE — 7100000000 HC PACU RECOVERY - FIRST 15 MIN: Performed by: OPHTHALMOLOGY

## 2024-04-30 PROCEDURE — 6370000000 HC RX 637 (ALT 250 FOR IP): Performed by: OPHTHALMOLOGY

## 2024-04-30 RX ORDER — SODIUM CHLORIDE, POTASSIUM CHLORIDE, CALCIUM CHLORIDE, MAGNESIUM CHLORIDE, SODIUM ACETATE, AND SODIUM CITRATE 6.4; .75; .48; .3; 3.9; 1.7 MG/ML; MG/ML; MG/ML; MG/ML; MG/ML; MG/ML
15 SOLUTION IRRIGATION ONCE
Status: COMPLETED | OUTPATIENT
Start: 2024-04-30 | End: 2024-04-30

## 2024-04-30 RX ORDER — SODIUM CHLORIDE 9 MG/ML
INJECTION, SOLUTION INTRAVENOUS PRN
Status: DISCONTINUED | OUTPATIENT
Start: 2024-04-30 | End: 2024-04-30 | Stop reason: HOSPADM

## 2024-04-30 RX ORDER — DICLOFENAC SODIUM 1 MG/ML
SOLUTION/ DROPS OPHTHALMIC
Status: COMPLETED
Start: 2024-04-30 | End: 2024-04-30

## 2024-04-30 RX ORDER — PROPARACAINE HYDROCHLORIDE 5 MG/ML
SOLUTION/ DROPS OPHTHALMIC
Status: COMPLETED
Start: 2024-04-30 | End: 2024-04-30

## 2024-04-30 RX ORDER — LIDOCAINE HYDROCHLORIDE 20 MG/ML
INJECTION, SOLUTION EPIDURAL; INFILTRATION; INTRACAUDAL; PERINEURAL PRN
Status: DISCONTINUED | OUTPATIENT
Start: 2024-04-30 | End: 2024-04-30 | Stop reason: SDUPTHER

## 2024-04-30 RX ORDER — PROPARACAINE HYDROCHLORIDE 5 MG/ML
1 SOLUTION/ DROPS OPHTHALMIC SEE ADMIN INSTRUCTIONS
Status: COMPLETED | OUTPATIENT
Start: 2024-04-30 | End: 2024-04-30

## 2024-04-30 RX ORDER — NALOXONE HYDROCHLORIDE 0.4 MG/ML
INJECTION, SOLUTION INTRAMUSCULAR; INTRAVENOUS; SUBCUTANEOUS PRN
Status: DISCONTINUED | OUTPATIENT
Start: 2024-04-30 | End: 2024-04-30 | Stop reason: HOSPADM

## 2024-04-30 RX ORDER — CYCLOPENTOLATE HYDROCHLORIDE 10 MG/ML
SOLUTION/ DROPS OPHTHALMIC
Status: COMPLETED
Start: 2024-04-30 | End: 2024-04-30

## 2024-04-30 RX ORDER — TETRACAINE HYDROCHLORIDE 5 MG/ML
1 SOLUTION OPHTHALMIC ONCE
Status: DISCONTINUED | OUTPATIENT
Start: 2024-04-30 | End: 2024-04-30 | Stop reason: HOSPADM

## 2024-04-30 RX ORDER — PHENYLEPHRINE HYDROCHLORIDE 25 MG/ML
1 SOLUTION/ DROPS OPHTHALMIC SEE ADMIN INSTRUCTIONS
Status: COMPLETED | OUTPATIENT
Start: 2024-04-30 | End: 2024-04-30

## 2024-04-30 RX ORDER — TROPICAMIDE 10 MG/ML
1 SOLUTION/ DROPS OPHTHALMIC SEE ADMIN INSTRUCTIONS
Status: COMPLETED | OUTPATIENT
Start: 2024-04-30 | End: 2024-04-30

## 2024-04-30 RX ORDER — ACETAMINOPHEN 500 MG
1000 TABLET ORAL
Status: DISCONTINUED | OUTPATIENT
Start: 2024-04-30 | End: 2024-04-30 | Stop reason: HOSPADM

## 2024-04-30 RX ORDER — TROPICAMIDE 10 MG/ML
SOLUTION/ DROPS OPHTHALMIC
Status: COMPLETED
Start: 2024-04-30 | End: 2024-04-30

## 2024-04-30 RX ORDER — ONDANSETRON 2 MG/ML
4 INJECTION INTRAMUSCULAR; INTRAVENOUS
Status: DISCONTINUED | OUTPATIENT
Start: 2024-04-30 | End: 2024-04-30 | Stop reason: HOSPADM

## 2024-04-30 RX ORDER — OXYCODONE HYDROCHLORIDE 5 MG/1
10 TABLET ORAL PRN
Status: DISCONTINUED | OUTPATIENT
Start: 2024-04-30 | End: 2024-04-30 | Stop reason: HOSPADM

## 2024-04-30 RX ORDER — SODIUM CHLORIDE 0.9 % (FLUSH) 0.9 %
5-40 SYRINGE (ML) INJECTION EVERY 12 HOURS SCHEDULED
Status: DISCONTINUED | OUTPATIENT
Start: 2024-04-30 | End: 2024-04-30 | Stop reason: HOSPADM

## 2024-04-30 RX ORDER — SODIUM CHLORIDE 0.9 % (FLUSH) 0.9 %
5-40 SYRINGE (ML) INJECTION PRN
Status: DISCONTINUED | OUTPATIENT
Start: 2024-04-30 | End: 2024-04-30 | Stop reason: HOSPADM

## 2024-04-30 RX ORDER — CYCLOPENTOLATE HYDROCHLORIDE 10 MG/ML
1 SOLUTION/ DROPS OPHTHALMIC SEE ADMIN INSTRUCTIONS
Status: COMPLETED | OUTPATIENT
Start: 2024-04-30 | End: 2024-04-30

## 2024-04-30 RX ORDER — MEPERIDINE HYDROCHLORIDE 25 MG/ML
12.5 INJECTION INTRAMUSCULAR; INTRAVENOUS; SUBCUTANEOUS EVERY 5 MIN PRN
Status: DISCONTINUED | OUTPATIENT
Start: 2024-04-30 | End: 2024-04-30 | Stop reason: HOSPADM

## 2024-04-30 RX ORDER — SODIUM CHLORIDE, SODIUM LACTATE, POTASSIUM CHLORIDE, CALCIUM CHLORIDE 600; 310; 30; 20 MG/100ML; MG/100ML; MG/100ML; MG/100ML
INJECTION, SOLUTION INTRAVENOUS CONTINUOUS
Status: DISCONTINUED | OUTPATIENT
Start: 2024-04-30 | End: 2024-04-30 | Stop reason: HOSPADM

## 2024-04-30 RX ORDER — LIDOCAINE HYDROCHLORIDE 10 MG/ML
1 INJECTION, SOLUTION EPIDURAL; INFILTRATION; INTRACAUDAL; PERINEURAL
Status: DISCONTINUED | OUTPATIENT
Start: 2024-04-30 | End: 2024-04-30 | Stop reason: HOSPADM

## 2024-04-30 RX ORDER — MIDAZOLAM HYDROCHLORIDE 5 MG/5ML
2 INJECTION, SOLUTION INTRAMUSCULAR; INTRAVENOUS
Status: DISCONTINUED | OUTPATIENT
Start: 2024-04-30 | End: 2024-04-30 | Stop reason: HOSPADM

## 2024-04-30 RX ORDER — OXYCODONE HYDROCHLORIDE 5 MG/1
5 TABLET ORAL PRN
Status: DISCONTINUED | OUTPATIENT
Start: 2024-04-30 | End: 2024-04-30 | Stop reason: HOSPADM

## 2024-04-30 RX ORDER — DROPERIDOL 2.5 MG/ML
0.62 INJECTION, SOLUTION INTRAMUSCULAR; INTRAVENOUS
Status: DISCONTINUED | OUTPATIENT
Start: 2024-04-30 | End: 2024-04-30 | Stop reason: HOSPADM

## 2024-04-30 RX ORDER — NEOMYCIN SULFATE, POLYMYXIN B SULFATE, AND DEXAMETHASONE 3.5; 10000; 1 MG/G; [USP'U]/G; MG/G
OINTMENT OPHTHALMIC ONCE
Status: COMPLETED | OUTPATIENT
Start: 2024-04-30 | End: 2024-04-30

## 2024-04-30 RX ORDER — PHENYLEPHRINE HYDROCHLORIDE 25 MG/ML
SOLUTION/ DROPS OPHTHALMIC
Status: COMPLETED
Start: 2024-04-30 | End: 2024-04-30

## 2024-04-30 RX ORDER — DICLOFENAC SODIUM 1 MG/ML
1 SOLUTION/ DROPS OPHTHALMIC SEE ADMIN INSTRUCTIONS
Status: COMPLETED | OUTPATIENT
Start: 2024-04-30 | End: 2024-04-30

## 2024-04-30 RX ORDER — FENTANYL CITRATE 50 UG/ML
25 INJECTION, SOLUTION INTRAMUSCULAR; INTRAVENOUS EVERY 5 MIN PRN
Status: DISCONTINUED | OUTPATIENT
Start: 2024-04-30 | End: 2024-04-30 | Stop reason: HOSPADM

## 2024-04-30 RX ADMIN — PROPARACAINE HYDROCHLORIDE 1 DROP: 5 SOLUTION/ DROPS OPHTHALMIC at 10:09

## 2024-04-30 RX ADMIN — DICLOFENAC SODIUM 1 DROP: 1 SOLUTION/ DROPS OPHTHALMIC at 10:10

## 2024-04-30 RX ADMIN — PROPARACAINE HYDROCHLORIDE 1 DROP: 5 SOLUTION/ DROPS OPHTHALMIC at 10:24

## 2024-04-30 RX ADMIN — PROPOFOL 60 MG: 10 INJECTION, EMULSION INTRAVENOUS at 11:01

## 2024-04-30 RX ADMIN — SODIUM CHLORIDE: 9 INJECTION, SOLUTION INTRAVENOUS at 10:05

## 2024-04-30 RX ADMIN — TROPICAMIDE 1 DROP: 10 SOLUTION/ DROPS OPHTHALMIC at 10:09

## 2024-04-30 RX ADMIN — DICLOFENAC SODIUM 1 DROP: 1 SOLUTION/ DROPS OPHTHALMIC at 10:16

## 2024-04-30 RX ADMIN — LIDOCAINE HYDROCHLORIDE 20 MG: 20 INJECTION, SOLUTION EPIDURAL; INFILTRATION; INTRACAUDAL; PERINEURAL at 11:01

## 2024-04-30 RX ADMIN — CYCLOPENTOLATE HYDROCHLORIDE 1 DROP: 10 SOLUTION/ DROPS OPHTHALMIC at 10:16

## 2024-04-30 RX ADMIN — TROPICAMIDE 1 DROP: 10 SOLUTION/ DROPS OPHTHALMIC at 10:16

## 2024-04-30 RX ADMIN — PHENYLEPHRINE HYDROCHLORIDE 1 DROP: 25 SOLUTION/ DROPS OPHTHALMIC at 10:10

## 2024-04-30 RX ADMIN — PHENYLEPHRINE HYDROCHLORIDE 1 DROP: 25 SOLUTION/ DROPS OPHTHALMIC at 10:16

## 2024-04-30 RX ADMIN — TROPICAMIDE 1 DROP: 10 SOLUTION/ DROPS OPHTHALMIC at 10:24

## 2024-04-30 RX ADMIN — CYCLOPENTOLATE HYDROCHLORIDE 1 DROP: 10 SOLUTION/ DROPS OPHTHALMIC at 10:09

## 2024-04-30 RX ADMIN — DICLOFENAC SODIUM 1 DROP: 1 SOLUTION/ DROPS OPHTHALMIC at 10:23

## 2024-04-30 RX ADMIN — CYCLOPENTOLATE HYDROCHLORIDE 1 DROP: 10 SOLUTION/ DROPS OPHTHALMIC at 10:24

## 2024-04-30 RX ADMIN — PHENYLEPHRINE HYDROCHLORIDE 1 DROP: 25 SOLUTION/ DROPS OPHTHALMIC at 10:24

## 2024-04-30 ASSESSMENT — ENCOUNTER SYMPTOMS: SHORTNESS OF BREATH: 1

## 2024-04-30 ASSESSMENT — PAIN - FUNCTIONAL ASSESSMENT: PAIN_FUNCTIONAL_ASSESSMENT: 0-10

## 2024-04-30 NOTE — PERIOP NOTE
Bryant Beltran  1960  [id number]    Situation:  Verbal report given from: Bernabe LORENZANA and Brent FONTANEZ  Procedure: Procedure(s):  LEFT EYE PHACOEMULSIFICATION WITH INTRAOCULAR LENS IMPLANT (MAC/RETROBULBAR    Background:    Preoperative diagnosis: Combined forms of age-related cataract of left eye [H25.812]    Postoperative diagnosis: * No post-op diagnosis entered *    :  Dr. Arora    Assistant(s): Circulator: Skylar Chinchilla RN  Scrub Person First: Gilmar Quezada  Scrub Person Second: Remy Holloway  Circulator Assist: Stephanie Colunga RN    Specimens: * No specimens in log *    Assessment:  Intra-procedure medications         Anesthesia gave intra-procedure sedation and medications, see anesthesia flow sheet     Intravenous fluids: LR@ KVO     Vital signs stable      Recommendation:    Permission to share finding with wife

## 2024-04-30 NOTE — PERIOP NOTE
Awake, alert, denies discomfort.  1145 HOB elevated, sipping diet ginger ale. D/C instructions reviewed with wife in conference room  1202  To wheelchair per 2 assists Discharged to home via/wc,accompanied to car per RN. Skin warm and dry, awake and alert. Respirations even, unlabored. Pt and family members questions and concerns addressed prior to discharge. All belongings (cane) with pt.

## 2024-04-30 NOTE — OP NOTE
capsulorrhexis without complication.  Hydrodisection was performed until the nuclear rotated freely in the capsular bag.  The cataract was removed using a two handed divide and conquer technique using a Grisel as a second instrument.  Irrigation/aspiration was used to remove the remaining cortex.  Capsular polish was used as needed.  The bag was inflated using Provisc.  An intraocular lens was placed into the bag without complication.  Irrigation/aspiration was used to remove the viscoelastic.  The chamber was filled with BSS and the wound hydrated.  The wound was found to be watertight.  Subconjunctival injections of ancef and decadron were given.  The lid speculum and drape was removed.  The eye was dressed with ointment, eye pads, and eye shield.  The patient was taken to the recover room having tolerated the procedure well.     Electronically signed by Melisa Arora MD on 4/30/2024 at 2:02 PM

## 2024-04-30 NOTE — ANESTHESIA POSTPROCEDURE EVALUATION
Department of Anesthesiology  Postprocedure Note    Patient: Bryant Beltran  MRN: [id number]  YOB: 1960  Date of evaluation: 4/30/2024    Procedure Summary       Date: 04/30/24 Room / Location: [facility] ASU B3 / [facility] AMBULATORY OR    Anesthesia Start: 1059 Anesthesia Stop: 1131    Procedure: LEFT EYE PHACOEMULSIFICATION WITH INTRAOCULAR LENS IMPLANT (MAC/RETROBULBAR (Left: Eye) Diagnosis:       Combined forms of age-related cataract of left eye      (Combined forms of age-related cataract of left eye [H25.812])    Surgeons: Melisa Arora MD Responsible Provider: Shania Dial MD    Anesthesia Type: MAC ASA Status: 3            Anesthesia Type: MAC    Wenceslao Phase I: Wenceslao Score: 10    Wenceslao Phase II: Wenceslao Score: 10    Anesthesia Post Evaluation    Patient location during evaluation: PACU  Patient participation: complete - patient participated  Level of consciousness: awake and alert  Pain score: 0  Airway patency: patent  Nausea & Vomiting: no nausea and no vomiting  Cardiovascular status: hemodynamically stable and blood pressure returned to baseline  Respiratory status: acceptable  Hydration status: euvolemic  There was medical reason for not using a multimodal analgesia pain management approach.Pain management: satisfactory to patient    No notable events documented.

## 2024-04-30 NOTE — ANESTHESIA PRE PROCEDURE
Department of Anesthesiology  Preprocedure Note       Name:  Bryant Beltran   Age:  64 y.o.  :  1960                                          MRN:  [id number]         Date:  2024      Surgeon: Surgeon(s):  Melisa Arora MD    Procedure: Procedure(s):  LEFT EYE PHACOEMULSIFICATION WITH INTRAOCULAR LENS IMPLANT (MAC/RETROBULBAR    Medications prior to admission:   Prior to Admission medications    Medication Sig Start Date End Date Taking? Authorizing Provider   carvedilol (COREG) 6.25 MG tablet Take 1 tablet by mouth 2 times daily (with meals) 21  Yes Roman Junior MD   chlorthalidone (HYGROTON) 25 MG tablet Take 1 tablet by mouth daily 21  Yes Roman Junior MD   clopidogrel (PLAVIX) 75 MG tablet Take 1 tablet by mouth daily 10/18/21  Yes Roman Junior MD   fluticasone (FLONASE) 50 MCG/ACT nasal spray 2 sprays by Nasal route as needed 24  Yes Roman Junior MD   furosemide (LASIX) 20 MG tablet Take 1 tablet by mouth daily 21  Yes Roman Junior MD   gabapentin (NEURONTIN) 100 MG capsule Take 1 capsule by mouth 3 times daily. 23  Yes Roman Junior MD   blood glucose test strips (ASCENSIA AUTODISC VI;ONE TOUCH ULTRA TEST VI) strip Use to check blood sugar twice daily and as needed.  E11.9 Fill per insurance insurance formulary preference/meter type. 24 Yes Roman Junior MD   lactulose (CHRONULAC) 10 GM/15ML solution Take 15 mLs by mouth 2 times daily 23  Yes Roman Junior MD   losartan (COZAAR) 100 MG tablet Take 1 tablet by mouth daily 22  Yes Roman Junior MD   potassium chloride (KLOR-CON) 10 MEQ extended release tablet Take 1 tablet by mouth 2 times daily 23  Yes Roman Junior MD   ferrous sulfate (IRON 325) 325 (65 Fe) MG tablet Take 1 tablet by mouth daily (with breakfast)    Roman Junior MD   pantoprazole (PROTONIX) 40 MG tablet Take 1 tablet by mouth daily

## 2024-04-30 NOTE — DISCHARGE INSTRUCTIONS
Dr. Melisa Arora [facility]  [facility]  [address] [place] [address] [phone]    Cataract Post Operative Instructions    Diet - you may eat a normal diet but avoid spicy or greasy tonight.    Activity - Limit heavy lifting - do not lift more than 20 pounds for the next 7 days.    Leave your eye patch on tonight. Your eye should remain closed under the patch. Your patch will be removed in Dr. Arora's office tomorrow.     Most people do not have significant pain after cataract surgery. You may take any over-the-counter pain medication that you normally take as needed.     You may resume all of your pre-operative medications.     You should have your postoperative drops. If you do not[facility] You will start eyedrops TOMORROW.     You have an appointment with Dr. Arora tomorrow in her office.    Date: _____5/1/24___________ Time: ___10:15______________    If you need to speak to Dr. Arora after business hours, please call [phone].    DO NOT TAKE SLEEPING MEDICATIONS OR ANTIANXIETY MEDICATIONS WHILE TAKING NARCOTIC PAIN MEDICATIONS,  ESPECIALLY THE NIGHT OF ANESTHESIA.    CPAP PATIENTS BE SURE TO WEAR MACHINE WHENEVER NAPPING OR SLEEPING.    DISCHARGE SUMMARY from Nurse    The following personal items collected during your admission are returned to you:   Dental Appliance:    Vision:    Hearing Aid:    Jewelry:    Clothing:    Other Valuables:    Valuables sent to safe:        PATIENT INSTRUCTIONS:    Anesthesia Discharge Instructions for Procedural Area requiring Sedation (MAC Anesthesia, Cath Lab, Endo and Radiology):   You have been given medications during your procedure that may affect your memory and mental judgement for the next 24 hours. During this time frame for your safety, please follow the instructions listed below :   Have a responsible adult to drive you home and be with you for at least 24 hours.   Rest

## 2024-04-30 NOTE — PERIOP NOTE
Permission received to review discharge instructions and discuss private health information with wife and will have someone with them after discharge   Patient states that family/friend will be with them for at least 24 hours following today's procedure.   Warm blanket given to pt    Cane under stretcher

## (undated) DEVICE — CATH IV AUTOGRD BC PNK 20GA 25 -- INSYTE

## (undated) DEVICE — GLOVE SURG SZ 65 THK91MIL LTX FREE SYN POLYISOPRENE

## (undated) DEVICE — SYRINGE MED 10ML LUERLOCK TIP W/O SFTY DISP

## (undated) DEVICE — YANKAUER,TAPERED BULBOUS TIP,W/O VENT: Brand: MEDLINE

## (undated) DEVICE — SOLUTION IRRIG 500ML STRL H2O NONPYROGENIC

## (undated) DEVICE — TOWEL 4 PLY TISS 19X30 SUE WHT

## (undated) DEVICE — BASIN EMSIS 16OZ GRAPHITE PLAS KID SHP MOLD GRAD FOR ORAL

## (undated) DEVICE — Device

## (undated) DEVICE — BLOCK BITE ENDOSCP AD 21 MM W/ DIL BLU LF DISP

## (undated) DEVICE — AGENT VISCOELASTIC SODIUM HYALURONATE 10 MG/ML PROVISC

## (undated) DEVICE — PACK CATRCT CUST AS835752] ALCON LABORATORIES INC]

## (undated) DEVICE — ELECTRODE,RADIOTRANSLUCENT,FOAM,5PK: Brand: MEDLINE

## (undated) DEVICE — NEEDLE HYPO 18GA L1.5IN PNK S STL HUB POLYPR SHLD REG BVL

## (undated) DEVICE — SYRINGE MED 30ML STD CLR PLAS LUERLOCK TIP N CTRL DISP

## (undated) DEVICE — SLIT FULL HDL-2.8MM ANG C-CUT: Brand: SHARPOINT

## (undated) DEVICE — MULTIPLE BAND LIGATOR: Brand: SPEEDBAND SUPERVIEW SUPER 7

## (undated) DEVICE — SYR 3ML LL TIP 1/10ML GRAD --

## (undated) DEVICE — NEONATAL-ADULT SPO2 SENSOR: Brand: NELLCOR

## (undated) DEVICE — 1200 GUARD II KIT W/5MM TUBE W/O VAC TUBE: Brand: GUARDIAN

## (undated) DEVICE — Z DISCONTINUED PER MEDLINE LINE GAS SAMPLING O2/CO2 LNG AD 13 FT NSL W/ TBNG FILTERLINE

## (undated) DEVICE — IMPLANTABLE DEVICE: Type: IMPLANTABLE DEVICE | Status: NON-FUNCTIONAL

## (undated) DEVICE — SET ADMIN 16ML TBNG L100IN 2 Y INJ SITE IV PIGGY BK DISP

## (undated) DEVICE — SYR 10ML LUER LOK 1/5ML GRAD --

## (undated) DEVICE — SOLIDIFIER FLD 2OZ 1500CC N DISINF IN BTL DISP SAFESORB

## (undated) DEVICE — NEEDLE HYPO 30GA L0.5IN BGE POLYPR HUB S STL REG BVL STR